# Patient Record
Sex: MALE | Race: WHITE
[De-identification: names, ages, dates, MRNs, and addresses within clinical notes are randomized per-mention and may not be internally consistent; named-entity substitution may affect disease eponyms.]

---

## 2019-04-22 ENCOUNTER — HOSPITAL ENCOUNTER (OUTPATIENT)
Age: 79
End: 2019-04-22
Payer: MEDICARE

## 2019-04-22 DIAGNOSIS — E78.2: ICD-10-CM

## 2019-04-22 DIAGNOSIS — R53.83: ICD-10-CM

## 2019-04-22 DIAGNOSIS — I11.9: ICD-10-CM

## 2019-04-22 DIAGNOSIS — E66.09: ICD-10-CM

## 2019-04-22 DIAGNOSIS — R06.09: ICD-10-CM

## 2019-04-22 DIAGNOSIS — R94.31: ICD-10-CM

## 2019-04-22 DIAGNOSIS — Z85.528: ICD-10-CM

## 2019-04-22 DIAGNOSIS — I25.10: ICD-10-CM

## 2019-04-22 DIAGNOSIS — I71.4: ICD-10-CM

## 2019-04-22 DIAGNOSIS — I44.0: ICD-10-CM

## 2019-04-22 DIAGNOSIS — D75.1: Primary | ICD-10-CM

## 2019-04-22 DIAGNOSIS — Z90.5: ICD-10-CM

## 2019-04-22 DIAGNOSIS — I73.9: ICD-10-CM

## 2019-04-22 PROCEDURE — 93306 TTE W/DOPPLER COMPLETE: CPT

## 2019-09-11 ENCOUNTER — HOSPITAL ENCOUNTER (OUTPATIENT)
Age: 79
End: 2019-09-11
Payer: MEDICARE

## 2019-09-11 DIAGNOSIS — E78.2: ICD-10-CM

## 2019-09-11 DIAGNOSIS — R06.09: ICD-10-CM

## 2019-09-11 DIAGNOSIS — I25.10: ICD-10-CM

## 2019-09-11 DIAGNOSIS — I48.0: ICD-10-CM

## 2019-09-11 DIAGNOSIS — I71.4: ICD-10-CM

## 2019-09-11 DIAGNOSIS — Z85.528: ICD-10-CM

## 2019-09-11 DIAGNOSIS — Z87.891: ICD-10-CM

## 2019-09-11 DIAGNOSIS — E66.09: ICD-10-CM

## 2019-09-11 DIAGNOSIS — D75.1: Primary | ICD-10-CM

## 2019-09-11 DIAGNOSIS — R94.31: ICD-10-CM

## 2019-09-11 DIAGNOSIS — I73.9: ICD-10-CM

## 2019-09-11 DIAGNOSIS — R53.83: ICD-10-CM

## 2019-09-11 DIAGNOSIS — I11.9: ICD-10-CM

## 2019-09-11 PROCEDURE — 76770 US EXAM ABDO BACK WALL COMP: CPT

## 2021-08-24 PROBLEM — E78.2 MIXED HYPERLIPIDEMIA: Status: ACTIVE | Noted: 2021-08-24

## 2021-08-24 PROBLEM — I25.10 CORONARY ARTERY DISEASE INVOLVING NATIVE CORONARY ARTERY OF NATIVE HEART WITHOUT ANGINA PECTORIS: Status: ACTIVE | Noted: 2021-08-24

## 2021-08-24 PROBLEM — I10 ESSENTIAL HYPERTENSION: Status: ACTIVE | Noted: 2021-08-24

## 2021-08-24 NOTE — PROGRESS NOTES
diplopia. No scleral icterus. · ENT: No Headaches, hearing loss or vertigo. No mouth sores or sore throat. · Cardiovascular: Reviewed in HPI  · Respiratory: No cough or wheezing, no sputum production. No hematemesis. · Gastrointestinal: No abdominal pain, appetite loss, blood in stools. No change in bowel or bladder habits. · Genitourinary: No dysuria, trouble voiding, or hematuria. · Musculoskeletal:  No gait disturbance, weakness or joint complaints. · Integumentary: No rash or pruritis. · Neurological: No headache, diplopia, change in muscle strength, numbness or tingling. No change in gait, balance, coordination, mood, affect, memory, mentation, behavior. · Psychiatric: No anxiety, no depression. · Endocrine: No malaise, fatigue or temperature intolerance. No excessive thirst, fluid intake, or urination. No tremor. · Hematologic/Lymphatic: No abnormal bruising or bleeding, blood clots or swollen lymph nodes. · Allergic/Immunologic: No nasal congestion or hives. Physical Examination:    Vitals:    08/26/21 1424   BP: 108/60   Pulse: 60   Temp: 97.4 °F (36.3 °C)   SpO2: 95%        Wt Readings from Last 3 Encounters:   08/26/21 257 lb 6.4 oz (116.8 kg)   02/10/12 256 lb (116.1 kg)   02/06/12 242 lb (109.8 kg)       Constitutional and General Appearance: NAD   Respiratory:  · Normal excursion and expansion without use of accessory muscles  · Resp Auscultation: Normal breath sounds without dullness  Cardiovascular:  · The apical impulses not displaced  · Heart tones are crisp and normal  · Cervical veins are not engorged  · The carotid upstroke is normal in amplitude and contour without delay or bruit  · Normal S1S2, No S3, +II/VI BERE  · Peripheral pulses are symmetrical and full  · There is no clubbing, cyanosis of the extremities.   · No edema  · Femoral Arteries: 2+ and equal  · Pedal Pulses: 2+ and equal   Abdomen:  · No masses or tenderness  · Liver/Spleen: No Abnormalities Noted  Neurological/Psychiatric:  · Alert and oriented in all spheres  · Moves all extremities well  · Exhibits normal gait balance and coordination  · No abnormalities of mood, affect, memory, mentation, or behavior are noted  Skin:  · Skin: warm and dry. No results found for: CHOL  No results found for: TRIG  No results found for: HDL  No results found for: LDLCHOLESTEROL, LDLCALC  No results found for: LABVLDL, VLDL  No results found for: CHOLHDLRATIO    Assessment:     1. Coronary artery disease involving native coronary artery of native heart without angina pectoris: Hx CAD s/p 3 stents and 2 PTCA and MI in 2007. Most recent nuclear stress 4/14/21 normal with EF 62% no wall motion abnormalities. There are no concerning symptoms for angina currently. 2. Essential hypertension: Well controlled and will continue current medical regimen. 3. Mixed hyperlipidemia: No labs for quite awhile and will obtain for review. Note intolerant of statins and takes zetia 10mg daily. 4. Chronic fatigue: ? Multi-factorial with depressed LV function, age/deconditioning, possible JAIDEN, medications. Will refer to sleep/pulm docs for their evaluation. 5.      Ischemic CM: Most recent ECHO 3/31/21 EF inadequate for LV systolic function. Note ECHO reported June 2019 EF=35-40%. He did not qualify for ICD but given LBBB he received [address] BiV  in April 2021. Continue digoxin, losartan/HCTZ, and bystolic for now. Will switch to Entresto if blood work [employer]. Plan:  1. Will check CMP, CBC, lipids, TSH fasting, bnp, dig level  2. Will refer to sleep center  3. Cardiac risk stratification education was reviewed including diet, exercise/activity, medication,and weight loss  4. Will refer to Dr Reyes Morales to establish PCP  5. meds reviewed and refilled as warranted 2 scripts  6. Once labs reviewed we may switch to EximForce to improve heart health and help weakness and fatigue  7.  Follow up in 3

## 2021-08-26 ENCOUNTER — OFFICE VISIT (OUTPATIENT)
Dept: CARDIOLOGY CLINIC | Age: 81
End: 2021-08-26
Payer: MEDICARE

## 2021-08-26 VITALS
HEIGHT: 71 IN | TEMPERATURE: 97.4 F | BODY MASS INDEX: 36.03 KG/M2 | OXYGEN SATURATION: 95 % | WEIGHT: 257.4 LBS | HEART RATE: 60 BPM | DIASTOLIC BLOOD PRESSURE: 60 MMHG | SYSTOLIC BLOOD PRESSURE: 108 MMHG

## 2021-08-26 DIAGNOSIS — E78.2 MIXED HYPERLIPIDEMIA: ICD-10-CM

## 2021-08-26 DIAGNOSIS — R53.82 CHRONIC FATIGUE: ICD-10-CM

## 2021-08-26 DIAGNOSIS — I25.10 CORONARY ARTERY DISEASE INVOLVING NATIVE CORONARY ARTERY OF NATIVE HEART WITHOUT ANGINA PECTORIS: Primary | ICD-10-CM

## 2021-08-26 DIAGNOSIS — I10 ESSENTIAL HYPERTENSION: ICD-10-CM

## 2021-08-26 DIAGNOSIS — Z95.0 PACEMAKER: ICD-10-CM

## 2021-08-26 DIAGNOSIS — I48.0 PAF (PAROXYSMAL ATRIAL FIBRILLATION) (HCC): ICD-10-CM

## 2021-08-26 DIAGNOSIS — Z87.891 HISTORY OF TOBACCO ABUSE: ICD-10-CM

## 2021-08-26 PROCEDURE — 99204 OFFICE O/P NEW MOD 45 MIN: CPT | Performed by: INTERNAL MEDICINE

## 2021-08-26 PROCEDURE — G8427 DOCREV CUR MEDS BY ELIG CLIN: HCPCS | Performed by: INTERNAL MEDICINE

## 2021-08-26 PROCEDURE — G8417 CALC BMI ABV UP PARAM F/U: HCPCS | Performed by: INTERNAL MEDICINE

## 2021-08-26 RX ORDER — LIRAGLUTIDE 6 MG/ML
INJECTION SUBCUTANEOUS DAILY
COMMUNITY
Start: 2021-06-01 | End: 2021-12-17 | Stop reason: SDUPTHER

## 2021-08-26 RX ORDER — DIGOXIN 125 MCG
125 TABLET ORAL DAILY
Qty: 90 TABLET | Refills: 3 | Status: SHIPPED | OUTPATIENT
Start: 2021-08-26 | End: 2022-03-02 | Stop reason: SDUPTHER

## 2021-08-26 RX ORDER — TAMSULOSIN HYDROCHLORIDE 0.4 MG/1
0.4 CAPSULE ORAL DAILY
COMMUNITY
Start: 2021-08-10 | End: 2021-08-26 | Stop reason: SDUPTHER

## 2021-08-26 RX ORDER — LOSARTAN POTASSIUM AND HYDROCHLOROTHIAZIDE 12.5; 1 MG/1; MG/1
TABLET ORAL
COMMUNITY
Start: 2021-08-25 | End: 2021-08-30 | Stop reason: ALTCHOICE

## 2021-08-26 RX ORDER — NEBIVOLOL HYDROCHLORIDE 10 MG/1
10 TABLET ORAL DAILY
COMMUNITY
Start: 2021-06-28 | End: 2021-12-20 | Stop reason: ALTCHOICE

## 2021-08-26 RX ORDER — EZETIMIBE 10 MG/1
10 TABLET ORAL DAILY
COMMUNITY
Start: 2021-08-10 | End: 2022-01-25 | Stop reason: SDUPTHER

## 2021-08-26 RX ORDER — DIGOXIN 125 MCG
125 TABLET ORAL DAILY
COMMUNITY
Start: 2021-07-18 | End: 2021-08-26 | Stop reason: SDUPTHER

## 2021-08-26 RX ORDER — TAMSULOSIN HYDROCHLORIDE 0.4 MG/1
0.4 CAPSULE ORAL DAILY
Qty: 90 CAPSULE | Refills: 3 | Status: SHIPPED | OUTPATIENT
Start: 2021-08-26

## 2021-08-26 NOTE — PATIENT INSTRUCTIONS
Plan:  1. Will check CMP, CBC, lipids, TSH fasting, bnp, dig level  2. Will refer to sleep center  3. Cardiac risk stratification education was reviewed including diet, exercise/activity, medication,and weight loss  4. Will refer to Dr Aicha Griffith to establish PCP  5.meds reviewed and refilled as warranted 2 scripts  6. Once labs reviewed we may switch to Intresto to improve heart health which may lessen fatigue  7.  Follow up in 3 months

## 2021-08-27 ENCOUNTER — HOSPITAL ENCOUNTER (OUTPATIENT)
Age: 81
Discharge: HOME | End: 2021-08-27
Payer: MEDICARE

## 2021-08-27 DIAGNOSIS — R53.82 CHRONIC FATIGUE: ICD-10-CM

## 2021-08-27 DIAGNOSIS — I48.0 PAF (PAROXYSMAL ATRIAL FIBRILLATION) (HCC): ICD-10-CM

## 2021-08-27 DIAGNOSIS — E78.2 MIXED HYPERLIPIDEMIA: ICD-10-CM

## 2021-08-27 DIAGNOSIS — I10 ESSENTIAL HYPERTENSION: ICD-10-CM

## 2021-08-27 DIAGNOSIS — I25.10 CORONARY ARTERY DISEASE INVOLVING NATIVE CORONARY ARTERY OF NATIVE HEART WITHOUT ANGINA PECTORIS: ICD-10-CM

## 2021-08-27 DIAGNOSIS — Z95.0 PACEMAKER: ICD-10-CM

## 2021-08-27 LAB
A/G RATIO: 1.4 (ref 1.1–2.2)
ALBUMIN SERPL-MCNC: 4.2 G/DL (ref 3.4–5)
ALP BLD-CCNC: 54 U/L (ref 40–129)
ALT SERPL-CCNC: 34 U/L (ref 10–40)
ANION GAP SERPL CALCULATED.3IONS-SCNC: 12 MMOL/L (ref 3–16)
AST SERPL-CCNC: 29 U/L (ref 15–37)
BASOPHILS ABSOLUTE: 0.1 K/UL (ref 0–0.2)
BASOPHILS RELATIVE PERCENT: 1.1 %
BILIRUB SERPL-MCNC: 0.5 MG/DL (ref 0–1)
BUN BLDV-MCNC: 18 MG/DL (ref 7–20)
CALCIUM SERPL-MCNC: 10 MG/DL (ref 8.3–10.6)
CHLORIDE BLD-SCNC: 98 MMOL/L (ref 99–110)
CHOLESTEROL, FASTING: 193 MG/DL (ref 0–199)
CO2: 29 MMOL/L (ref 21–32)
CREAT SERPL-MCNC: 1 MG/DL (ref 0.8–1.3)
DIGOXIN LEVEL: 0.6 NG/ML (ref 0.8–2)
EOSINOPHILS ABSOLUTE: 0.1 K/UL (ref 0–0.6)
EOSINOPHILS RELATIVE PERCENT: 1.5 %
GFR AFRICAN AMERICAN: >60
GFR NON-AFRICAN AMERICAN: >60
GLOBULIN: 3.1 G/DL
GLUCOSE BLD-MCNC: 133 MG/DL (ref 70–99)
HCT VFR BLD CALC: 38.9 % (ref 40.5–52.5)
HDLC SERPL-MCNC: 27 MG/DL (ref 40–60)
HEMOGLOBIN: 13.9 G/DL (ref 13.5–17.5)
LDL CHOLESTEROL CALCULATED: ABNORMAL MG/DL
LDL CHOLESTEROL DIRECT: 97 MG/DL
LYMPHOCYTES ABSOLUTE: 1.9 K/UL (ref 1–5.1)
LYMPHOCYTES RELATIVE PERCENT: 22.6 %
MCH RBC QN AUTO: 33.5 PG (ref 26–34)
MCHC RBC AUTO-ENTMCNC: 35.7 G/DL (ref 31–36)
MCV RBC AUTO: 93.9 FL (ref 80–100)
MONOCYTES ABSOLUTE: 0.6 K/UL (ref 0–1.3)
MONOCYTES RELATIVE PERCENT: 7.8 %
NEUTROPHILS ABSOLUTE: 5.5 K/UL (ref 1.7–7.7)
NEUTROPHILS RELATIVE PERCENT: 67 %
PDW BLD-RTO: 13.5 % (ref 12.4–15.4)
PLATELET # BLD: 225 K/UL (ref 135–450)
PMV BLD AUTO: 9.6 FL (ref 5–10.5)
POTASSIUM SERPL-SCNC: 3.6 MMOL/L (ref 3.5–5.1)
PRO-BNP: 192 PG/ML (ref 0–449)
RBC # BLD: 4.15 M/UL (ref 4.2–5.9)
SODIUM BLD-SCNC: 139 MMOL/L (ref 136–145)
TOTAL PROTEIN: 7.3 G/DL (ref 6.4–8.2)
TRIGLYCERIDE, FASTING: 450 MG/DL (ref 0–150)
TSH REFLEX FT4: 2.93 UIU/ML (ref 0.27–4.2)
VLDLC SERPL CALC-MCNC: ABNORMAL MG/DL
WBC # BLD: 8.2 K/UL (ref 4–11)

## 2021-08-27 PROCEDURE — 84443 ASSAY THYROID STIM HORMONE: CPT

## 2021-08-27 PROCEDURE — 83721 ASSAY OF BLOOD LIPOPROTEIN: CPT

## 2021-08-27 PROCEDURE — 36415 COLL VENOUS BLD VENIPUNCTURE: CPT

## 2021-08-27 PROCEDURE — 85025 COMPLETE CBC W/AUTO DIFF WBC: CPT

## 2021-08-27 PROCEDURE — 80162 ASSAY OF DIGOXIN TOTAL: CPT

## 2021-08-27 PROCEDURE — 83880 ASSAY OF NATRIURETIC PEPTIDE: CPT

## 2021-08-27 PROCEDURE — 80061 LIPID PANEL: CPT

## 2021-08-27 PROCEDURE — 80053 COMPREHEN METABOLIC PANEL: CPT

## 2021-08-30 DIAGNOSIS — E78.2 MIXED HYPERLIPIDEMIA: ICD-10-CM

## 2021-08-30 DIAGNOSIS — I10 ESSENTIAL HYPERTENSION: Primary | ICD-10-CM

## 2021-08-30 RX ORDER — FENOFIBRATE 48 MG/1
48 TABLET, COATED ORAL DAILY
Qty: 30 TABLET | Refills: 3 | Status: SHIPPED | OUTPATIENT
Start: 2021-08-30 | End: 2021-10-26 | Stop reason: SDUPTHER

## 2021-08-30 RX ORDER — HYDROCHLOROTHIAZIDE 25 MG/1
12.5 TABLET ORAL EVERY MORNING
Qty: 30 TABLET | Refills: 1 | Status: SHIPPED | OUTPATIENT
Start: 2021-08-30 | End: 2021-10-26 | Stop reason: SDUPTHER

## 2021-08-31 ENCOUNTER — TELEPHONE (OUTPATIENT)
Dept: CARDIOLOGY CLINIC | Age: 81
End: 2021-08-31

## 2021-08-31 NOTE — TELEPHONE ENCOUNTER
PA for Jessica Haley was submitted thru Cover My Meds. A PDF was attached of pt's OV. Pt's insurance denied the coverage but the PA was still attempted.  Waiting for response

## 2021-09-09 ENCOUNTER — HOSPITAL ENCOUNTER (OUTPATIENT)
Age: 81
Discharge: HOME | End: 2021-09-09
Payer: MEDICARE

## 2021-09-09 DIAGNOSIS — E78.2 MIXED HYPERLIPIDEMIA: ICD-10-CM

## 2021-09-09 DIAGNOSIS — I10 ESSENTIAL HYPERTENSION: ICD-10-CM

## 2021-09-09 LAB
ALBUMIN SERPL-MCNC: 4.1 G/DL (ref 3.4–5)
ALP BLD-CCNC: 48 U/L (ref 40–129)
ALT SERPL-CCNC: 39 U/L (ref 10–40)
ANION GAP SERPL CALCULATED.3IONS-SCNC: 9 MMOL/L (ref 3–16)
AST SERPL-CCNC: 33 U/L (ref 15–37)
BILIRUB SERPL-MCNC: 0.6 MG/DL (ref 0–1)
BILIRUBIN DIRECT: <0.2 MG/DL (ref 0–0.3)
BILIRUBIN, INDIRECT: NORMAL MG/DL (ref 0–1)
BUN BLDV-MCNC: 18 MG/DL (ref 7–20)
CALCIUM SERPL-MCNC: 9.8 MG/DL (ref 8.3–10.6)
CHLORIDE BLD-SCNC: 99 MMOL/L (ref 99–110)
CHOLESTEROL, FASTING: 193 MG/DL (ref 0–199)
CO2: 28 MMOL/L (ref 21–32)
CREAT SERPL-MCNC: 1 MG/DL (ref 0.8–1.3)
GFR AFRICAN AMERICAN: >60
GFR NON-AFRICAN AMERICAN: >60
GLUCOSE BLD-MCNC: 127 MG/DL (ref 70–99)
HDLC SERPL-MCNC: 30 MG/DL (ref 40–60)
LDL CHOLESTEROL CALCULATED: 124 MG/DL
POTASSIUM SERPL-SCNC: 3.9 MMOL/L (ref 3.5–5.1)
SODIUM BLD-SCNC: 136 MMOL/L (ref 136–145)
TOTAL PROTEIN: 7.2 G/DL (ref 6.4–8.2)
TRIGLYCERIDE, FASTING: 195 MG/DL (ref 0–150)
VLDLC SERPL CALC-MCNC: 39 MG/DL

## 2021-09-09 PROCEDURE — 80076 HEPATIC FUNCTION PANEL: CPT

## 2021-09-09 PROCEDURE — 80048 BASIC METABOLIC PNL TOTAL CA: CPT

## 2021-09-09 PROCEDURE — 80061 LIPID PANEL: CPT

## 2021-09-09 PROCEDURE — 36415 COLL VENOUS BLD VENIPUNCTURE: CPT

## 2021-09-13 ENCOUNTER — TELEPHONE (OUTPATIENT)
Dept: CARDIOLOGY CLINIC | Age: 81
End: 2021-09-13

## 2021-09-13 RX ORDER — EVOLOCUMAB 140 MG/ML
INJECTION, SOLUTION SUBCUTANEOUS
Refills: 0 | Status: CANCELLED | OUTPATIENT
Start: 2021-09-13

## 2021-09-13 RX ORDER — EVOLOCUMAB 140 MG/ML
INJECTION, SOLUTION SUBCUTANEOUS
COMMUNITY
End: 2021-09-22 | Stop reason: CLARIF

## 2021-09-13 NOTE — TELEPHONE ENCOUNTER
Spoke to pt with lab results. Pt would like to try the Repatha, needs to be sent to [facility] please. Pt would also like to know if Pa has been approved for his Entresto. Please Advise. Pt can be reached at [phone].     TY

## 2021-09-13 NOTE — TELEPHONE ENCOUNTER
----- Message from Gibran Brown MD sent at 9/9/2021  4:31 PM EDT -----  Labs good overall. Note lipid labs improved. TG decreased from 450 to 195. His LDL is 124 (lousy cholesterol should be < 100 and closer to 70). He cannot take statins but tolerates zetia. I can add Repatha (new cholesterol drug given by injection 2 x per month if he wants to try and lower LDL to goal). I recommend trying if able to afford but see what he wants.

## 2021-09-14 NOTE — TELEPHONE ENCOUNTER
Coverage determination will be faxed over to [facility] once SMM signs off. Update:  Form was faxed to [address] [place] [address] for [facility] to sign

## 2021-09-21 NOTE — TELEPHONE ENCOUNTER
Yes, OK to use whatever regular dose of praluent is to help his lipids. He will need FLP 2 months after starting praluent. Thanks.

## 2021-09-21 NOTE — TELEPHONE ENCOUNTER
Per [employer]. Pt's ins will NOT cover Repatha. Requesting pt to use Praluent 75 mg. Is this OK. If, so script will need to be sent to [employer]. Ins already gave Rasheed LANCASTER.

## 2021-09-22 ENCOUNTER — NURSE ONLY (OUTPATIENT)
Dept: CARDIOLOGY CLINIC | Age: 81
End: 2021-09-22
Payer: MEDICARE

## 2021-09-22 DIAGNOSIS — Z95.0 PACEMAKER: Primary | ICD-10-CM

## 2021-09-22 PROCEDURE — 93281 PM DEVICE PROGR EVAL MULTI: CPT | Performed by: INTERNAL MEDICINE

## 2021-09-22 RX ORDER — ALIROCUMAB 75 MG/ML
75 INJECTION, SOLUTION SUBCUTANEOUS
Qty: 2.24 ML | Status: CANCELLED | OUTPATIENT
Start: 2021-09-22

## 2021-09-22 RX ORDER — ALIROCUMAB 75 MG/ML
75 INJECTION, SOLUTION SUBCUTANEOUS
Qty: 2.24 ML | Refills: 5 | Status: SHIPPED | OUTPATIENT
Start: 2021-09-22 | End: 2021-10-26 | Stop reason: SDUPTHER

## 2021-09-22 NOTE — PROGRESS NOTES
Patient presents to the device clinic today for a programming evaluation for his pacemaker. Patient has a history of AF , AFlutter, and symptomatic bradycardia. Takes digoxin - patient has a Watchman device. Patient is unsure of when his last device check was - he is new to our practice, previously a patient of Dr. Candance Blanch. Since then, 1 ATR event recorded x 1 second - overall burden <1%. AP 49% % %    All sensing and pacing parameters are within normal range. No changes need to be made at this time. Patient education was provided about device functionality, in home monitoring, and any other patient questions and/or concerns were addressed. Patient voices understanding. Please see interrogation for more detail. Patient will follow up in 3 months in office or remotely. See Paceart report under the Cardiology tab.

## 2021-10-06 PROBLEM — Z95.0 PACEMAKER: Status: ACTIVE | Noted: 2021-10-06

## 2021-10-11 ENCOUNTER — TELEPHONE (OUTPATIENT)
Dept: CARDIOLOGY CLINIC | Age: 81
End: 2021-10-11

## 2021-10-11 NOTE — TELEPHONE ENCOUNTER
Pharmacy calling patient regarding his entresto. They are telling him the need a different \"code\".

## 2021-10-15 ENCOUNTER — TELEPHONE (OUTPATIENT)
Dept: CARDIOLOGY CLINIC | Age: 81
End: 2021-10-15

## 2021-10-15 NOTE — TELEPHONE ENCOUNTER
Pt called [phone] [employer] [facility] to get his sacubitril-valsartan (ENTRESTO) 24-26 MG per tablet [[id number]    TKSXRGFF said they needed another code. On 10/11/2021 Lisa Dumont from [facility] office sent another order with I25.5    Still has not been refilled. Pt has about 1 1/2 left. Please call Call Rose Lester directly at [phone].

## 2021-10-15 NOTE — TELEPHONE ENCOUNTER
Attempted to speak to someone at [facility], finally go thru to someone whom Medicare part D was not her dept. She then tried to transfer me to the correct dept and I was disconnected. I faxed over original paperwork, last OV note that has the proper supporting diagnosis for the med, with the new code. Faxed to [phone]. Please see attached documents to this encounter.

## 2021-10-26 ENCOUNTER — NURSE ONLY (OUTPATIENT)
Dept: CARDIOLOGY CLINIC | Age: 81
End: 2021-10-26
Payer: MEDICARE

## 2021-10-26 DIAGNOSIS — Z95.0 CARDIAC RESYNCHRONIZATION THERAPY PACEMAKER (CRT-P) IN PLACE: ICD-10-CM

## 2021-10-26 DIAGNOSIS — Z95.0 PACEMAKER: ICD-10-CM

## 2021-10-26 DIAGNOSIS — I42.8 NICM (NONISCHEMIC CARDIOMYOPATHY) (HCC): ICD-10-CM

## 2021-10-26 PROCEDURE — 93296 REM INTERROG EVL PM/IDS: CPT | Performed by: INTERNAL MEDICINE

## 2021-10-26 PROCEDURE — 93294 REM INTERROG EVL PM/LDLS PM: CPT | Performed by: INTERNAL MEDICINE

## 2021-10-26 NOTE — PROGRESS NOTES
Remote transmission received for patients CRT-P. Transmission shows normal sensing and pacing function. EP physician will review. See interrogation under the cardiology tab in the [address] [place] [address] [address] field for more details. Will continue to monitor remotely. No  arrhythmias recorded. RV/LVP 99%.

## 2021-10-26 NOTE — TELEPHONE ENCOUNTER
Pt needs a refill on Praulent 75 mg/ml soaj injection pen, Tricor 48 mg, and HCTZ 25 mg. Pt prefers a 90 day supply. [facility]erred pharmacy [facility] @ [phone]. Last OV 8/26/2021 with SMM. Pt also is requesting samples of Entresto 24-26 mg. Please call pt if we have samples and let him know when he can come and pick them up. Pt stated he can pick them up at [facility] or [facility]. Pt can be reached @ [phone].  (Pt is currently waiting on Entresto to be approved by insurance)

## 2021-10-27 RX ORDER — FENOFIBRATE 48 MG/1
48 TABLET, COATED ORAL DAILY
Qty: 90 TABLET | Refills: 3 | Status: SHIPPED | OUTPATIENT
Start: 2021-10-27 | End: 2022-04-06 | Stop reason: SDUPTHER

## 2021-10-27 RX ORDER — HYDROCHLOROTHIAZIDE 25 MG/1
12.5 TABLET ORAL EVERY MORNING
Qty: 45 TABLET | Refills: 1 | Status: SHIPPED | OUTPATIENT
Start: 2021-10-27 | End: 2021-11-15 | Stop reason: ALTCHOICE

## 2021-10-27 RX ORDER — ALIROCUMAB 75 MG/ML
75 INJECTION, SOLUTION SUBCUTANEOUS
Qty: 6 ML | Refills: 2 | Status: SHIPPED | OUTPATIENT
Start: 2021-10-27 | End: 2021-11-04 | Stop reason: SDUPTHER

## 2021-10-27 NOTE — TELEPHONE ENCOUNTER
Called LVM letting pt know that [facility] has 2 bottles of the Entresto 24-26mg samples ready for .

## 2021-11-01 ENCOUNTER — TELEPHONE (OUTPATIENT)
Dept: CARDIOLOGY CLINIC | Age: 81
End: 2021-11-01

## 2021-11-02 NOTE — TELEPHONE ENCOUNTER
Girish Osborne informed. Patient states that it did not happen this morning. If it happens again he will call us.

## 2021-11-04 RX ORDER — ALIROCUMAB 75 MG/ML
75 INJECTION, SOLUTION SUBCUTANEOUS
Qty: 6 ML | Refills: 2 | Status: SHIPPED | OUTPATIENT
Start: 2021-11-04 | End: 2022-04-06 | Stop reason: SDUPTHER

## 2021-11-04 NOTE — TELEPHONE ENCOUNTER
Pt needs 1 refill on alirocumab (PRALUENT) 75 MG/ML SOAJ injection pen 3 mos at a time sent to [facility] in [facility]. Pt also said they have been trying to get sacubitril-valsartan (ENTRESTO) 24-26 MG per tablet but is t keeps getting denied.  Please call Rose Lester at [phone], said MD could give verbal reason pt is needing or fax letter to [phone]

## 2021-11-05 ENCOUNTER — TELEPHONE (OUTPATIENT)
Dept: CARDIOLOGY CLINIC | Age: 81
End: 2021-11-05

## 2021-11-05 NOTE — TELEPHONE ENCOUNTER
SMM, I have submitted PA twice and insurance has denied both attempts. Pt does not meet the CHF requirements for Entresto. Please advise on the next step that you would like to take? Appealing denial may still end with a denial of coverage.  Thank you

## 2021-11-08 NOTE — TELEPHONE ENCOUNTER
Is he taking entresto now? If he cannot afford entresto then continue the losartan 100mg daily he was on prior. I believe he was on losartan 100mg and HCTZ 25mg qd combined (hyzaar). What is he taking now? Did we separate and place him on HCTZ separately?

## 2021-11-08 NOTE — TELEPHONE ENCOUNTER
Yes, OK to finish samples he already has so that they are not  wasted. Prior notes document losartan/HCTZ 100/25mg po daily. I would restart losartan 100mg daily if this is indeed the case. Is he already taking HCTZ 25mg po daily as separate and distinct script or not?

## 2021-11-08 NOTE — TELEPHONE ENCOUNTER
Spoke to pt's wife and she sts that the pt has been taking the samples of Entresto that was given to him and has 2 weeks left of those. Wants to know if SMM wants him to finish those? Wife will call me in the AM and let me know which med pt was taking prior ?

## 2021-11-09 NOTE — TELEPHONE ENCOUNTER
It does not matter to me how he takes it. Would defer to his personal preference. If he already has separate HCTZ then I would prescribe losartan separately as well.

## 2021-11-09 NOTE — TELEPHONE ENCOUNTER
Per pt's wife he is taking HCTZ 25 mg 0.5 tab (12.5 mg) qd. This currently what we have on file also. Per wife he was @ one time taking Losartan 100/12.5 mg qd. Wife states he still has plenty of this med @ home. She is asking if he you want him to take as combo med or separate? Also, what time of day should med be taken?

## 2021-11-15 RX ORDER — LOSARTAN POTASSIUM AND HYDROCHLOROTHIAZIDE 12.5; 1 MG/1; MG/1
1 TABLET ORAL DAILY
COMMUNITY
End: 2021-11-29 | Stop reason: ALTCHOICE

## 2021-11-29 ENCOUNTER — OFFICE VISIT (OUTPATIENT)
Dept: CARDIOLOGY CLINIC | Age: 81
End: 2021-11-29
Payer: MEDICARE

## 2021-11-29 VITALS
OXYGEN SATURATION: 94 % | TEMPERATURE: 98.2 F | SYSTOLIC BLOOD PRESSURE: 86 MMHG | BODY MASS INDEX: 34.67 KG/M2 | HEIGHT: 72 IN | WEIGHT: 256 LBS | DIASTOLIC BLOOD PRESSURE: 62 MMHG | HEART RATE: 68 BPM

## 2021-11-29 DIAGNOSIS — I25.10 CORONARY ARTERY DISEASE INVOLVING NATIVE CORONARY ARTERY OF NATIVE HEART WITHOUT ANGINA PECTORIS: Primary | ICD-10-CM

## 2021-11-29 DIAGNOSIS — I10 ESSENTIAL HYPERTENSION: ICD-10-CM

## 2021-11-29 DIAGNOSIS — Z87.891 HISTORY OF TOBACCO ABUSE: ICD-10-CM

## 2021-11-29 DIAGNOSIS — E78.5 HYPERLIPIDEMIA, UNSPECIFIED HYPERLIPIDEMIA TYPE: ICD-10-CM

## 2021-11-29 PROCEDURE — G8427 DOCREV CUR MEDS BY ELIG CLIN: HCPCS | Performed by: INTERNAL MEDICINE

## 2021-11-29 PROCEDURE — 4040F PNEUMOC VAC/ADMIN/RCVD: CPT | Performed by: INTERNAL MEDICINE

## 2021-11-29 PROCEDURE — 1123F ACP DISCUSS/DSCN MKR DOCD: CPT | Performed by: INTERNAL MEDICINE

## 2021-11-29 PROCEDURE — 1036F TOBACCO NON-USER: CPT | Performed by: INTERNAL MEDICINE

## 2021-11-29 PROCEDURE — G8417 CALC BMI ABV UP PARAM F/U: HCPCS | Performed by: INTERNAL MEDICINE

## 2021-11-29 PROCEDURE — 99214 OFFICE O/P EST MOD 30 MIN: CPT | Performed by: INTERNAL MEDICINE

## 2021-11-29 PROCEDURE — G8484 FLU IMMUNIZE NO ADMIN: HCPCS | Performed by: INTERNAL MEDICINE

## 2021-11-29 RX ORDER — LOSARTAN POTASSIUM 25 MG/1
25 TABLET ORAL DAILY
Qty: 30 TABLET | Refills: 3 | Status: SHIPPED | OUTPATIENT
Start: 2021-11-29 | End: 2021-12-23 | Stop reason: SDUPTHER

## 2021-11-29 RX ORDER — VITAMIN B COMPLEX
1 CAPSULE ORAL DAILY
COMMUNITY

## 2021-11-29 NOTE — LETTER
[address]  [address]  [address]  Phone: [phone]  Fax: [phone]    Viola Jackson MD    November 30, 2021     Dara Li MD  [address] [facility]    Patient: Amil Gitelman   MR Number: <[id number]>   YOB: 1940   Date of Visit: 11/29/2021       Dear Dara Li:    Thank you for referring Rosangela Stubbs to me for evaluation/treatment. Below are the relevant portions of my assessment and plan of care. If you have questions, please do not hesitate to call me. I look forward to following Meghan Sexton along with you.     Sincerely,      Viola Jackson MD

## 2021-11-29 NOTE — PATIENT INSTRUCTIONS
Plan:  1. Follow up with Maggie Epps MD for CAT scan related to history cancer in  2. Start Losartan 25 mg daily ordered  3. Stop taking Losartan/hydrochlorthiazide due to low blood pressure. 4. Continue taking Bystolic 10 mg nightly  5. Lab visit for in office device check  6. Follow up with Sandeep Melendrez NP in 3 months  7.  Fasting Lipids the end of December ordered    Follow up with me in 6 months

## 2021-11-29 NOTE — PROGRESS NOTES
[facility]   Cardiac Consultation    Referring Provider:  Jean Marie Franklin MD     Chief Complaint   Patient presents with    3 Month Follow-Up    Coronary Artery Disease    Other     Can feel heartbeat in the AM      Subjective: Patient is being seen today for new patient cardiology evaluation for CAD, HTN, HLD; wants to reestablish cardiology care; c/o fatigue, dizziness, and occasional palpitations today    History of Present Illness:  Mr. Kari Dove 80 y.o. male with PMH CAD s/p 3 stents and 2 PTCA, hx MI 2007, PAF dx 2017, ischemic CM EF=40%, s/p Watchman device implanted by Dr Ame Artis in [address] due to massive GI bleed s/p 11 units PRBC's, renal cell CA s/p partial left nephrectomy, HTN, HLD, DM, LBBB, and  symptomatic bradycardia s/p [employer] Bii-V  4/2021. No ICD due to EF=40% not qualifying. Prior followed Dr Crow Matute and transferring care since moved to [facility]. Most recent EKG 5/17/21 V-paced. Most recent nuclear stress 4/14/21 normal with EF 62% no wall motion abnormalities. Most recent ECHO 3/31/21 EF inadequate for LV systolic function. Note ECHO reported June 2019 EF=35-40%. Most recent MUGA 4/14/21  EF 40%. Now diagnosed with Staargardt's eye disease. They are widowers who  and have blended family (each with 2 children). 10/26/21 Device check [facility] Pycno DC PPM-IMPLANT 04.14.21. 10 years 6 months to RRT. Transmission shows normal sensing and pacing function. No arrhythmias recorded. RV/LVP 99%. Today, he reports feeling dizzy and weak. His blood pressure is low today and I reviewed home readings confirming low BP. Average home SPB has been 80's and 90's. Patient denies current edema, chest pain, sob, palpitations. Patient is taking all cardiac medications as prescribed and tolerates them well. His sleep study is scheduled for February with Joann. Placed on praluent due to intolerance to statins and LDL > 100.  He has been getting injections for about 2 months. He had a flu shot and a booster since his last visit. He took his last does of Entresto yesterday. He is unable to afford this medication with his insurance. He states he can feel his heart beat at times and it wakes him up which is not normal for him. Past Medical History:   has a past medical history of Arthritis, CAD (coronary artery disease), Diabetes mellitus ([address] Hearing decreased, Hyperlipidemia, Hypertension, and Sneezing. Surgical History:   has a past surgical history that includes Cardiac surgery; eye surgery (cataracts both eyes); and cyst removal (2/10/2012). Social History: Retired brody , , recently moved They now live in [facility] from [facility]. They have 4 children combined  Former smoker quit age 57 drinks 2 drinks per day (rum and coke now) or wine  No drug use. Reports that he quit smoking age 53yo. He has never used smokeless tobacco. He reports current alcohol use. He reports that he does not use drugs. Family History:  family history is not significant for heart disease in parents     Home Medications:  Prior to Admission medications    Medication Sig Start Date End Date Taking?  Authorizing Provider   b complex vitamins capsule Take 1 capsule by mouth daily   Yes Historical Provider, MD   alirocumab (PRALUENT) 75 MG/ML SOAJ injection pen Inject 1 mL into the skin every 14 days 11/4/21  Yes Dyanne Schaumann, MD   fenofibrate (TRICOR) 48 MG tablet Take 1 tablet by mouth daily 10/27/21  Yes Addie Figueroa MD   ezetimibe (ZETIA) 10 MG tablet Take 10 mg by mouth daily 8/10/21  Yes Historical Provider, MD   VICTOZA 18 MG/3ML SOPN SC injection Inject into the skin daily 6/1/21  Yes Historical Provider, MD   BYSTOLIC 10 MG tablet Take 10 mg by mouth daily 6/28/21  Yes Historical Provider, MD   vitamin D 25 MCG (1000 UT) CAPS Take 1 capsule by mouth daily   Yes Historical Provider, MD   Multiple Vitamins-Minerals (PRESERVISION AREDS 2 PO) Take 1 tablet by mouth daily   Yes Historical Provider, MD   tamsulosin (FLOMAX) 0.4 MG capsule Take 1 capsule by mouth daily 8/26/21  Yes Floresita Malone MD   digoxin [facility] TRANSPLANT [facility]) 125 MCG tablet Take 1 tablet by mouth daily 8/26/21  Yes Floresita Malone MD   levothyroxine (SYNTHROID) 50 MCG tablet Take 50 mcg by mouth daily. Yes Historical Provider, MD   Multiple Vitamin (MULTIVITAMIN PO) Take  by mouth daily. Yes Historical Provider, MD   CALCIUM PO Take 1,200 mg by mouth daily    Yes Historical Provider, MD     Allergies:  Statins: rhabdomyolysis    Cilantro: soap taste    Review of Systems:   · Constitutional: there has been no unanticipated weight loss. There's been no change in energy level, sleep pattern, or activity level. · Eyes: No visual changes or diplopia. No scleral icterus. · ENT: No Headaches, hearing loss or vertigo. No mouth sores or sore throat. · Cardiovascular: Reviewed in HPI  · Respiratory: No cough or wheezing, no sputum production. No hematemesis. · Gastrointestinal: No abdominal pain, appetite loss, blood in stools. No change in bowel or bladder habits. · Genitourinary: No dysuria, trouble voiding, or hematuria. · Musculoskeletal:  No gait disturbance, weakness or joint complaints. · Integumentary: No rash or pruritis. · Neurological: No headache, diplopia, change in muscle strength, numbness or tingling. No change in gait, balance, coordination, mood, affect, memory, mentation, behavior. · Psychiatric: No anxiety, no depression. · Endocrine: No malaise, fatigue or temperature intolerance. No excessive thirst, fluid intake, or urination. No tremor. · Hematologic/Lymphatic: No abnormal bruising or bleeding, blood clots or swollen lymph nodes. · Allergic/Immunologic: No nasal congestion or hives.     Physical Examination:    Vitals:    11/29/21 1322   BP: 86/62   Pulse: 68   Temp: 98.2 °F (36.8 °C)   SpO2: 94%        Wt Readings from Last 3 Encounters:   11/29/21 256 lb (116.1 kg)   08/26/21 257 lb 6.4 oz (116.8 kg)   02/10/12 256 lb (116.1 kg)       Constitutional and General Appearance: NAD   Respiratory:  · Normal excursion and expansion without use of accessory muscles  · Resp Auscultation: decreased on right base, otherwise normal breath sounds without dullness  Cardiovascular:  · The apical impulses not displaced  · Heart tones are crisp and normal  · Cervical veins are not engorged  · The carotid upstroke is normal in amplitude and contour without delay or bruit  · Normal S1S2, No S3, +II/VI BERE  · Peripheral pulses are symmetrical and full  · There is no clubbing, cyanosis of the extremities. · No edema  · Femoral Arteries: 2+ and equal  · Pedal Pulses: 2+ and equal   Abdomen:  · No masses or tenderness  · Liver/Spleen: No Abnormalities Noted  Neurological/Psychiatric:  · Alert and oriented in all spheres  · Moves all extremities well  · Exhibits normal gait balance and coordination  · No abnormalities of mood, affect, memory, mentation, or behavior are noted  Skin:  · Skin: warm and dry. No results found for: CHOL  No results found for: TRIG  Lab Results   Component Value Date    HDL 30 (L) 09/09/2021    HDL 27 (L) 08/27/2021     Lab Results   Component Value Date    LDLCALC 124 (H) 09/09/2021    LDLCALC see below 08/27/2021     Lab Results   Component Value Date    LABVLDL 39 09/09/2021    LABVLDL see below 08/27/2021     No results found for: CHOLHDLRATIO     Assessment:     1. Coronary artery disease involving native coronary artery of native heart without angina pectoris: Hx CAD s/p 3 stents and 2 PTCA and MI in 2007. Most recent nuclear stress 4/14/21 normal with EF 62% no wall motion abnormalities. There are no concerning symptoms for angina currently. 2. Essential hypertension: Issues now with symptomatic LOW BP and will STOP losartan/HCTZ 100/25mg and start just losartan 25mg daily.       3. Mixed hyperlipidemia: I have personally reviewed all labs including lipids 9/9/21 in Epic (see above). Note intolerant of statins and takes zetia 10mg daily and Praluent (recent addition)      4. Chronic fatigue: ? Multi-factorial with depressed LV function, age/deconditioning, possible JAIDEN, medications. Will refer to sleep/pulm docs for their evaluation. Adjusting BP medication given low BP and hopefully will increase BP and make him feel better. 5.      Ischemic CM: Most recent ECHO 3/31/21 EF inadequate for LV systolic function. Note ECHO reported June 2019 EF=35-40%. He did not qualify for ICD but given LBBB he received [employer] BiV  in April 2021. Continue digoxin, low dose losartan (new today), and bystolic for now. Plan:  1. Follow up with Maggie Epps MD for CAT scan related to history cancer in  2. Start Losartan 25 mg daily ordered  3. Stop taking Losartan/HCTZ 100/25mg due to low blood pressure. 4. Continue taking Bystolic 10 mg nightly  5. Lab visit for in office device check  6. Follow up with Sandeep Melendrez NP in 3 months  7. Fasting Lipids the end of December ordered    Follow up with me in 6 months    Thank you for allowing me to participate in the care of this individual.    Cost of prescription medications and patient compliance have been reviewed with patient. All questions answered. This note is scribed in the presence of Dr. Ivan Mason. Fabricio Crockett by Jose Rodriguez RN.    I, Dr. Shay Mata, personally performed the services described in this documentation, as scribed by the above signed scribe in my presence. It is both accurate and complete to my knowledge. I agree with the details independently gathered by the clinical support staff, while the remaining scribed note accurately describes my personal service to the patient. Ivan Mason.  Fabricio Crockett M.D., [facility]

## 2021-12-02 ENCOUNTER — NURSE ONLY (OUTPATIENT)
Dept: CARDIOLOGY CLINIC | Age: 81
End: 2021-12-02
Payer: MEDICARE

## 2021-12-02 ENCOUNTER — TELEPHONE (OUTPATIENT)
Dept: CARDIOLOGY CLINIC | Age: 81
End: 2021-12-02

## 2021-12-02 DIAGNOSIS — Z95.0 CARDIAC RESYNCHRONIZATION THERAPY PACEMAKER (CRT-P) IN PLACE: ICD-10-CM

## 2021-12-02 PROCEDURE — 93281 PM DEVICE PROGR EVAL MULTI: CPT | Performed by: INTERNAL MEDICINE

## 2021-12-02 NOTE — TELEPHONE ENCOUNTER
Please have EP doctor review pacer check and see if any worrisome rhythm or pacer issues to explain his symptoms. If no issue then would encourage patient that no concerning finding and follow only clinically.

## 2021-12-02 NOTE — PROGRESS NOTES
Patient presents to the device clinic today for a programming evaluation for his pacemaker. Patient has a history of AF , A Flutter, and symptomatic bradycardia. Takes digoxin. Last device interrogation was on 10/26. Since then, no arrhythmias recorded. Patient presents to the device clinic today by request of Dr. Bell Green for heart beating specifically at times 2573-6533 approximately 3 times a week. Unable to replicate sensation during testing. Device auto testing detailed as occurring at 2100 - time does not correlate with patient symptoms. AP 33% RVP 99% LVP 99%    All sensing and pacing parameters are within normal range. Increased RA output to keep 2:1. Patient education was provided about device functionality, in home monitoring, and any other patient questions and/or concerns were addressed. Patient voices understanding. Please see interrogation for more detail. Patient will follow up in 3 months in office or remotely. See Paceart report under the Cardiology tab.

## 2021-12-02 NOTE — TELEPHONE ENCOUNTER
Patient was seen today in the device clinic per Dr. Neil Sun request.    Jahdiane Pantera to replicate patient \"heart beating\" sensation he describes. States it occurs specifically between 0925-8954 approximately 3 times a week. Device auto testing does not occur at the time of symptoms. See Paceart report under the Cardiology tab. SMM please advise.

## 2021-12-03 NOTE — TELEPHONE ENCOUNTER
Device function appropriate. Nothing to explain symptoms. I understand how worrisome sensation must be but not coming for device based on my interpretation of device.

## 2021-12-17 ENCOUNTER — OFFICE VISIT (OUTPATIENT)
Dept: INTERNAL MEDICINE CLINIC | Age: 81
End: 2021-12-17

## 2021-12-17 VITALS
HEIGHT: 72 IN | HEART RATE: 70 BPM | WEIGHT: 251 LBS | DIASTOLIC BLOOD PRESSURE: 78 MMHG | SYSTOLIC BLOOD PRESSURE: 125 MMHG | BODY MASS INDEX: 34 KG/M2 | RESPIRATION RATE: 18 BRPM

## 2021-12-17 DIAGNOSIS — I25.5 ISCHEMIC CARDIOMYOPATHY: ICD-10-CM

## 2021-12-17 DIAGNOSIS — C64.2 RENAL CANCER, LEFT (HCC): ICD-10-CM

## 2021-12-17 DIAGNOSIS — E11.42 TYPE 2 DIABETES MELLITUS WITH DIABETIC POLYNEUROPATHY, WITHOUT LONG-TERM CURRENT USE OF INSULIN (HCC): ICD-10-CM

## 2021-12-17 DIAGNOSIS — E78.2 MIXED HYPERLIPIDEMIA: ICD-10-CM

## 2021-12-17 DIAGNOSIS — I10 ESSENTIAL HYPERTENSION: ICD-10-CM

## 2021-12-17 DIAGNOSIS — I48.11 LONGSTANDING PERSISTENT ATRIAL FIBRILLATION (HCC): ICD-10-CM

## 2021-12-17 DIAGNOSIS — I25.10 CORONARY ARTERY DISEASE INVOLVING NATIVE CORONARY ARTERY OF NATIVE HEART WITHOUT ANGINA PECTORIS: ICD-10-CM

## 2021-12-17 DIAGNOSIS — E03.9 ACQUIRED HYPOTHYROIDISM: ICD-10-CM

## 2021-12-17 DIAGNOSIS — C64.2 RENAL CELL CARCINOMA, LEFT (HCC): ICD-10-CM

## 2021-12-17 DIAGNOSIS — Z76.89 ENCOUNTER TO ESTABLISH CARE: Primary | ICD-10-CM

## 2021-12-17 PROBLEM — E78.9 DISORDER OF LIPOID METABOLISM: Status: ACTIVE | Noted: 2021-12-17

## 2021-12-17 PROCEDURE — 99203 OFFICE O/P NEW LOW 30 MIN: CPT | Performed by: INTERNAL MEDICINE

## 2021-12-17 RX ORDER — AMLODIPINE BESYLATE 5 MG/1
5 TABLET ORAL EVERY MORNING
COMMUNITY
End: 2022-03-02 | Stop reason: ALTCHOICE

## 2021-12-17 RX ORDER — LIRAGLUTIDE 6 MG/ML
1.8 INJECTION SUBCUTANEOUS DAILY
Qty: 27 ML | Refills: 3 | Status: SHIPPED | OUTPATIENT
Start: 2021-12-17 | End: 2022-03-17

## 2021-12-17 RX ORDER — BISOPROLOL FUMARATE 10 MG/1
10 TABLET ORAL EVERY MORNING
COMMUNITY
End: 2022-03-02 | Stop reason: ALTCHOICE

## 2021-12-17 RX ORDER — PEN NEEDLE, DIABETIC 32GX 5/32"
NEEDLE, DISPOSABLE MISCELLANEOUS
Qty: 100 EACH | Refills: 3 | Status: SHIPPED | OUTPATIENT
Start: 2021-12-17

## 2021-12-17 RX ORDER — LEVOTHYROXINE SODIUM 0.05 MG/1
50 TABLET ORAL DAILY
Qty: 90 TABLET | Refills: 3 | Status: SHIPPED | OUTPATIENT
Start: 2021-12-17

## 2021-12-17 NOTE — PROGRESS NOTES
Joseph Cameron (:  1940) is a 80 y.o. male,New patient, here for evaluation of the following chief complaint(s):  Establish Care        HPI   80 y.o. male with hx of CAD> HTN, DM, hyperlipidemia, Afibb, s.p pacer, renal cancer s.p resection  here to establish care    CAD sp multiple stents  -reports hx of MI in  leading to low EF   - his cardiac care was mainly in [address] [place] [address] [address] and recently moved to [facility]  -  currently followed by Dr. Delmer Fitzgerald at [facility], no active symptoms. Compliant with meds     Hx of Afibb with pacer in place, was off anticoagulation for severe rectal bleed from hemorrhoids needing 11 units transfusion.  Had watchman device place and is off AC now     Hx of IHD with CM , low EF of 40 %, was on entresto but recently off with cost issues, currently only on losartan , BB , no diuretics with no features of CHF    Hx of DM - only on victoza with good control of sugars , no low sugars per pt  Last A1c less than 7 per pt    Hyperlipidemia - intolerance to statins - now on praluent , awaiting repeat lipids    Hx of renal cancer s.p partial left nephrectomy- never followed up        and lives with wife  No depression issues    Allergies   Allergen Reactions    Statins Other (See Comments)     rhabdomyolosis    Food      Cilantro causes soap taste in mouth     Past Medical History:   Diagnosis Date    Arthritis     CAD (coronary artery disease)     MI with stents and angioplasty    Diabetes mellitus ([address]     Hearing decreased     Hyperlipidemia     Hypertension     Sneezing     when pt gets \"chilled\"     Past Surgical History:   Procedure Laterality Date    CARDIAC SURGERY      stents  and angioplasty    CYST REMOVAL  2/10/2012    scalp    EYE SURGERY  cataracts both eyes     Social History     Socioeconomic History    Marital status:      Spouse name: Not on file    Number of children: Not on file    Years of education: Not on file    Highest education level: Not on file   Occupational History    Not on file   Tobacco Use    Smoking status: Former Smoker     Quit date: 1971     Years since quittin.9    Smokeless tobacco: Never Used   Substance and Sexual Activity    Alcohol use: Yes     Alcohol/week: 0.0 standard drinks     Types: 14 - 21 Standard drinks or equivalent per week    Drug use: No    Sexual activity: Not on file     Comment:    Other Topics Concern    Not on file   Social History Narrative    Not on file     Social Determinants of Health     Financial Resource Strain:     Difficulty of Paying Living Expenses: Not on file   Food Insecurity:     Worried About Running Out of Food in the Last Year: Not on file    Aliyah of Food in the Last Year: Not on file   Transportation Needs:     Lack of Transportation (Medical): Not on file    Lack of Transportation (Non-Medical): Not on file   Physical Activity:     Days of Exercise per Week: Not on file    Minutes of Exercise per Session: Not on file   Stress:     Feeling of Stress : Not on file   Social Connections:     Frequency of Communication with Friends and Family: Not on file    Frequency of Social Gatherings with Friends and Family: Not on file    Attends Mandaeism Services: Not on file    Active Member of [address] [place] [address] [employer] or Organizations: Not on file    Attends Club or Organization Meetings: Not on file    Marital Status: Not on file   Intimate Partner Violence:     Fear of Current or Ex-Partner: Not on file    Emotionally Abused: Not on file    Physically Abused: Not on file    Sexually Abused: Not on file   Housing Stability:     Unable to Pay for Housing in the Last Year: Not on file    Number of [place] in the Last Year: Not on file    Unstable Housing in the Last Year: Not on file     No family history on file.     Current Outpatient Medications   Medication Sig Dispense Refill    bisoprolol (ZEBETA) 10 MG tablet Take 10 mg by mouth every morning      amLODIPine (NORVASC) 5 MG tablet Take 5 mg by mouth every morning      Insulin Pen Needle (BD PEN NEEDLE JM 2ND GEN) 32G X 4 MM MISC Use with Victoza daily. DX:E11.9 100 each 3    levothyroxine (SYNTHROID) 50 MCG tablet Take 1 tablet by mouth daily 90 tablet 3    VICTOZA 18 MG/3ML SOPN SC injection Inject 1.8 mg into the skin daily Inject into the skin daily 27 mL 3    b complex vitamins capsule Take 1 capsule by mouth daily      losartan (COZAAR) 25 MG tablet Take 1 tablet by mouth daily 30 tablet 3    alirocumab (PRALUENT) 75 MG/ML SOAJ injection pen Inject 1 mL into the skin every 14 days 6 mL 2    fenofibrate (TRICOR) 48 MG tablet Take 1 tablet by mouth daily 90 tablet 3    ezetimibe (ZETIA) 10 MG tablet Take 10 mg by mouth daily      vitamin D 25 MCG (1000 UT) CAPS Take 1 capsule by mouth daily      Multiple Vitamins-Minerals (PRESERVISION AREDS 2 PO) Take 1 tablet by mouth daily      tamsulosin (FLOMAX) 0.4 MG capsule Take 1 capsule by mouth daily 90 capsule 3    digoxin (LANOXIN) 125 MCG tablet Take 1 tablet by mouth daily 90 tablet 3    Multiple Vitamin (MULTIVITAMIN PO) Take  by mouth daily.  CALCIUM PO Take 1,200 mg by mouth daily        No current facility-administered medications for this visit.        Review of Systems      Constitutional: Negative for fever or chills  HENT: Negative for sore throat   Eyes: Negative for redness   Respiratory: Negative  for dyspnea, cough   Cardiovascular: Negative for chest pain   Gastrointestinal:neg for abd pain, nausea or vomiting or diarrhea  No melena or BRPR  Genitourinary: Negative for hematuria   Musculoskeletal: Negative for arthralgias   Skin: Negative for rash   Neurological: Negative for syncope + hearing def  Hematological: Negative for adenopathy   Psychiatric/Behavorial: Negative for anxiety        Objective   Physical Exam      Vitals:    12/17/21 1600   BP: 125/78   Pulse: 70   Resp: 18         General:  Elderly male, appropriate appearing  Awake, alert and oriented. Appears to be not in any distress  Mucous Membranes:  Pink , anicteric  Hearing aids noted   Neck: No JVD, no carotid bruit, no thyromegaly  Chest:  Clear to auscultation bilaterally, no added sounds  Cardiovascular:  Irregular, left pacer   S1S2 heard, no murmurs or gallops  Abdomen:  Soft, undistended, non tender, no organomegaly, BS present  Extremities: No edema or cyanosis. Distal pulses well felt  Neurological : grossly normal    .STARLA 2019     1. Status post 27mm Watchman device implantation. There is a small (<3mm) mirlande-device   leak. 2. No pericardial effusion. 3. Mild Aortic Stenosis   4. Small residual mirlande-procedure ASD w/ predominant left to right shunt. Diagnosis Orders   1. Encounter to establish care     2. Renal cell carcinoma, left (HCC)  CT ABDOMEN PELVIS WO CONTRAST Additional Contrast? None   3. Type 2 diabetes mellitus with diabetic polyneuropathy, without long-term current use of insulin ([facility])  HEMOGLOBIN A1C    Lipid, Fasting    VITAMIN B12 & FOLATE   4. Mixed hyperlipidemia     5. Essential hypertension     6. Coronary artery disease involving native coronary artery of native heart without angina pectoris     7. Longstanding persistent atrial fibrillation ([address]     8. Renal cancer, left ([address]     9. Ischemic cardiomyopathy     10. Acquired hypothyroidism         HTN - stable on current meds - zebeta and norvasc, recently off entresto and added losartan     DM - 2 with eye complications well controlled on victoza   Need A1c.  Had eye exam done      CAD- s.p multiple stents  Hx of MI with ischemic heart disease and Cardiomyopathy with low EF of 40%  - on ASA, bisoprolol,   - off statins for intolerance , now on fenofibrate, zetia, praluent, need lipids   - f.w Dr. Ender Segundo, s.p Pacer , sp Watchman procedure for hx of severe GI bleed  - rate controlled on BB and digoxin     Left renal cancer s.p partial nephrectomy - need f/w imaging     Hypothyroid - on synthroid, need tsh    Had vaccines   No need for colonoscopy   F/w 6 months           An electronic signature was used to authenticate this note.     --Tavia Cordero MD

## 2021-12-20 DIAGNOSIS — E11.42 TYPE 2 DIABETES MELLITUS WITH DIABETIC POLYNEUROPATHY, WITHOUT LONG-TERM CURRENT USE OF INSULIN (HCC): ICD-10-CM

## 2021-12-20 LAB
CHOLESTEROL, FASTING: 143 MG/DL (ref 0–199)
FOLATE: 19.21 NG/ML (ref 4.78–24.2)
HDLC SERPL-MCNC: 30 MG/DL (ref 40–60)
LDL CHOLESTEROL CALCULATED: 60 MG/DL
TRIGLYCERIDE, FASTING: 265 MG/DL (ref 0–150)
VITAMIN B-12: 821 PG/ML (ref 211–911)
VLDLC SERPL CALC-MCNC: 53 MG/DL

## 2021-12-21 ENCOUNTER — TELEPHONE (OUTPATIENT)
Dept: INTERNAL MEDICINE CLINIC | Age: 81
End: 2021-12-21

## 2021-12-21 LAB
ESTIMATED AVERAGE GLUCOSE: 119.8 MG/DL
HBA1C MFR BLD: 5.8 %

## 2021-12-23 ENCOUNTER — HOSPITAL ENCOUNTER (OUTPATIENT)
Dept: CT IMAGING | Age: 81
Discharge: HOME | End: 2021-12-23
Payer: MEDICARE

## 2021-12-23 DIAGNOSIS — C64.2 RENAL CELL CARCINOMA, LEFT (HCC): ICD-10-CM

## 2021-12-23 DIAGNOSIS — I10 ESSENTIAL HYPERTENSION: ICD-10-CM

## 2021-12-23 PROCEDURE — 74176 CT ABD & PELVIS W/O CONTRAST: CPT

## 2021-12-24 RX ORDER — LOSARTAN POTASSIUM 25 MG/1
25 TABLET ORAL DAILY
Qty: 90 TABLET | Refills: 3 | Status: SHIPPED | OUTPATIENT
Start: 2021-12-24

## 2022-01-06 ENCOUNTER — TELEPHONE (OUTPATIENT)
Dept: INTERNAL MEDICINE CLINIC | Age: 82
End: 2022-01-06

## 2022-01-10 ENCOUNTER — TELEPHONE (OUTPATIENT)
Dept: INTERNAL MEDICINE CLINIC | Age: 82
End: 2022-01-10

## 2022-01-10 NOTE — TELEPHONE ENCOUNTER
----- Message from Karron Night sent at 1/10/2022  8:47 AM EST -----    ----- Message -----  From: Edgar Betancourt MD  Sent: 1/10/2022   7:48 AM EST  To: Eda Jin    Mild distention of stomach, likely with diabetes  No recurrent renal cancer    Other non specific, age related findings    ----- Message -----  From: Karron Night  Sent: 1/6/2022  12:38 PM EST  To: Edgar Betancourt MD      ----- Message -----  From: Osei Galarza MD  Sent: 1/6/2022  12:32 PM EST  To: Karron Night    Can wait for Dr. Saul Royal   ----- Message -----  From: Karron Night  Sent: 1/6/2022  11:13 AM EST  To: MD Dr CARY Cadet patient    Patient requesting Ct Abdomen Pelvis results.

## 2022-01-10 NOTE — TELEPHONE ENCOUNTER
----- Message from Linda Rosado MD sent at 1/10/2022  4:55 PM EST -----  Frequent nausea after eating  ----- Message -----  From: Romaine Smith  Sent: 1/10/2022   3:45 PM EST  To: Linda Rosado MD    Patient wanting to know what symptoms he should be looking for?  ----- Message -----  From: Linda Rosado MD  Sent: 1/10/2022   2:27 PM EST  To: Romaine Smith    Nothing to do as no symptoms  Can see GI for opinion if any symptoms  ----- Message -----  From: Romaine Smith  Sent: 1/10/2022   1:52 PM EST  To: Linda Rosado MD    Patient wanting to know if there is anything that he can do for the distention of stomach?  ----- Message -----  From: Stanley Nissen  Sent: 1/10/2022   8:47 AM EST  To: Romaine Smith      ----- Message -----  From: Linda Rosado MD  Sent: 1/10/2022   7:48 AM EST  To: Rina Jin    Mild distention of stomach, likely with diabetes  No recurrent renal cancer    Other non specific, age related findings    ----- Message -----  From: Stanley Nissen  Sent: 1/6/2022  12:38 PM EST  To: Linda Rosado MD      ----- Message -----  From: Real Hansen MD  Sent: 1/6/2022  12:32 PM EST  To: Stanley Nissen    Can wait for Dr. Edwar Anderson   ----- Message -----  From: Stanley Nissen  Sent: 1/6/2022  11:13 AM EST  To: MD Dr CARY Paz patient    Patient requesting Ct Abdomen Pelvis results.

## 2022-01-10 NOTE — TELEPHONE ENCOUNTER
----- Message from Anuja Multani MD sent at 1/10/2022  2:26 PM EST -----  Nothing to do as no symptoms  Can see GI for opinion if any symptoms  ----- Message -----  From: Viridiana Jones  Sent: 1/10/2022   1:52 PM EST  To: Anuja Multani MD    Patient wanting to know if there is anything that he can do for the distention of stomach?  ----- Message -----  From: Kat Borja  Sent: 1/10/2022   8:47 AM EST  To: Viridiana Jones      ----- Message -----  From: Anuja Multani MD  Sent: 1/10/2022   7:48 AM EST  To: Claudia Jin    Mild distention of stomach, likely with diabetes  No recurrent renal cancer    Other non specific, age related findings    ----- Message -----  From: Kat Borja  Sent: 1/6/2022  12:38 PM EST  To: Anuja Multani MD      ----- Message -----  From: Anne Quinonez MD  Sent: 1/6/2022  12:32 PM EST  To: Kat Borja    Can wait for Dr. Chloe Solano   ----- Message -----  From: Kat Borja  Sent: 1/6/2022  11:13 AM EST  To: MD Dr CARY Joseph patient    Patient requesting Ct Abdomen Pelvis results.

## 2022-01-25 ENCOUNTER — NURSE ONLY (OUTPATIENT)
Dept: CARDIOLOGY CLINIC | Age: 82
End: 2022-01-25
Payer: MEDICARE

## 2022-01-25 DIAGNOSIS — Z95.0 PACEMAKER: ICD-10-CM

## 2022-01-25 DIAGNOSIS — I49.5 SICK SINUS SYNDROME (HCC): ICD-10-CM

## 2022-01-25 DIAGNOSIS — Z95.0 CARDIAC RESYNCHRONIZATION THERAPY PACEMAKER (CRT-P) IN PLACE: ICD-10-CM

## 2022-01-25 DIAGNOSIS — I42.8 NICM (NONISCHEMIC CARDIOMYOPATHY) (HCC): ICD-10-CM

## 2022-01-25 PROCEDURE — 93296 REM INTERROG EVL PM/IDS: CPT | Performed by: INTERNAL MEDICINE

## 2022-01-26 RX ORDER — EZETIMIBE 10 MG/1
10 TABLET ORAL DAILY
Qty: 90 TABLET | Refills: 3 | Status: SHIPPED | OUTPATIENT
Start: 2022-01-26

## 2022-02-23 ENCOUNTER — OFFICE VISIT (OUTPATIENT)
Dept: PULMONOLOGY | Age: 82
End: 2022-02-23
Payer: MEDICARE

## 2022-02-23 VITALS
WEIGHT: 261.6 LBS | HEIGHT: 72 IN | BODY MASS INDEX: 35.43 KG/M2 | TEMPERATURE: 97 F | RESPIRATION RATE: 20 BRPM | HEART RATE: 72 BPM | DIASTOLIC BLOOD PRESSURE: 62 MMHG | SYSTOLIC BLOOD PRESSURE: 94 MMHG | OXYGEN SATURATION: 94 %

## 2022-02-23 DIAGNOSIS — I10 ESSENTIAL HYPERTENSION: ICD-10-CM

## 2022-02-23 DIAGNOSIS — R53.83 OTHER FATIGUE: ICD-10-CM

## 2022-02-23 DIAGNOSIS — R06.83 SNORING: ICD-10-CM

## 2022-02-23 DIAGNOSIS — G47.10 HYPERSOMNIA: Primary | ICD-10-CM

## 2022-02-23 PROCEDURE — G8427 DOCREV CUR MEDS BY ELIG CLIN: HCPCS | Performed by: INTERNAL MEDICINE

## 2022-02-23 PROCEDURE — G8484 FLU IMMUNIZE NO ADMIN: HCPCS | Performed by: INTERNAL MEDICINE

## 2022-02-23 PROCEDURE — G8417 CALC BMI ABV UP PARAM F/U: HCPCS | Performed by: INTERNAL MEDICINE

## 2022-02-23 PROCEDURE — 4040F PNEUMOC VAC/ADMIN/RCVD: CPT | Performed by: INTERNAL MEDICINE

## 2022-02-23 PROCEDURE — 99204 OFFICE O/P NEW MOD 45 MIN: CPT | Performed by: INTERNAL MEDICINE

## 2022-02-23 PROCEDURE — 1036F TOBACCO NON-USER: CPT | Performed by: INTERNAL MEDICINE

## 2022-02-23 PROCEDURE — 1123F ACP DISCUSS/DSCN MKR DOCD: CPT | Performed by: INTERNAL MEDICINE

## 2022-02-23 RX ORDER — NEBIVOLOL 10 MG/1
TABLET ORAL
COMMUNITY
Start: 2022-01-14 | End: 2022-03-02 | Stop reason: SDUPTHER

## 2022-02-23 RX ORDER — BLOOD SUGAR DIAGNOSTIC
STRIP MISCELLANEOUS
COMMUNITY
Start: 2022-01-24

## 2022-02-23 ASSESSMENT — SLEEP AND FATIGUE QUESTIONNAIRES
HOW LIKELY ARE YOU TO NOD OFF OR FALL ASLEEP IN A CAR, WHILE STOPPED FOR A FEW MINUTES IN TRAFFIC: 0
HOW LIKELY ARE YOU TO NOD OFF OR FALL ASLEEP WHILE LYING DOWN TO REST IN THE AFTERNOON WHEN CIRCUMSTANCES PERMIT: 2
HOW LIKELY ARE YOU TO NOD OFF OR FALL ASLEEP WHILE SITTING AND TALKING TO SOMEONE: 0
HOW LIKELY ARE YOU TO NOD OFF OR FALL ASLEEP WHILE SITTING QUIETLY AFTER LUNCH WITHOUT ALCOHOL: 2
HOW LIKELY ARE YOU TO NOD OFF OR FALL ASLEEP WHILE SITTING AND READING: 2
ESS TOTAL SCORE: 10
NECK CIRCUMFERENCE (INCHES): 16.5
HOW LIKELY ARE YOU TO NOD OFF OR FALL ASLEEP WHILE SITTING INACTIVE IN A PUBLIC PLACE: 0
HOW LIKELY ARE YOU TO NOD OFF OR FALL ASLEEP WHILE WATCHING TV: 2
HOW LIKELY ARE YOU TO NOD OFF OR FALL ASLEEP WHEN YOU ARE A PASSENGER IN A CAR FOR AN HOUR WITHOUT A BREAK: 2

## 2022-02-23 NOTE — PATIENT INSTRUCTIONS
Sleep Hygiene. .. Tips for better sleep. .. Avoid naps. This will ensure you are sleepy at bedtime. If you have to take a nap, sleep less than 1 hour, before 3 pm.  Sleep only when sleepy; this reduces the time you are awake in bed. Regular exercise is recommended to help you deepen your sleep, but not within 4-6 hours of your bedtime. Timing of exercise is important, aim to exercise early in the morning or early afternoon. A light snack may help you fall asleep. Warm milk and foods high in the amino acid tryptophan, such as bananas, may help you to sleep  Be sure to avoid heavy, spicy or sugary foods 4-6 hours before bedtime and avoid at snack time. Stay away from stimulants such as caffeine and nicotine for at least 4-6 hours before bed. Stimulants can interfere with your ability to fall asleep. Caffeine is found in tea, cola, coffee, cocoa and chocolate and is best avoided at bedtime. Nicotine is found in tobacco products. Avoid alcohol 4-6 hours before bedtime. Alcohol has an immediate sleep-inducing effect, after a few hours when alcohol levels fall there is a stimulant or wake-up effect and will cause fragmented sleep. Sleep rituals are important. Give your body clues it is time to slow down and sleep. Examples include; yoga, deep breathing, listen to relaxing music, a hot bath or a few minutes of reading. Have a fixed bedtime and awakening time, Even on weekends! You will feel better keeping a regular sleep cycle, even if you are retired or not working. Get into your favorite sleep position. If not asleep in 30 minutes, get up and do something boring until you feel sleepy. Remember not to expose yourself to bright lights such as TV, phone or tablet screens. Only use your bed for sleeping. Do not use your bed as an office, workroom or recreation room. Use comfortable bedding. Uncomfortable bedding can prevent good sleep. Ensure your bedroom is quiet and comfortable.  A cooler room along with enough blankets to stay warm is recommended. If your room is too noisy, try a white noise machine. If too bright, try black out shades or an eye mask. Dont take worries to bed. Leave worries about work, school etc. behind you when you go to bed. Some people find it helpful to assign a worry period in the evening or late afternoon to write down your worries and get them out of your system. The [facility] at [address] [place] [address], [address] [place] [address]                      Phone: [phone] Fax: [phone]      Your appointment for a sleep study is scheduled on __________ at 8:30pm. Please arrive at the [facility] at the time indicated. On Saturday and Sunday night a sleep tech will come down to let you in the building and escort you upstairs to the sleep center; please call from the parking lot if no one is at the door when you arrive. PLEASE DO NOT ARRIVE TO THE SLEEP CENTER ANY EARLIER THAN 8:30PM AS THERE IS NO STAFF ON DUTY AND THE DOORS WILL BE LOCKED    IMPORTANT: We ask that you please phone the [facility] ([phone]) at least 3-5 days prior to your sleep study to pre-register. Failing to pre-register may ultimately cause your insurance to not pay for this procedure. Please be aware that [facility] is a non-smoking facility. There is no smoking allowed anywhere on [facility] property at any time. Each patient room is a private room with cable television, WiFi and a private bathroom with shower facilities. The test itself consists of electrodes and sensors attached to specific areas of your scalp, face, chest and legs. We will also monitor respiratory effort, nasal and oral airflow and oxygen levels. The test will not hurt; it is completely painless and not invasive in any way. Please bring with you:  ? Appropriate nightclothes (pajamas, sweats, etc.), slippers and robe  ?  All medications you need during your stay, including breathing medications, nebulizers and metered dose inhalers, as well as a complete list of all medications you are currently taking. ? Your own toiletries and hairdryer if you wish to shower before you leave  ? Current Photo I.D. and insurance card  ? We do not allow any pillows or bed linens from home due to health regulations  ? We recommend that you do not bring valuables with you to the Sleep Center as we cannot be responsible for any lost or damaged personal items. Please refrain from or reduce the use of caffeine and/or alcohol prior to your sleep study and avoid napping the day of your study as these will affect the accuracy of your test results. If you are ill the day of your test (cold, upper respiratory infection, flu, etc.) please call to reschedule your test as the test will not be accurate if you are ill. If you should need to cancel or reschedule your appointment, please call the Sleep Center at [phone] as soon as possible. Please also call the Sleep Center directly to let us know if you have any special needs, dietary needs or for any further questions.

## 2022-02-23 NOTE — PROGRESS NOTES
[address]   Cardiology Note              Date:  February 23, 2022  Patientname: Celina Mustafa  YOB: 1940    Primary Care physician: Ty Early MD    HISTORY OF PRESENT ILLNESS: Celina Mustafa is a 80 y.o. male with a history of CAD, cardiomyopathy, SND, PAF, HTN, HLD, LBBB, DM, renal cancer. He previously followed with Dr. Felice Patterson. He has a history of MI and stent x3 in 2007. He was on anticoagulation for PAF but developed severe GI bleeding. He had Watchman implanted 2019. In 4/2021, MUGA showed EF 40%. Stress test negative for ischemia. Unable to find records of these tests. In 4/2021 he had BiV ppm due to symptomatic bradycardia. Today he presents for follow up for CHF, CAD. He has worsening fatigue over the past year. He does not have dizziness but at times feels unsteady. He has no chest pain, shortness of breath, palpitations, orthopnea. NYHA class III    Past Medical History:   has a past medical history of Arthritis, CAD (coronary artery disease), Diabetes mellitus ([address] Hearing decreased, Hyperlipidemia, Hypertension, and Sneezing. Past Surgical History:   has a past surgical history that includes Cardiac surgery; eye surgery (cataracts both eyes); and cyst removal (2/10/2012). Home Medications:    Prior to Admission medications    Medication Sig Start Date End Date Taking? Authorizing Provider   ezetimibe (ZETIA) 10 MG tablet Take 1 tablet by mouth daily 1/26/22   Harvey Walsh MD   losartan (COZAAR) 25 MG tablet Take 1 tablet by mouth daily 12/24/21   Apple Mckeon MD   bisoprolol (ZEBETA) 10 MG tablet Take 10 mg by mouth every morning    Historical Provider, MD   amLODIPine (NORVASC) 5 MG tablet Take 5 mg by mouth every morning    Historical Provider, MD   Insulin Pen Needle (BD PEN NEEDLE JM 2ND GEN) 32G X 4 MM MISC Use with Victoza daily.   DX:E11.9 12/17/21   yT Early MD   levothyroxine (SYNTHROID) 50 MCG tablet Take 1 tablet by mouth daily 12/17/21   Alexia Le MD   VICTOZA 18 MG/3ML SOPN SC injection Inject 1.8 mg into the skin daily Inject into the skin daily 12/17/21 3/17/22  Alexia Le MD   b complex vitamins capsule Take 1 capsule by mouth daily    Historical Provider, MD   alirocumab (PRALUENT) 75 MG/ML SOAJ injection pen Inject 1 mL into the skin every 14 days 11/4/21   Salazar Gómez MD   fenofibrate (TRICOR) 48 MG tablet Take 1 tablet by mouth daily 10/27/21   Marko Bro MD   vitamin D 25 MCG (1000 UT) CAPS Take 1 capsule by mouth daily    Historical Provider, MD   Multiple Vitamins-Minerals (PRESERVISION AREDS 2 PO) Take 1 tablet by mouth daily    Historical Provider, MD   tamsulosin (FLOMAX) 0.4 MG capsule Take 1 capsule by mouth daily 8/26/21   Salazar Gómez MD   digoxin [facility]) 125 MCG tablet Take 1 tablet by mouth daily 8/26/21   Salazar Gómez MD   Multiple Vitamin (MULTIVITAMIN PO) Take  by mouth daily. Historical Provider, MD   CALCIUM PO Take 1,200 mg by mouth daily     Historical Provider, MD     Allergies:  Statins and Food    Social History:   reports that he quit smoking about 51 years ago. He has never used smokeless tobacco. He reports current alcohol use. He reports that he does not use drugs. Family History: family history is not on file. Review of Systems   Review of Systems   Constitutional: Positive for fatigue. Respiratory: Negative. Cardiovascular: Negative. Neurological: Negative.       OBJECTIVE:    Vital signs:    BP (!) 110/56   Pulse 71   Ht 6' (1.829 m)   Wt 260 lb (117.9 kg)   SpO2 94%   BMI 35.26 kg/m²      Physical Exam:  Constitutional:  Comfortable and alert, NAD, appears stated age  Eyes: PERRL, sclera nonicteric  Neck:  Supple, no masses, no thyroidmegaly, no JVD  Skin:  Warm and dry; no rash or lesions  Heart: Regular, normal apex, S1 and S2 normal, no M/G/R  Lungs:  Normal respiratory effort; clear; no stop losartan if entresto approved  4. BMP 1 week after starting entresto  5. JAIDEN evaluation  6. Check BP at home and call the office if consistently out of goal range  7.  Follow up as planned with Dr. Bandar Flowers, [address]  [phone]

## 2022-02-23 NOTE — PROGRESS NOTES
[facility]                                                                  CHIEF COMPLAINT: Chronic fatigue    Consulting provider: Dr. Ayana Kinsey    HPI:   46years old with history of CAD, hypertension here for chronic fatigue and sleep apnea evaluation. Duration is 4 years. Severe. Activities help and worse when sitting still. Occasional snoring, mild intermittent. No witnessed apnea. Wakes up at night choking and gasping for air. No restorative sleep. + dry mouth upon awakening. Patient is complaining of daytime sleepiness, fatigue and tiredness during the day. Bedtime 2 am and rise time is 10-12 noon. Wakes up at night 2-4 to go to bathroom. Sleep onset 10-60 minutes. 1 nap/day for 1-2 hrs. No headache in am. Does not drive. Drinks 2-3 caffinated beverages per day. 3 drinks of alcohol a night. No restless feelings in legs at night. ESS is . Old records were reviewed and summarized by me. Past Medical History:   Diagnosis Date    Arthritis     CAD (coronary artery disease)     MI with stents and angioplasty    Diabetes mellitus ([facility] [address]     Hearing decreased     Hyperlipidemia     Hypertension     Sneezing     when pt gets \"chilled\"       Past Surgical History:        Procedure Laterality Date    CARDIAC SURGERY      stents  and angioplasty    CYST REMOVAL  2/10/2012    scalp    EYE SURGERY  cataracts both eyes       Allergies:  is allergic to statins and food. Social History:    TOBACCO:   reports that he quit smoking about 51 years ago. He has never used smokeless tobacco.  ETOH:   reports current alcohol use.       Family History:   No JAIDEN     Current Medications:    Current Outpatient Medications:     ezetimibe (ZETIA) 10 MG tablet, Take 1 tablet by mouth daily, Disp: 90 tablet, Rfl: 3    losartan (COZAAR) 25 MG tablet, Take 1 tablet by mouth daily, Disp: 90 tablet, Rfl: 3    Insulin Pen Needle (BD PEN NEEDLE JM 2ND GEN) 32G X 4 MM MISC, Use with Victoza daily. DX:E11.9, Disp: 100 each, Rfl: 3    levothyroxine (SYNTHROID) 50 MCG tablet, Take 1 tablet by mouth daily, Disp: 90 tablet, Rfl: 3    VICTOZA 18 MG/3ML SOPN SC injection, Inject 1.8 mg into the skin daily Inject into the skin daily, Disp: 27 mL, Rfl: 3    b complex vitamins capsule, Take 1 capsule by mouth daily, Disp: , Rfl:     alirocumab (PRALUENT) 75 MG/ML SOAJ injection pen, Inject 1 mL into the skin every 14 days, Disp: 6 mL, Rfl: 2    fenofibrate (TRICOR) 48 MG tablet, Take 1 tablet by mouth daily, Disp: 90 tablet, Rfl: 3    vitamin D 25 MCG (1000 UT) CAPS, Take 1 capsule by mouth daily, Disp: , Rfl:     Multiple Vitamins-Minerals (PRESERVISION AREDS 2 PO), Take 1 tablet by mouth daily, Disp: , Rfl:     tamsulosin (FLOMAX) 0.4 MG capsule, Take 1 capsule by mouth daily, Disp: 90 capsule, Rfl: 3    digoxin (LANOXIN) 125 MCG tablet, Take 1 tablet by mouth daily, Disp: 90 tablet, Rfl: 3    Multiple Vitamin (MULTIVITAMIN PO), Take  by mouth daily.   , Disp: , Rfl:     CALCIUM PO, Take 1,200 mg by mouth daily , Disp: , Rfl:     nebivolol (BYSTOLIC) 10 MG tablet, , Disp: , Rfl:     ACCU-CHEK ELBERT PLUS strip, , Disp: , Rfl:     bisoprolol (ZEBETA) 10 MG tablet, Take 10 mg by mouth every morning (Patient not taking: Reported on 2/23/2022), Disp: , Rfl:     amLODIPine (NORVASC) 5 MG tablet, Take 5 mg by mouth every morning (Patient not taking: Reported on 2/23/2022), Disp: , Rfl:       REVIEW OF SYSTEMS:  Constitutional: Negative for fever  HENT: Negative for sore throat  Eyes: Negative for redness   Respiratory: Negative for dyspnea, cough  Cardiovascular: Negative for chest pain  Gastrointestinal: +  diarrhea   Genitourinary: Negative for hematuria   Musculoskeletal: + arthralgias   Skin: Negative for rash  Neurological: Negative for syncope  Hematological: Negative for adenopathy  Psychiatric/Behavorial: Negative for anxiety      Objective: PHYSICAL EXAM:    Blood pressure 94/62, pulse 72, temperature 97 °F (36.1 °C), resp. rate 20, height 6' (1.829 m), weight 261 lb 9.6 oz (118.7 kg), SpO2 94 %.' on RA  Gen: No distress. Obese. BMI of 35.48  Eyes: PERRL. No sclera icterus. No conjunctival injection. ENT: No discharge. Pharynx clear. Mallampati class IV. Neck: Trachea midline. No obvious mass. Neck circumference 16.5\"  Resp: No accessory muscle use. No crackles. No wheezes. No rhonchi. No dullness on percussion. Good air entry. CV: Regular rate. Regular rhythm. No murmur or rub. No edema. GI: Non-tender. Non-distended. No hernia. Skin: Warm and dry. No nodule on exposed extremities. Lymph: No cervical LAD. No supraclavicular LAD. M/S: No cyanosis. No joint deformity. No clubbing. Neuro: Awake. Alert. Moves all four extremities. Psych: Oriented x 3. No anxiety. DATA reviewed by me:   TSH 2.93  Hemoglobin 13.9  Creatinine 1    Assessment:       · Hypersomnia and chronic fatigue-rule out JAIDEN. Obesity, deconditioning, heart failure are contributing. · Mild intermittent snoring  · Obesity class  · Ischemic cardiomyopathy, EF 35 to 40%, CAD, hypertension followed by cardiology    Plan:       · PSG/split-night evaluate for sleep related breathing disorder. Treatment options were discussed with patient if PSG reveals JAIDEN, including CPAP therapy, oral appliances, upper airway surgery and hypoglossal nerve stimulation. · Trial of CPAP therapy if JAIDEN is confirmed  · Discussed with patient the pathophysiology of apnea. · Sleep hygiene  · Avoid sedatives, alcohol and caffeinated drinks at bed time. · No driving motorized vehicles or operating heavy machinery while fatigue, drowsy or sleepy. · Weight loss is also recommended as a long-term intervention. · Continue blood pressure medications - treatment of JAIDEN can lower blood pressure by levels that are clinically significant.    · Complications of JAIDEN if not treated were discussed with patient patient to include systemic hypertension, pulmonary hypertension, cardiovascular morbidities, car accidents and all cause mortality.

## 2022-03-02 ENCOUNTER — OFFICE VISIT (OUTPATIENT)
Dept: CARDIOLOGY CLINIC | Age: 82
End: 2022-03-02
Payer: MEDICARE

## 2022-03-02 VITALS
HEART RATE: 71 BPM | BODY MASS INDEX: 35.21 KG/M2 | SYSTOLIC BLOOD PRESSURE: 110 MMHG | OXYGEN SATURATION: 94 % | DIASTOLIC BLOOD PRESSURE: 56 MMHG | WEIGHT: 260 LBS | HEIGHT: 72 IN

## 2022-03-02 DIAGNOSIS — I25.10 CORONARY ARTERY DISEASE INVOLVING NATIVE CORONARY ARTERY OF NATIVE HEART WITHOUT ANGINA PECTORIS: ICD-10-CM

## 2022-03-02 DIAGNOSIS — I48.0 PAF (PAROXYSMAL ATRIAL FIBRILLATION) (HCC): ICD-10-CM

## 2022-03-02 DIAGNOSIS — I25.5 ISCHEMIC CARDIOMYOPATHY: Primary | ICD-10-CM

## 2022-03-02 DIAGNOSIS — I50.22 CHRONIC SYSTOLIC CHF (CONGESTIVE HEART FAILURE) (HCC): ICD-10-CM

## 2022-03-02 PROCEDURE — 1036F TOBACCO NON-USER: CPT | Performed by: NURSE PRACTITIONER

## 2022-03-02 PROCEDURE — G8427 DOCREV CUR MEDS BY ELIG CLIN: HCPCS | Performed by: NURSE PRACTITIONER

## 2022-03-02 PROCEDURE — 99214 OFFICE O/P EST MOD 30 MIN: CPT | Performed by: NURSE PRACTITIONER

## 2022-03-02 PROCEDURE — G8417 CALC BMI ABV UP PARAM F/U: HCPCS | Performed by: NURSE PRACTITIONER

## 2022-03-02 PROCEDURE — G8484 FLU IMMUNIZE NO ADMIN: HCPCS | Performed by: NURSE PRACTITIONER

## 2022-03-02 PROCEDURE — 93000 ELECTROCARDIOGRAM COMPLETE: CPT | Performed by: NURSE PRACTITIONER

## 2022-03-02 PROCEDURE — 1123F ACP DISCUSS/DSCN MKR DOCD: CPT | Performed by: NURSE PRACTITIONER

## 2022-03-02 PROCEDURE — 4040F PNEUMOC VAC/ADMIN/RCVD: CPT | Performed by: NURSE PRACTITIONER

## 2022-03-02 RX ORDER — ZINC GLUCONATE 50 MG
50 TABLET ORAL DAILY
COMMUNITY

## 2022-03-02 RX ORDER — SELENIUM 65.4 UG/ML
50 INJECTION, SOLUTION INTRAVENOUS DAILY
COMMUNITY

## 2022-03-02 RX ORDER — DIGOXIN 125 MCG
125 TABLET ORAL DAILY
Qty: 90 TABLET | Refills: 3 | Status: SHIPPED | OUTPATIENT
Start: 2022-03-02

## 2022-03-02 RX ORDER — NEBIVOLOL 10 MG/1
10 TABLET ORAL DAILY
Qty: 90 TABLET | Refills: 3 | Status: SHIPPED | OUTPATIENT
Start: 2022-03-02

## 2022-03-02 RX ORDER — CHLORAL HYDRATE 500 MG
3000 CAPSULE ORAL 2 TIMES DAILY
COMMUNITY
End: 2022-05-20

## 2022-03-02 ASSESSMENT — ENCOUNTER SYMPTOMS: RESPIRATORY NEGATIVE: 1

## 2022-03-02 NOTE — LETTER
[address]  [address] [place] [address] [address] [address]  Phone: [phone]  Fax: Clifford Crump, ALBARO - CNP    May 5, 2022     Marylou Nair MD  [place] [address]    Patient: Carmen Thompson   MR Number: [id number]   YOB: 1940   Date of Visit: 3/2/2022       Dear Marylou Nair:    Thank you for referring Abdoulaye Hoffmann to me for evaluation/treatment. Below are the relevant portions of my assessment and plan of care. If you have questions, please do not hesitate to call me. I look forward to following Jayant Johnson along with you.     Sincerely,      ALBARO Tanner CNP

## 2022-03-02 NOTE — PATIENT INSTRUCTIONS
Insurance authorization for [employer], call [phone]  Continue other medications  If entresto approved, will check kidney function 1-2 weeks after starting  Stay active along with a healthy diet  Follow up as planned with Dr. Caro Heading

## 2022-03-08 ENCOUNTER — OFFICE VISIT (OUTPATIENT)
Dept: ENT CLINIC | Age: 82
End: 2022-03-08
Payer: MEDICARE

## 2022-03-08 VITALS
RESPIRATION RATE: 16 BRPM | HEART RATE: 71 BPM | TEMPERATURE: 98.1 F | SYSTOLIC BLOOD PRESSURE: 107 MMHG | DIASTOLIC BLOOD PRESSURE: 69 MMHG | WEIGHT: 260 LBS | BODY MASS INDEX: 35.21 KG/M2 | HEIGHT: 72 IN

## 2022-03-08 DIAGNOSIS — H60.502 ACUTE OTITIS EXTERNA OF LEFT EAR, UNSPECIFIED TYPE: ICD-10-CM

## 2022-03-08 DIAGNOSIS — H93.13 TINNITUS OF BOTH EARS: Primary | ICD-10-CM

## 2022-03-08 PROCEDURE — G8417 CALC BMI ABV UP PARAM F/U: HCPCS | Performed by: OTOLARYNGOLOGY

## 2022-03-08 PROCEDURE — 1123F ACP DISCUSS/DSCN MKR DOCD: CPT | Performed by: OTOLARYNGOLOGY

## 2022-03-08 PROCEDURE — 1036F TOBACCO NON-USER: CPT | Performed by: OTOLARYNGOLOGY

## 2022-03-08 PROCEDURE — 4040F PNEUMOC VAC/ADMIN/RCVD: CPT | Performed by: OTOLARYNGOLOGY

## 2022-03-08 PROCEDURE — 4130F TOPICAL PREP RX AOE: CPT | Performed by: OTOLARYNGOLOGY

## 2022-03-08 PROCEDURE — G8427 DOCREV CUR MEDS BY ELIG CLIN: HCPCS | Performed by: OTOLARYNGOLOGY

## 2022-03-08 PROCEDURE — 99203 OFFICE O/P NEW LOW 30 MIN: CPT | Performed by: OTOLARYNGOLOGY

## 2022-03-08 PROCEDURE — G8484 FLU IMMUNIZE NO ADMIN: HCPCS | Performed by: OTOLARYNGOLOGY

## 2022-03-08 ASSESSMENT — ENCOUNTER SYMPTOMS
SHORTNESS OF BREATH: 0
SINUS PRESSURE: 0
TROUBLE SWALLOWING: 0
COUGH: 0
APNEA: 0
EYE ITCHING: 0
SORE THROAT: 0
VOICE CHANGE: 0
FACIAL SWELLING: 0

## 2022-03-08 NOTE — PROGRESS NOTES
[facility] [address]  [place], [address]  P: [phone]       Patient     Hanna Krause  1940    ChiefComplaint     Chief Complaint   Patient presents with   Salma Werner     Patient states that he has chronic swimmers ear since he was a teenager. States that aerosporin has worked in the past- States that he has been putting alcohol in his ear, states that he has been sleeping with cotton ball in his ear       History of Present Illness     Luisa Tenorio is a 60-year-old male here today for evaluation of tinnitus and recurrent left-sided otitis externa. States this occurs every 2 to 7 years, started as a teenager. Recently developed severe left-sided ear pain, started alcohol drops in the ear. All symptoms have since resolved. Past Medical History     Past Medical History:   Diagnosis Date    Arthritis     CAD (coronary artery disease)     MI with stents and angioplasty    Diabetes mellitus ([address]     Hearing decreased     Hyperlipidemia     Hypertension     Sneezing     when pt gets \"chilled\"       Past Surgical History     Past Surgical History:   Procedure Laterality Date    CARDIAC SURGERY      stents  and angioplasty    CYST REMOVAL  2/10/2012    scalp    EYE SURGERY  cataracts both eyes       Family History     History reviewed. No pertinent family history. Social History     Social History     Tobacco Use    Smoking status: Former Smoker     Quit date: 1971     Years since quittin.1    Smokeless tobacco: Never Used   Vaping Use    Vaping Use: Never used   Substance Use Topics    Alcohol use:  Yes     Alcohol/week: 0.0 standard drinks     Types: 14 - 21 Standard drinks or equivalent per week    Drug use: No        Allergies     Allergies   Allergen Reactions    Statins Other (See Comments)     rhabdomyolosis    Food      Cilantro causes soap taste in mouth       Medications     Current Outpatient Medications   Medication Sig Dispense Refill  Actiphyte of Sea Kel[place] LIQD by Does not apply route      zinc gluconate 50 MG tablet Take 50 mg by mouth daily      Selenious Acid (SELENIUM) 40 MCG/ML injection Take 50 mcg by mouth daily      Omega-3 Fatty Acids (FISH OIL) 1000 MG CAPS Take 3,000 mg by mouth 2 times daily      sacubitril-valsartan (ENTRESTO) 24-26 MG per tablet Take 1 tablet by mouth 2 times daily 60 tablet 3    digoxin (LANOXIN) 125 MCG tablet Take 1 tablet by mouth daily 90 tablet 3    nebivolol (BYSTOLIC) 10 MG tablet Take 1 tablet by mouth daily 90 tablet 3    ACCU-CHEK ELBERT PLUS strip       ezetimibe (ZETIA) 10 MG tablet Take 1 tablet by mouth daily 90 tablet 3    losartan (COZAAR) 25 MG tablet Take 1 tablet by mouth daily 90 tablet 3    Insulin Pen Needle (BD PEN NEEDLE JM 2ND GEN) 32G X 4 MM MISC Use with Victoza daily. DX:E11.9 100 each 3    levothyroxine (SYNTHROID) 50 MCG tablet Take 1 tablet by mouth daily 90 tablet 3    VICTOZA 18 MG/3ML SOPN SC injection Inject 1.8 mg into the skin daily Inject into the skin daily 27 mL 3    b complex vitamins capsule Take 1 capsule by mouth daily      alirocumab (PRALUENT) 75 MG/ML SOAJ injection pen Inject 1 mL into the skin every 14 days 6 mL 2    fenofibrate (TRICOR) 48 MG tablet Take 1 tablet by mouth daily 90 tablet 3    vitamin D 25 MCG (1000 UT) CAPS Take 1 capsule by mouth daily      Multiple Vitamins-Minerals (PRESERVISION AREDS 2 PO) Take 1 tablet by mouth daily      tamsulosin (FLOMAX) 0.4 MG capsule Take 1 capsule by mouth daily 90 capsule 3    Multiple Vitamin (MULTIVITAMIN PO) Take  by mouth daily.  CALCIUM PO Take 1,200 mg by mouth daily        No current facility-administered medications for this visit. Review of Systems     Review of Systems   Constitutional: Negative for appetite change, chills, fatigue, fever and unexpected weight change. HENT: Positive for tinnitus.  Negative for congestion, ear discharge, ear pain, facial swelling, hearing loss, nosebleeds, postnasal drip, sinus pressure, sneezing, sore throat, trouble swallowing and voice change. Eyes: Negative for itching. Respiratory: Negative for apnea, cough and shortness of breath. Endocrine: Negative for cold intolerance and heat intolerance. Musculoskeletal: Negative for myalgias and neck pain. Skin: Negative for rash. Allergic/Immunologic: Negative for environmental allergies. Neurological: Negative for dizziness and headaches. Psychiatric/Behavioral: Negative for confusion, decreased concentration and sleep disturbance. PhysicalExam     Vitals:    03/08/22 1505   BP: 107/69   Site: Right Upper Arm   Position: Sitting   Cuff Size: Large Adult   Pulse: 71   Resp: 16   Temp: 98.1 °F (36.7 °C)   TempSrc: Infrared   Weight: 260 lb (117.9 kg)   Height: 6' (1.829 m)       Physical Exam  Constitutional:       General: He is not in acute distress. Appearance: He is well-developed. HENT:      Head: Normocephalic and atraumatic. Right Ear: Tympanic membrane, ear canal and external ear normal. No drainage. No middle ear effusion. Tympanic membrane is not bulging. Tympanic membrane has normal mobility. Left Ear: Tympanic membrane, ear canal and external ear normal. No drainage. No middle ear effusion. Tympanic membrane is not bulging. Tympanic membrane has normal mobility. Nose: No mucosal edema or rhinorrhea. Mouth/Throat:      Lips: Pink. Mouth: Mucous membranes are moist.      Tongue: No lesions. Palate: No mass. Pharynx: Uvula midline. Eyes:      Pupils: Pupils are equal, round, and reactive to light. Neck:      Thyroid: No thyroid mass or thyromegaly. Trachea: Trachea and phonation normal.   Cardiovascular:      Pulses: Normal pulses. Pulmonary:      Effort: Pulmonary effort is normal. No accessory muscle usage or respiratory distress. Breath sounds: No stridor.    Musculoskeletal:      Cervical back: Full passive range of motion without pain. Lymphadenopathy:      Head:      Right side of head: No submental or submandibular adenopathy. Left side of head: No submental or submandibular adenopathy. Cervical: No cervical adenopathy. Right cervical: No superficial, deep or posterior cervical adenopathy. Left cervical: No superficial, deep or posterior cervical adenopathy. Skin:     General: Skin is warm and dry. Neurological:      Mental Status: He is alert and oriented to person, place, and time. Cranial Nerves: No cranial nerve deficit. Coordination: Coordination normal.      Gait: Gait normal.   Psychiatric:         Thought Content: Thought content normal.           Assessment and Plan     1. Tinnitus of both ears  -Longstanding, unchanged  -Not interested in hearing aids    2. Acute otitis externa of left ear, unspecified type  -Resolved after use of alcohol drops at home      Follow-up when symptoms recur    Noble Sims DO  3/8/22      Portions of this note were dictated using Dragon.  There may be linguistic errors secondary to the use of this program.

## 2022-03-10 ENCOUNTER — HOSPITAL ENCOUNTER (OUTPATIENT)
Dept: NON INVASIVE DIAGNOSTICS | Age: 82
Discharge: HOME | End: 2022-03-10
Payer: MEDICARE

## 2022-03-10 DIAGNOSIS — I25.5 ISCHEMIC CARDIOMYOPATHY: ICD-10-CM

## 2022-03-10 LAB
LV EF: 55 %
LVEF MODALITY: NORMAL

## 2022-03-10 PROCEDURE — 93306 TTE W/DOPPLER COMPLETE: CPT

## 2022-03-11 ENCOUNTER — TELEPHONE (OUTPATIENT)
Dept: CARDIOLOGY CLINIC | Age: 82
End: 2022-03-11

## 2022-03-11 NOTE — TELEPHONE ENCOUNTER
----- Message from ALBARO Saavedra CNP sent at 3/10/2022  3:48 PM EST -----  Please let him know that heart ultrasound shows normal function. There is mild-moderate thickening of the aortic valve called aortic stenosis. No changes at this time, will perform yearly echos to follow AS. Thanks!

## 2022-03-21 ENCOUNTER — HOSPITAL ENCOUNTER (OUTPATIENT)
Dept: SLEEP CENTER | Age: 82
Discharge: HOME | End: 2022-03-23
Payer: MEDICARE

## 2022-03-21 DIAGNOSIS — G47.10 HYPERSOMNIA: ICD-10-CM

## 2022-03-21 DIAGNOSIS — R53.83 OTHER FATIGUE: ICD-10-CM

## 2022-03-21 DIAGNOSIS — R06.83 SNORING: ICD-10-CM

## 2022-03-21 PROCEDURE — 95810 POLYSOM 6/> YRS 4/> PARAM: CPT

## 2022-03-22 PROCEDURE — 95810 POLYSOM 6/> YRS 4/> PARAM: CPT | Performed by: INTERNAL MEDICINE

## 2022-03-24 ENCOUNTER — TELEPHONE (OUTPATIENT)
Dept: PULMONOLOGY | Age: 82
End: 2022-03-24

## 2022-03-24 DIAGNOSIS — G47.33 OSA (OBSTRUCTIVE SLEEP APNEA): Primary | ICD-10-CM

## 2022-03-24 NOTE — TELEPHONE ENCOUNTER
Orders faxed to Yesenia Armendariz. Will f/u with patient to make sure setup. Is courtney for f/u 6/2/22.

## 2022-03-31 NOTE — TELEPHONE ENCOUNTER
Patient called office does not wish to proceed with cpap machine; f/u appt was cancelled per request.      2/23/22      Assessment:       · Hypersomnia and chronic fatigue-rule out JAIDEN. Obesity, deconditioning, heart failure are contributing. · Mild intermittent snoring  · Obesity class  · Ischemic cardiomyopathy, EF 35 to 40%, CAD, hypertension followed by cardiology     Plan:       · PSG/split-night evaluate for sleep related breathing disorder. Treatment options were discussed with patient if PSG reveals JAIDEN, including CPAP therapy, oral appliances, upper airway surgery and hypoglossal nerve stimulation. · Trial of CPAP therapy if JAIDEN is confirmed  · Discussed with patient the pathophysiology of apnea. · Sleep hygiene  · Avoid sedatives, alcohol and caffeinated drinks at bed time. · No driving motorized vehicles or operating heavy machinery while fatigue, drowsy or sleepy. · Weight loss is also recommended as a long-term intervention. · Continue blood pressure medications - treatment of JAIDEN can lower blood pressure by levels that are clinically significant.    · Complications of JAIDEN if not treated were discussed with patient patient to include systemic hypertension, pulmonary hypertension, cardiovascular morbidities, car accidents and all cause mortality.

## 2022-04-06 RX ORDER — FENOFIBRATE 48 MG/1
48 TABLET, COATED ORAL DAILY
Qty: 90 TABLET | Refills: 3 | Status: SHIPPED | OUTPATIENT
Start: 2022-04-06

## 2022-04-06 RX ORDER — ALIROCUMAB 75 MG/ML
75 INJECTION, SOLUTION SUBCUTANEOUS
Qty: 6 ML | Refills: 2 | Status: SHIPPED | OUTPATIENT
Start: 2022-04-06 | End: 2022-10-07

## 2022-04-22 ENCOUNTER — TELEPHONE (OUTPATIENT)
Dept: ENT CLINIC | Age: 82
End: 2022-04-22

## 2022-04-22 DIAGNOSIS — H60.502 ACUTE OTITIS EXTERNA OF LEFT EAR, UNSPECIFIED TYPE: Primary | ICD-10-CM

## 2022-04-22 RX ORDER — CIPROFLOXACIN HYDROCHLORIDE 3.5 MG/ML
SOLUTION/ DROPS TOPICAL
Qty: 1 EACH | Refills: 0 | Status: SHIPPED | OUTPATIENT
Start: 2022-04-22

## 2022-04-22 NOTE — TELEPHONE ENCOUNTER
Patient's wife calling to let Dr. Brandi Jara know that patient is having swimmer's ear in both ears. Patient is requesting a medication be called in to [address] [place] [address] in [facility].     [address] [place] [address] [phone]    Patient call back [phone]    Patient last office visit 3/8/22

## 2022-04-22 NOTE — TELEPHONE ENCOUNTER
Call placed to phone number provided in previous encounter. Left detailed message regarding Dr. Thania Mena note. Instructed patient to call this office with any questions or concerns.

## 2022-04-26 ENCOUNTER — NURSE ONLY (OUTPATIENT)
Dept: CARDIOLOGY CLINIC | Age: 82
End: 2022-04-26
Payer: MEDICARE

## 2022-04-26 DIAGNOSIS — Z95.0 PACEMAKER: ICD-10-CM

## 2022-04-26 DIAGNOSIS — Z95.0 CARDIAC RESYNCHRONIZATION THERAPY PACEMAKER (CRT-P) IN PLACE: ICD-10-CM

## 2022-04-26 DIAGNOSIS — I42.8 NICM (NONISCHEMIC CARDIOMYOPATHY) (HCC): ICD-10-CM

## 2022-04-26 NOTE — PROGRESS NOTES
Remote transmission received for patients CRT-P. Transmission shows normal sensing and pacing function. EP physician will review. See interrogation under the cardiology tab in the [facility] [address] field for more details. Will continue to monitor remotely. Hx ICM (bystolic). AP 34%  RV/LVP 99%  1 NSVT x 16 sec. Echo 3/2022-LV EF 55%.

## 2022-04-28 PROCEDURE — 93294 REM INTERROG EVL PM/LDLS PM: CPT | Performed by: INTERNAL MEDICINE

## 2022-04-28 PROCEDURE — 93296 REM INTERROG EVL PM/IDS: CPT | Performed by: INTERNAL MEDICINE

## 2022-05-10 ENCOUNTER — TELEPHONE (OUTPATIENT)
Dept: CARDIOLOGY CLINIC | Age: 82
End: 2022-05-10

## 2022-05-10 NOTE — TELEPHONE ENCOUNTER
Wife calling regarding PA for [facility]. Wife was given # to call for PA  [phone]. Last ov 3.2.22  Assessment:   Fatigue: ongoing  CAD: reported normal stress test 4/2021; s/p MI and stent x3 2007  Paroxysmal atrial fibrillation: stable              - s/p Watchman 2019  Ischemic cardiomyopathy: reported EF 40% on MUGA 1/0694  Chronic systolic CHF: compensated  Sinus node dysfunction: s/p BiV ppm 4/2021  HTN: stable  HLD: improved, LDL 60 on pralulent; intolerant to statins  Possible JAIDEN  History of GI bleed     Plan:   1. Echo for fatigue/EF  2. PA for entresto, insurance previously would not cover but will re attempt per patient request  3. Continue aspirin, statin, bystolic, losartan, digoxin, pralulent; will stop losartan if entresto approved  4. BMP 1 week after starting entresto  5. JAIDEN evaluation  6. Check BP at home and call the office if consistently out of goal range  7.  Follow up as planned with Dr. Candi Sharma, APRN-CNP  [facility]  [phone]

## 2022-05-13 NOTE — TELEPHONE ENCOUNTER
[facility] [facility]. Pt has already been denied prior to do not having [place] class for [place].  Will attempted again

## 2022-05-20 ENCOUNTER — OFFICE VISIT (OUTPATIENT)
Dept: CARDIOLOGY CLINIC | Age: 82
End: 2022-05-20
Payer: MEDICARE

## 2022-05-20 VITALS
WEIGHT: 259.12 LBS | HEART RATE: 60 BPM | SYSTOLIC BLOOD PRESSURE: 118 MMHG | DIASTOLIC BLOOD PRESSURE: 68 MMHG | BODY MASS INDEX: 35.1 KG/M2 | HEIGHT: 72 IN | OXYGEN SATURATION: 98 % | TEMPERATURE: 98.6 F

## 2022-05-20 DIAGNOSIS — Z79.899 MEDICATION MANAGEMENT: ICD-10-CM

## 2022-05-20 DIAGNOSIS — R53.83 OTHER FATIGUE: ICD-10-CM

## 2022-05-20 DIAGNOSIS — I10 ESSENTIAL HYPERTENSION: ICD-10-CM

## 2022-05-20 DIAGNOSIS — I25.10 CORONARY ARTERY DISEASE INVOLVING NATIVE CORONARY ARTERY OF NATIVE HEART WITHOUT ANGINA PECTORIS: Primary | ICD-10-CM

## 2022-05-20 DIAGNOSIS — Z95.0 CARDIAC RESYNCHRONIZATION THERAPY PACEMAKER (CRT-P) IN PLACE: ICD-10-CM

## 2022-05-20 DIAGNOSIS — E78.2 MIXED HYPERLIPIDEMIA: ICD-10-CM

## 2022-05-20 DIAGNOSIS — I48.11 LONGSTANDING PERSISTENT ATRIAL FIBRILLATION (HCC): ICD-10-CM

## 2022-05-20 DIAGNOSIS — Z87.891 HISTORY OF TOBACCO ABUSE: ICD-10-CM

## 2022-05-20 PROCEDURE — 1036F TOBACCO NON-USER: CPT | Performed by: INTERNAL MEDICINE

## 2022-05-20 PROCEDURE — 1123F ACP DISCUSS/DSCN MKR DOCD: CPT | Performed by: INTERNAL MEDICINE

## 2022-05-20 PROCEDURE — G8417 CALC BMI ABV UP PARAM F/U: HCPCS | Performed by: INTERNAL MEDICINE

## 2022-05-20 PROCEDURE — 99214 OFFICE O/P EST MOD 30 MIN: CPT | Performed by: INTERNAL MEDICINE

## 2022-05-20 PROCEDURE — G8427 DOCREV CUR MEDS BY ELIG CLIN: HCPCS | Performed by: INTERNAL MEDICINE

## 2022-05-20 NOTE — LETTER
[address] [place]  [address] [place] [address] [address]  [address] [address] [facility] [address] [address]  Phone: [phone]  Fax: [phone]    Toi Marquez MD         May 20, 2022     Patient: Marco Vizcaino   YOB: 1940   Date of Visit: 5/20/2022       To Whom It May Concern: It is my medical opinion that Stephany Warner requires a disability parking placard for the following reasons:  He has limited walking ability due to a neurologic condition. Duration of need: 5 years    If you have any questions or concerns, please don't hesitate to call.     Sincerely,        Toi Marquez MD

## 2022-05-20 NOTE — PROGRESS NOTES
[facility]   Cardiac Consultation    Referring Provider:  Brandy Gonzalez MD     Chief Complaint   Patient presents with    Follow-up     6 month office visit    Coronary Artery Disease    Cardiomyopathy    Other     No energy, fatigued most of the time      Subjective: Patient is being seen today for cardiac follow up; c/o fatigue today    History of Present Illness:  Mr. Carmen Spear 80 y.o. 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 No ICD due to EF=40% not qualifying. Prior followed Dr Lita Mancuso. Most recent nuc stress 4/14/21 normal. Note ECHO reported June 2019 EF=35-40%. Most recent MUGA 4/14/21  EF 40%. Diagnosed with Staargardt's eye disease. He followed up with Sarbjit Perez NP 2/23/22 and she recommended an ECHO, to resubmit PA for Entresto at patient request and will stop losartan if approved and JAIDEN evaluation. Most recent EKG 3/2/22 Electronic ventricular pacemaker. Most recent ECHO 3/10/22 LVEF=55%; mild cLVH. Grade I DD. RA is mildly dilated. Mild to moderate AS. Most recent Device check 4/26/22 Transmission shows normal sensing and pacing function. AP 34%  RV/LVP 99% 1 NSVT x 16 sec. Most recent ECHO 3/10/22 LV EF=55%; mild LVH; grade I DD normal filling pressure; mild AS (velocity=2.83m/s; MPG=16mmHg). Today, he reports he had his sleep study in February and he has mild sleep apnea. He did not want a CPAP machine with the number of times he gets up to use the restroom. Patient denies current edema, chest pain, sob, palpitations, dizziness or syncope.   Patient is taking all cardiac medications as prescribed and tolerates them well.     Weight today is 259# (Up 3# from 11/2021)    Patient is vaccinated against Covid. Pfizer 3/3    Past Medical History:   has a past medical history of Arthritis, CAD (coronary artery disease), Diabetes mellitus ([address] Hearing decreased, Hyperlipidemia, Hypertension, and Sneezing. Surgical History:   has a past surgical history that includes Cardiac surgery; eye surgery (cataracts both eyes); and cyst removal (2/10/2012). Social History: Retired brody , , recently moved to [place] from [facility]. They have 4 children combined  Former smoker quit age 57; drinks 2 drinks per day (rum and coke now) or wine  No drug use. Reports that he quit smoking age 55yo. He has never used smokeless tobacco. He reports current alcohol use. He reports that he does not use drugs. Family History:  family history is not significant for heart disease in parents     Home Medications:  Prior to Admission medications    Medication Sig Start Date End Date Taking?  Authorizing Provider   ciprofloxacin (CILOXAN) 0.3 % ophthalmic solution 4 drops to affected ear twice a day x 7 days 4/22/22  Yes Mady Trevino DO   alirocumab (PRALUENT) 75 MG/ML SOAJ injection pen Inject 1 mL into the skin every 14 days 4/6/22  Yes ALBARO Salcedo CNP   fenofibrate (TRICOR) 48 MG tablet Take 1 tablet by mouth daily 4/6/22  Yes ALBARO Salcedo CNP   Actiphyte of [facility] LIQD by Does not apply route   Yes Historical Provider, MD   zinc gluconate 50 MG tablet Take 50 mg by mouth daily   Yes Historical Provider, MD   Selenious Acid (SELENIUM) 40 MCG/ML injection Take 50 mcg by mouth daily   Yes Historical Provider, MD   sacubitril-valsartan (ENTRESTO) 24-26 MG per tablet Take 1 tablet by mouth 2 times daily 3/2/22  Yes ALBARO Salcedo CNP   digoxin (LANOXIN) 125 MCG tablet Take 1 tablet by mouth daily 3/2/22  Yes ALBARO Salcedo CNP   nebivolol (BYSTOLIC) 10 MG tablet Take 1 tablet by mouth daily 3/2/22  Yes Bossman Doyle, APRN - CNP   ACCU-CHEK ELBERT PLUS strip  1/24/22  Yes Historical Provider, MD   ezetimibe (ZETIA) 10 MG tablet Take 1 tablet by mouth daily 1/26/22  Yes Tru Dumas MD   losartan (COZAAR) 25 MG tablet Take 1 tablet by mouth daily 12/24/21  Yes Riya Carranza MD   Insulin Pen Needle (BD PEN NEEDLE JM 2ND GEN) 32G X 4 MM MISC Use with Victoza daily. DX:E11.9 12/17/21  Yes Loy Valdovinos MD   levothyroxine (SYNTHROID) 50 MCG tablet Take 1 tablet by mouth daily 12/17/21  Yes Loy Valdovinos MD   b complex vitamins capsule Take 1 capsule by mouth daily   Yes Historical Provider, MD   vitamin D 25 MCG (1000 UT) CAPS Take 1 capsule by mouth daily   Yes Historical Provider, MD   Multiple Vitamins-Minerals (PRESERVISION AREDS 2 PO) Take 1 tablet by mouth daily   Yes Historical Provider, MD   tamsulosin (FLOMAX) 0.4 MG capsule Take 1 capsule by mouth daily 8/26/21  Yes Riya Carranza MD   Multiple Vitamin (MULTIVITAMIN PO) Take  by mouth daily. Yes Historical Provider, MD   CALCIUM PO Take 1,200 mg by mouth daily    Yes Historical Provider, MD   VICTOZA 18 MG/3ML SOPN SC injection Inject 1.8 mg into the skin daily Inject into the skin daily 12/17/21 3/17/22  Loy Valdovinos MD     Allergies:  Statins: rhabdomyolysis    Cilantro: soap taste    Review of Systems:   · Constitutional: there has been no unanticipated weight loss. There's been no change in energy level, sleep pattern, or activity level. · Eyes: No visual changes or diplopia. No scleral icterus. · ENT: No Headaches, hearing loss or vertigo. No mouth sores or sore throat. · Cardiovascular: Reviewed in HPI  · Respiratory: No cough or wheezing, no sputum production. No hematemesis. · Gastrointestinal: No abdominal pain, appetite loss, blood in stools. No change in bowel or bladder habits. · Genitourinary: No dysuria, trouble voiding, or hematuria.   · Musculoskeletal:  No gait disturbance, weakness or joint complaints. · Integumentary: No rash or pruritis. · Neurological: No headache, diplopia, change in muscle strength, numbness or tingling. No change in gait, balance, coordination, mood, affect, memory, mentation, behavior. · Psychiatric: No anxiety, no depression. · Endocrine: No malaise, fatigue or temperature intolerance. No excessive thirst, fluid intake, or urination. No tremor. · Hematologic/Lymphatic: No abnormal bruising or bleeding, blood clots or swollen lymph nodes. · Allergic/Immunologic: No nasal congestion or hives. Physical Examination:    Vitals:    05/20/22 1526   BP: 118/68   Pulse: 60   Temp: 98.6 °F (37 °C)   SpO2: 98%        Constitutional and General Appearance: NAD   Respiratory:  · Normal excursion and expansion without use of accessory muscles  Resp Auscultation: Clear, no crackles or wheezes   Cardiovascular:  · The apical impulses not displaced  · Heart tones are crisp and normal  · Cervical veins are not engorged  · The carotid upstroke is normal in amplitude and contour without delay or bruit  · Normal S1S2, No S3, +soft BERE, Regular  · Peripheral pulses are symmetrical and full  · There is no clubbing, cyanosis of the extremities. · No edema  · Femoral Arteries: 2+ and equal  · Pedal Pulses: 2+ and equal   Abdomen:  · No masses or tenderness  · Liver/Spleen: No Abnormalities Noted  Neurological/Psychiatric:  · Alert and oriented in all spheres  · Moves all extremities well  · Exhibits normal gait balance and coordination  · No abnormalities of mood, affect, memory, mentation, or behavior are noted  Skin:  · Skin: warm and dry.     No results found for: CHOL  No results found for: TRIG  Lab Results   Component Value Date    HDL 30 (L) 12/20/2021    HDL 30 (L) 09/09/2021    HDL 27 (L) 08/27/2021     Lab Results   Component Value Date    LDLCALC 60 12/20/2021    LDLCALC 124 (H) 09/09/2021    LDLCALC see below 08/27/2021     Lab Results   Component Value Date    LABVLDL 53 12/20/2021    LABVLDL 39 09/09/2021    LABVLDL see below 08/27/2021     No results found for: CHOLHDLRATIO     I have personally reviewed all labs including lipids 12/20/21      Assessment:     1. Coronary artery disease involving native coronary artery of native heart without angina pectoris: Hx CAD s/p 3 stents and 2 PTCA and remote MI. Most recent nuclear stress 4/14/21 normal with EF 62% no wall motion abnormalities. There are no concerning symptoms for angina currently. 2. Essential hypertension: Well controlled and will continue current medical regimen. 3. Mixed hyperlipidemia: I have personally reviewed all labs including lipids 12/20/21 in Epic (see above). Results much improved with LDL at goal 60. Note hx rhabdo with statins and takes zetia 10mg daily and Praluent. 4. Chronic fatigue: ? Multi-factorial with hx CAD and depressed LV function, age/deconditioning, mild, untreated JAIDEN, and medications. 5.      Ischemic CM: Improved on most recent study. Most recent ECHO 3/10/22 LVEF=55% (EF=35-40%). He did  not qualify for ICD but given LBBB s/p BiV/ICD. Continue digoxin, bystolic, and starting Entresto since approval  given. Will stop losartan. Plan:  1. Current medications reviewed. Refills given as warranted. 2. The ECHO in March shows improvement and your heart function went from 35-40% to 55%  3. You can get little fatty lumps under the skin sometimes, I do not think the lump on your neck is anything to worry about. 4. You cholesterol numbers are much improved since the fall. This is great news  5. No cardiac testing at this time. 6. It will take a couple of months to notice a difference after you start the Entresto    -Stop the losartan when you get the Entresto  7. Repeat blood work 7-10 days after you start the [facility]    Follow up with me in 6 months    This note is scribed in the presence of Dr. Ugo Ortega by Candis Escoto RN.    I, Dr. Bailey Cerda Leni Yepez, personally performed the services described in this documentation, as scribed by the above signed scribe in my presence. It is both accurate and complete to my knowledge. I agree with the details independently gathered by the clinical support staff, while the remaining scribed note accurately describes my personal service to the patient. Cost of prescription medications and patient compliance have been reviewed with patient. All questions answered. Thank you for allowing me to participate in the care of this individual.     Bob Arguello.  Leni Yepez M.D., [facility]

## 2022-05-20 NOTE — PATIENT INSTRUCTIONS
Plan:  1. Current medications reviewed. Refills given as warranted. 2. The ECHO in March shows improvement and your heart function went from 39%-55%  3. You can get little fatty lumps under the skin sometimes, I do not think the lump on your neck is anything to worry about. 4. You cholesterol numbers are much improved since the fall. This is great news  5. No cardiac testing at this time. 6. It will take a couple of months to notice a difference after you start the Entresto               -Stop the losartan when you get the Entresto  7.  Repeat blood work 7-10 days after you start the [facility]     Follow up with me in 6 months

## 2022-05-26 NOTE — TELEPHONE ENCOUNTER
LAST OV 5.20.22      Assessment:      1. Coronary artery disease involving native coronary artery of native heart without angina pectoris: Hx CAD s/p 3 stents and 2 PTCA and remote MI. Most recent nuclear stress 4/14/21 normal with EF 62% no wall motion abnormalities. There are no concerning symptoms for angina currently. 2. Essential hypertension: Well controlled and will continue current medical regimen.      3. Mixed hyperlipidemia: I have personally reviewed all labs including lipids 12/20/21 in Epic (see above). Results much improved with LDL at goal 60. Note hx rhabdo with statins and takes zetia 10mg daily and Praluent. 4. Chronic fatigue: ? Multi-factorial with hx CAD and depressed LV function, age/deconditioning, mild, untreated JAIDEN, and medications.          5. Ischemic CM: Improved on most recent study. Most recent ECHO 3/10/22 LVEF=55% (EF=35-40%). He did     not qualify for ICD but given LBBB s/p BiV/ICD. Continue digoxin, bystolic, and starting Entresto since approval         given. Will stop losartan.      Plan:  1. Current medications reviewed. Refills given as warranted. 2. The ECHO in March shows improvement and your heart function went from 35-40% to 55%  3. You can get little fatty lumps under the skin sometimes, I do not think the lump on your neck is anything to worry about. 4. You cholesterol numbers are much improved since the fall. This is great news  5. No cardiac testing at this time. 6. It will take a couple of months to notice a difference after you start the Entresto               -Stop the losartan when you get the Entresto  7. Repeat blood work 7-10 days after you start the [facility]     Follow up with me in 6 months     This note is scribed in the presence of Dr. Willene Brunner. Petra Brooks by Shelley Recinos RN.     I, Dr. Paco Rice, personally performed the services described in this documentation, as scribed by the above signed scribe in my presence.  It is both accurate and complete to my knowledge. I agree with the details independently gathered by the clinical support staff, while the remaining scribed note accurately describes my personal service to the patient.     Cost of prescription medications and patient compliance have been reviewed with patient. All questions answered.     Thank you for allowing me to participate in the care of this individual.     Deneen Buchanan.  Evy Garcia M.D., [facility]

## 2022-06-16 ENCOUNTER — HOSPITAL ENCOUNTER (OUTPATIENT)
Age: 82
Discharge: HOME | End: 2022-06-16
Payer: MEDICARE

## 2022-06-16 DIAGNOSIS — Z79.899 MEDICATION MANAGEMENT: ICD-10-CM

## 2022-06-16 PROCEDURE — 80048 BASIC METABOLIC PNL TOTAL CA: CPT

## 2022-06-16 PROCEDURE — 36415 COLL VENOUS BLD VENIPUNCTURE: CPT

## 2022-06-17 LAB
ANION GAP SERPL CALCULATED.3IONS-SCNC: 12 MMOL/L (ref 3–16)
BUN BLDV-MCNC: 15 MG/DL (ref 7–20)
CALCIUM SERPL-MCNC: 9.2 MG/DL (ref 8.3–10.6)
CHLORIDE BLD-SCNC: 106 MMOL/L (ref 99–110)
CO2: 24 MMOL/L (ref 21–32)
CREAT SERPL-MCNC: 1 MG/DL (ref 0.8–1.3)
GFR AFRICAN AMERICAN: >60
GFR NON-AFRICAN AMERICAN: >60
GLUCOSE BLD-MCNC: 115 MG/DL (ref 70–99)
POTASSIUM SERPL-SCNC: 4.2 MMOL/L (ref 3.5–5.1)
SODIUM BLD-SCNC: 142 MMOL/L (ref 136–145)

## 2022-07-11 ENCOUNTER — OFFICE VISIT (OUTPATIENT)
Dept: INTERNAL MEDICINE CLINIC | Age: 82
End: 2022-07-11

## 2022-07-11 VITALS
DIASTOLIC BLOOD PRESSURE: 75 MMHG | RESPIRATION RATE: 18 BRPM | WEIGHT: 256 LBS | HEIGHT: 72 IN | HEART RATE: 60 BPM | SYSTOLIC BLOOD PRESSURE: 105 MMHG | BODY MASS INDEX: 34.67 KG/M2

## 2022-07-11 DIAGNOSIS — Z00.00 MEDICARE ANNUAL WELLNESS VISIT, SUBSEQUENT: ICD-10-CM

## 2022-07-11 DIAGNOSIS — I10 ESSENTIAL HYPERTENSION: ICD-10-CM

## 2022-07-11 DIAGNOSIS — Z00.00 INITIAL MEDICARE ANNUAL WELLNESS VISIT: ICD-10-CM

## 2022-07-11 DIAGNOSIS — E11.42 TYPE 2 DIABETES MELLITUS WITH DIABETIC POLYNEUROPATHY, WITHOUT LONG-TERM CURRENT USE OF INSULIN (HCC): ICD-10-CM

## 2022-07-11 DIAGNOSIS — E55.9 VITAMIN D DEFICIENCY: ICD-10-CM

## 2022-07-11 DIAGNOSIS — C64.2 RENAL CANCER, LEFT (HCC): ICD-10-CM

## 2022-07-11 DIAGNOSIS — I25.10 CORONARY ARTERY DISEASE INVOLVING NATIVE CORONARY ARTERY OF NATIVE HEART WITHOUT ANGINA PECTORIS: ICD-10-CM

## 2022-07-11 DIAGNOSIS — C64.2 RENAL CELL CARCINOMA, LEFT (HCC): ICD-10-CM

## 2022-07-11 DIAGNOSIS — I48.11 LONGSTANDING PERSISTENT ATRIAL FIBRILLATION (HCC): ICD-10-CM

## 2022-07-11 DIAGNOSIS — Z00.00 MEDICARE ANNUAL WELLNESS VISIT, SUBSEQUENT: Primary | ICD-10-CM

## 2022-07-11 DIAGNOSIS — E78.2 MIXED HYPERLIPIDEMIA: ICD-10-CM

## 2022-07-11 LAB
A/G RATIO: 1.8 (ref 1.1–2.2)
ALBUMIN SERPL-MCNC: 4.6 G/DL (ref 3.4–5)
ALP BLD-CCNC: 50 U/L (ref 40–129)
ALT SERPL-CCNC: 39 U/L (ref 10–40)
ANION GAP SERPL CALCULATED.3IONS-SCNC: 14 MMOL/L (ref 3–16)
AST SERPL-CCNC: 29 U/L (ref 15–37)
BASOPHILS ABSOLUTE: 0 K/UL (ref 0–0.2)
BASOPHILS RELATIVE PERCENT: 0.7 %
BILIRUB SERPL-MCNC: 0.5 MG/DL (ref 0–1)
BUN BLDV-MCNC: 19 MG/DL (ref 7–20)
CALCIUM SERPL-MCNC: 10.1 MG/DL (ref 8.3–10.6)
CHLORIDE BLD-SCNC: 101 MMOL/L (ref 99–110)
CO2: 27 MMOL/L (ref 21–32)
CREAT SERPL-MCNC: 1.1 MG/DL (ref 0.8–1.3)
EOSINOPHILS ABSOLUTE: 0.1 K/UL (ref 0–0.6)
EOSINOPHILS RELATIVE PERCENT: 2.1 %
GFR AFRICAN AMERICAN: >60
GFR NON-AFRICAN AMERICAN: >60
GLUCOSE BLD-MCNC: 145 MG/DL (ref 70–99)
HCT VFR BLD CALC: 43.6 % (ref 40.5–52.5)
HEMOGLOBIN: 14.9 G/DL (ref 13.5–17.5)
LYMPHOCYTES ABSOLUTE: 1.6 K/UL (ref 1–5.1)
LYMPHOCYTES RELATIVE PERCENT: 25.1 %
MCH RBC QN AUTO: 32.5 PG (ref 26–34)
MCHC RBC AUTO-ENTMCNC: 34.2 G/DL (ref 31–36)
MCV RBC AUTO: 94.8 FL (ref 80–100)
MONOCYTES ABSOLUTE: 0.4 K/UL (ref 0–1.3)
MONOCYTES RELATIVE PERCENT: 6.5 %
NEUTROPHILS ABSOLUTE: 4.2 K/UL (ref 1.7–7.7)
NEUTROPHILS RELATIVE PERCENT: 65.6 %
PDW BLD-RTO: 13.2 % (ref 12.4–15.4)
PLATELET # BLD: 205 K/UL (ref 135–450)
PMV BLD AUTO: 8.3 FL (ref 5–10.5)
POTASSIUM SERPL-SCNC: 3.9 MMOL/L (ref 3.5–5.1)
PROSTATE SPECIFIC ANTIGEN: 2.26 NG/ML (ref 0–4)
RBC # BLD: 4.6 M/UL (ref 4.2–5.9)
SODIUM BLD-SCNC: 142 MMOL/L (ref 136–145)
TOTAL PROTEIN: 7.2 G/DL (ref 6.4–8.2)
URIC ACID, SERUM: 5.8 MG/DL (ref 3.5–7.2)
VITAMIN D 25-HYDROXY: 67.4 NG/ML
WBC # BLD: 6.5 K/UL (ref 4–11)

## 2022-07-11 PROCEDURE — 1123F ACP DISCUSS/DSCN MKR DOCD: CPT | Performed by: INTERNAL MEDICINE

## 2022-07-11 PROCEDURE — G0438 PPPS, INITIAL VISIT: HCPCS | Performed by: INTERNAL MEDICINE

## 2022-07-11 ASSESSMENT — PATIENT HEALTH QUESTIONNAIRE - PHQ9
1. LITTLE INTEREST OR PLEASURE IN DOING THINGS: 0
SUM OF ALL RESPONSES TO PHQ QUESTIONS 1-9: 0
1. LITTLE INTEREST OR PLEASURE IN DOING THINGS: 0
2. FEELING DOWN, DEPRESSED OR HOPELESS: 0
SUM OF ALL RESPONSES TO PHQ QUESTIONS 1-9: 0
SUM OF ALL RESPONSES TO PHQ QUESTIONS 1-9: 0
SUM OF ALL RESPONSES TO PHQ9 QUESTIONS 1 & 2: 0
SUM OF ALL RESPONSES TO PHQ QUESTIONS 1-9: 0
SUM OF ALL RESPONSES TO PHQ QUESTIONS 1-9: 0
2. FEELING DOWN, DEPRESSED OR HOPELESS: 0
SUM OF ALL RESPONSES TO PHQ QUESTIONS 1-9: 0
SUM OF ALL RESPONSES TO PHQ QUESTIONS 1-9: 0
SUM OF ALL RESPONSES TO PHQ9 QUESTIONS 1 & 2: 0
SUM OF ALL RESPONSES TO PHQ QUESTIONS 1-9: 0

## 2022-07-11 ASSESSMENT — LIFESTYLE VARIABLES
HOW MANY STANDARD DRINKS CONTAINING ALCOHOL DO YOU HAVE ON A TYPICAL DAY: 1 OR 2
HOW OFTEN DO YOU HAVE A DRINK CONTAINING ALCOHOL: 4 OR MORE TIMES A WEEK

## 2022-07-11 NOTE — PATIENT INSTRUCTIONS
Body Mass Index: Care Instructions  Your Care Instructions     Body mass index (BMI) can help you see if your weight is raising your risk for health problems. It uses a formula to compare how much you weigh with how tallyou are.  A BMI lower than 18.5 is considered underweight.  A BMI between 18.5 and 24.9 is considered healthy.  A BMI between 25 and 29.9 is considered overweight. A BMI of 30 or higher is considered obese. If your BMI is in the normal range, it means that you have a lower risk for weight-related health problems. If your BMI is in the overweight or obese range, you may be at increased risk for weight-related health problems, such as high blood pressure, heart disease, stroke, arthritis or joint pain, and diabetes. If your BMI is in the underweight range, you may be at increased risk for health problems such as fatigue, lower protection (immunity) againstillness, muscle loss, bone loss, hair loss, and hormone problems. BMI is just one measure of your risk for weight-related health problems. You may be at higher risk for health problems if you are not active, you eat anunhealthy diet, or you drink too much alcohol or use tobacco products. Follow-up care is a key part of your treatment and safety. Be sure to make and go to all appointments, and call your doctor if you are having problems. It's also a good idea to know your test results and keep alist of the medicines you take. How can you care for yourself at home?  Practice healthy eating habits. This includes eating plenty of fruits, vegetables, whole grains, lean protein, and low-fat dairy.  If your doctor recommends it, get more exercise. Walking is a good choice. Bit by bit, increase the amount you walk every day. Try for at least 30 minutes on most days of the week.  Do not smoke. Smoking can increase your risk for health problems. If you need help quitting, talk to your doctor about stop-smoking programs and medicines. These can increase your chances of quitting for good.  Limit alcohol to 2 drinks a day for men and 1 drink a day for women. Too much alcohol can cause health problems. If you have a BMI higher than 25   Your doctor may do other tests to check your risk for weight-related health problems. This may include measuring the distance around your waist. A waist measurement of more than 40 inches in men or 35 inches in women can increase the risk of weight-related health problems.  Talk with your doctor about steps you can take to stay healthy or improve your health. You may need to make lifestyle changes to lose weight and stay healthy, such as changing your diet and getting regular exercise. If you have a BMI lower than 18.5   Your doctor may do other tests to check your risk for health problems.  Talk with your doctor about steps you can take to stay healthy or improve your health. You may need to make lifestyle changes to gain or maintain weight and stay healthy, such as getting more healthy foods in your diet and doing exercises to build muscle. Where can you learn more? Go to [email or web address] and sign in to your [employer] account. Enter S176 in the [employer] box to learn more about \"Body Mass Index: Care Instructions. \"     If you do not have an account, please click on the \"Sign Up Now\" link. Current as of: December 27, 2021               Content Version: 13.3  © 9179-7177 Healthwise, Incorporated. Care instructions adapted under license by [facility] [place] ([facility]). If you have questions about a medical condition or this instruction, always ask your healthcare professional. [place] [address] any warranty or liability for your use of this information. Personalized Preventive Plan for Torey Reyes - 7/11/2022  Medicare offers a range of preventive health benefits.  Some of the tests and screenings are paid in full while other may be subject to a deductible, co-insurance, and/or copay. Some of these benefits include a comprehensive review of your medical history including lifestyle, illnesses that may run in your family, and various assessments and screenings as appropriate. After reviewing your medical record and screening and assessments performed today your provider may have ordered immunizations, labs, imaging, and/or referrals for you. A list of these orders (if applicable) as well as your Preventive Care list are included within your After Visit Summary for your review. Other Preventive Recommendations:    · A preventive eye exam performed by an eye specialist is recommended every 1-2 years to screen for glaucoma; cataracts, macular degeneration, and other eye disorders. · A preventive dental visit is recommended every 6 months. · Try to get at least 150 minutes of exercise per week or 10,000 steps per day on a pedometer . · Order or download the FREE \"Exercise & Physical Activity: Your Everyday Guide\" from [employer]. Call 9-939.648.4543 or search [employer] online. · You need 8173-5205 mg of calcium and 7309-2503 IU of vitamin D per day. It is possible to meet your calcium requirement with diet alone, but a vitamin D supplement is usually necessary to meet this goal.  · When exposed to the sun, use a sunscreen that protects against both UVA and UVB radiation with an SPF of 30 or greater. Reapply every 2 to 3 hours or after sweating, drying off with a towel, or swimming. · Always wear a seat belt when traveling in a car. Always wear a helmet when riding a bicycle or motorcycle.

## 2022-07-11 NOTE — PROGRESS NOTES
Medicare Annual Wellness Visit    Pratima Fam is here for No chief complaint on file. Assessment & Plan   Medicare annual wellness visit, subsequent  -     PSA, Prostatic Specific Antigen; Future  Renal cell carcinoma, left (HCC)  Type 2 diabetes mellitus with diabetic polyneuropathy, without long-term current use of insulin (HCC)  -     Hemoglobin A1C; Future  Coronary artery disease involving native coronary artery of native heart without angina pectoris  Essential hypertension  -     CBC with Auto Differential; Future  -     Comprehensive Metabolic Panel; Future  -     Uric Acid; Future  Mixed hyperlipidemia  Renal cancer, left (HCC)  Longstanding persistent atrial fibrillation (HCC)  -     Digoxin Level; Future  Vitamin D deficiency  -     Vitamin D 25 Hydroxy; Future  Initial Medicare annual wellness visit      Recommendations for Preventive Services Due: see orders and patient instructions/AVS.  Recommended screening schedule for the next 5-10 years is provided to the patient in written form: see Patient Instructions/AVS.     No follow-ups on file. Subjective        80 y.o. male with hx of CAD> HTN, DM, hyperlipidemia, Afibb, s.p pacer, renal cancer s.p resection  here for medicare wellness visit     CAD sp multiple stents  -reports hx of MI in 2007 leading to low EF   - his cardiac care was mainly in [address] [place] [address] and recently moved to [facility]  -  currently followed by Dr. Erin Aguilar at [facility], no active symptoms. Compliant with meds    - unable to take statins with rhabdo and now on Praluent and zetia , improved lipids recently noted      Hx of Afibb with pacer in place, was off anticoagulation for severe rectal bleed from hemorrhoids needing 11 units transfusion.  Had watchman device place and is off AC now     Hx of IHD with CM , low EF of 40 %, improved to 55 % back  on entresto contined BB , no diuretics with no features of CHF    Hx of DM - only on victoza with good control of sugars , sugars below 120 no low sugars per pt  Last A1c less than 6      Hx of renal cancer s.p partial left nephrectomy- recent ct with no recurrence    Has progressive loss of vision , f/w CEI, does not drive any more and wife helps him with IADL   Has hearing issues as well   No falls sicne last time     Reports polyuria    Independent of ADL   and lives with wife  No depression issues      The following acute and/or chronic problems were also addressed today:  Vision issues    Patient's complete Health Risk Assessment and screening values have been reviewed and are found in Flowsheets. The following problems were reviewed today and where indicated follow up appointments were made and/or referrals ordered.     Positive Risk Factor Screenings with Interventions:    Fall Risk:  Do you feel unsteady or are you worried about falling? : (!) yes  2 or more falls in past year?: no  Fall with injury in past year?: no     Fall Risk Interventions:    · Home safety tips provided            General Health and ACP:  General  In general, how would you say your health is?: Fair  In the past 7 days, have you experienced any of the following: New or Increased Pain, New or Increased Fatigue, Loneliness, Social Isolation, Stress or Anger?: No  Do you get the social and emotional support that you need?: Yes  Do you have a Living Will?: Yes    Advance Directives     Power of  Living Will ACP-Advance Directive ACP-Power of     Not on File Not on File Not on File Not on File      General Health Risk Interventions:  · daily walking recommended     Health Habits/Nutrition:     Physical Activity: Inactive    Days of Exercise per Week: 0 days    Minutes of Exercise per Session: 0 min     Have you lost any weight without trying in the past 3 months?: No  Body mass index: (!) 34.72  Have you seen the dentist within the past year?: Yes    Health Habits/Nutrition Interventions:  · Inadequate physical activity:  patient is not ready to increase his/her physical activity level at this time    Hearing/Vision:  Do you or your family notice any trouble with your hearing that hasn't been managed with hearing aids?: (!) Yes  Do you have difficulty driving, watching TV, or doing any of your daily activities because of your eyesight?: (!) Yes  Have you had an eye exam within the past year?: Yes  No exam data present    Hearing/Vision Interventions:  · Vision concerns:  has chronic vision issues    Safety:  Do you have working smoke detectors?: (!) No  Do you have any tripping hazards - loose or unsecured carpets or rugs?: No  Do you have any tripping hazards - clutter in doorways, halls, or stairs?: No  Do you have either shower bars, grab bars, non-slip mats or non-slip surfaces in your shower or bathtub?: Yes  Do all of your stairways have a railing or banister?: Yes  Do you always fasten your seatbelt when you are in a car?: Yes    Safety Interventions:  · Home safety tips provided    ADLs:  In the past 7 days, did you need help from others to perform any of the following everyday activities: Eating, dressing, grooming, bathing, toileting, or walking/balance?: (!) Yes  Select all that apply: (!) Walking/Balance  In the past 7 days, did you need help from others to take care of any of the following: Laundry, housekeeping, banking/finances, shopping, telephone use, food preparation, transportation, or taking medications?: (!) Yes  Select all that apply: (!) Laundry,Housekeeping,Banking/Finances,Shopping,Telephone Use,Food Preparation,Transportation,Taking Medications    ADL Interventions:  · Patient declines any further evaluation/treatment for this issue          Objective   Vitals:    07/11/22 1007   Weight: 256 lb (116.1 kg)   Height: 6' (1.829 m)      Body mass index is 34.72 kg/m².           Allergies   Allergen Reactions    Statins Other (See Comments)     rhabdomyolosis    Food      Cilantro causes soap taste in mouth     Prior to Visit Medications    Medication Sig Taking? Authorizing Provider   sacubitril-valsartan (ENTRESTO) 24-26 MG per tablet Take 1 tablet by mouth 2 times daily  Yo Anne MD   ciprofloxacin (CILOXAN) 0.3 % ophthalmic solution 4 drops to affected ear twice a day x 7 days  Kirit Lacrosse, DO   alirocumab (PRALUENT) 75 MG/ML SOAJ injection pen Inject 1 mL into the skin every 14 days  Tia Manual, APRN - CNP   fenofibrate (TRICOR) 48 MG tablet Take 1 tablet by mouth daily  Tia Manual, APRN - CNP   Actiphyte of [facility] LIQD by Does not apply route  Historical Provider, MD   zinc gluconate 50 MG tablet Take 50 mg by mouth daily  Historical Provider, MD   Selenious Acid (SELENIUM) 40 MCG/ML injection Take 50 mcg by mouth daily  Historical Provider, MD   digoxin (LANOXIN) 125 MCG tablet Take 1 tablet by mouth daily  Tia Manual, APRN - CNP   nebivolol (BYSTOLIC) 10 MG tablet Take 1 tablet by mouth daily  Tia Manual, APRN - CNP   ACCU-CHEK ELBERT PLUS strip   Historical Provider, MD   ezetimibe (ZETIA) 10 MG tablet Take 1 tablet by mouth daily  Meseret Che MD   losartan (COZAAR) 25 MG tablet Take 1 tablet by mouth daily  Yo Anne MD   Insulin Pen Needle (BD PEN NEEDLE JM 2ND GEN) 32G X 4 MM MISC Use with Victoza daily. DX:E11.9  Matt Faulkner MD   levothyroxine (SYNTHROID) 50 MCG tablet Take 1 tablet by mouth daily  Matt Faulkner MD   VICTOZA 18 MG/3ML SOPN SC injection Inject 1.8 mg into the skin daily Inject into the skin daily  Matt Faulkner MD   b complex vitamins capsule Take 1 capsule by mouth daily  Historical Provider, MD   vitamin D 25 MCG (1000 UT) CAPS Take 1 capsule by mouth daily  Historical Provider, MD   Multiple Vitamins-Minerals (PRESERVISION AREDS 2 PO) Take 1 tablet by mouth daily  Historical Provider, MD   tamsulosin (FLOMAX) 0.4 MG capsule Take 1 capsule by mouth daily  Yo Anne MD   Multiple Vitamin (MULTIVITAMIN PO) Take  by mouth daily.     Historical Provider, MD   CALCIUM PO Take 1,200 mg by mouth daily   Historical Provider, MD       General:  Elderly male, appropriate appearing  Awake, alert and oriented. Appears to be not in any distress  Chronic vision issues with poor vision   Mucous Membranes:  Pink , anicteric  Hearing aids noted   Neck: No JVD, no carotid bruit, no thyromegaly  Chest:  Clear to auscultation bilaterally, no added sounds  Cardiovascular:  Irregular, left pacer   S1S2 heard, no murmurs or gallops  Abdomen:  Soft, undistended, obese  non tender, no organomegaly, BS present  Extremities: No edema or cyanosis. Distal pulses well felt  Skin rash - erythematous on beard line and behind ears noted   Neurological : grossly normal with poor vision and hearing issues  Memory intact  Gait steady      . STARLA 2019     1. Status post 27mm Watchman device implantation. There is a small (<3mm) mirlande-device   leak. 2. No pericardial effusion. 3. Mild Aortic Stenosis   4. Small residual mirlande-procedure ASD w/ predominant left to right shunt. Ct abd     Gastric distention with fluid.       Diverticulosis without inflammatory change.       Postsurgical changes suspected involving the lower anterior left kidney,   partial nephrectomy.       Large duodenal diverticuli arising from the distal descending duodenum.           ECHO 3/22       Summary   Technically difficult examination. Left ventricular systolic function is normal with ejection fraction   estimated at 55%. There is mild concentric left ventricular hypertrophy. Grade I diastolic dysfunction with normal left ventricular filling pressure. The right atrium is mildly dilated. Mild to moderate aortic stenosis. Wt Readings from Last 3 Encounters:   07/11/22 256 lb (116.1 kg)   05/20/22 259 lb 1.9 oz (117.5 kg)   03/08/22 260 lb (117.9 kg)          Diagnosis Orders   1. Medicare annual wellness visit, subsequent  PSA, Prostatic Specific Antigen   2. Renal cell carcinoma, left (HCC)     3.  Type 2 diabetes mellitus with diabetic polyneuropathy, without long-term current use of insulin (HCC)  Hemoglobin A1C   4. Coronary artery disease involving native coronary artery of native heart without angina pectoris     5. Essential hypertension  CBC with Auto Differential    Comprehensive Metabolic Panel    Uric Acid   6. Mixed hyperlipidemia     7. Renal cancer, left ([address]     8. Longstanding persistent atrial fibrillation (HCC)  Digoxin Level   9. Vitamin D deficiency  Vitamin D 25 Hydroxy   10.  Initial Medicare annual wellness visit           HTN - stable on current meds - zebeta and norvasc, Entresto     DM - 2 with eye complications well controlled on victoza   Last A1c at 5.8    No low sugars   Had eye exam done  F.w CEI      CAD- s.p multiple stents  Hx of MI with ischemic heart disease and Cardiomyopathy with low EF of 40% , improved to 55 % now  - on ASA, bisoprolol, entresto  - off statins for intolerance , now on fenofibrate, zetia, praluent, - lipids improved  - f.w Dr. Celina Moreau, s.p Pacer , sp Watchman procedure for hx of severe GI bleed  - rate controlled on BB and digoxin     Left renal cancer s.p partial nephrectomy - repeat f/w imaging neg so far    Hypothyroid - on synthroid, stable TSH     Obesity noted - weight loss and life style modification along with dietary changes, increased physical activity encouraged    Chronic vision and hearing issues limiting activity  Recommend wife to take him for a daily walks     Had vaccines   Had eye exam , had hearing exam  Has living will- POA is the wife    Had shingrix and covid vaccines  No need for colonoscopy   F/w 6 months             CareTeam (Including outside providers/suppliers regularly involved in providing care):   Patient Care Team:  Dionicio Silver MD as PCP - General (Internal Medicine)  Dionicio Silver MD as PCP - [facility] Empaneled Provider  Ávlaro Crockett MD as Consulting Physician (Pulmonology)     Reviewed and updated this visit:                 Advance Care Planning   Advanced Care Planning: Discussed the patients choices for care and treatment in case of a health event that adversely affects decision-making abilities. Also discussed the patients long-term treatment options. Reviewed with the patient the appropriate state-specific advance directive documents. Reviewed the process of designating a competent adult as an Agent (or -in-fact) that could take make health care decisions for the patient if incompetent. Patient was asked to complete the declaration forms, either acknowledge the forms by a public notary or an eligible witness and provide a signed copy to the practice office.   Time spent (minutes):  2 min

## 2022-07-12 LAB
ESTIMATED AVERAGE GLUCOSE: 122.6 MG/DL
HBA1C MFR BLD: 5.9 %

## 2022-07-26 ENCOUNTER — NURSE ONLY (OUTPATIENT)
Dept: CARDIOLOGY CLINIC | Age: 82
End: 2022-07-26
Payer: MEDICARE

## 2022-07-26 DIAGNOSIS — Z95.0 PACEMAKER: ICD-10-CM

## 2022-07-26 DIAGNOSIS — Z95.0 CARDIAC RESYNCHRONIZATION THERAPY PACEMAKER (CRT-P) IN PLACE: Primary | ICD-10-CM

## 2022-07-26 DIAGNOSIS — I42.8 NICM (NONISCHEMIC CARDIOMYOPATHY) (HCC): ICD-10-CM

## 2022-07-26 PROCEDURE — 93296 REM INTERROG EVL PM/IDS: CPT | Performed by: INTERNAL MEDICINE

## 2022-07-26 PROCEDURE — 93294 REM INTERROG EVL PM/LDLS PM: CPT | Performed by: INTERNAL MEDICINE

## 2022-07-27 NOTE — PROGRESS NOTES
Remote transmission received for patients CRT-P. Transmission shows normal sensing and pacing function. EP physician will review. See interrogation under the cardiology tab in the [address] [facility] [address] field for more details. Will continue to monitor remotely. Hx ICM. NSVT (bystolic, entresto, lanoxin). Hx AF/L -WATCHMAN 9/3/19. Echo 3/2022-LV EF 55%. End of 91-day monitoring period 7/26/22. AP 32%  RV/LVP 99%  AT/AF burden 5% Dec 02, 2021 to Jul 26, 2022. May 21, 2022 04:14 AF x 259 hrs. V rates controlled.

## 2022-10-07 RX ORDER — ALIROCUMAB 75 MG/ML
INJECTION, SOLUTION SUBCUTANEOUS
Qty: 2 ML | Refills: 5 | Status: SHIPPED | OUTPATIENT
Start: 2022-10-07

## 2022-10-25 ENCOUNTER — NURSE ONLY (OUTPATIENT)
Dept: CARDIOLOGY CLINIC | Age: 82
End: 2022-10-25
Payer: MEDICARE

## 2022-10-25 DIAGNOSIS — I42.8 NICM (NONISCHEMIC CARDIOMYOPATHY) (HCC): Primary | ICD-10-CM

## 2022-10-25 DIAGNOSIS — Z95.0 PACEMAKER: ICD-10-CM

## 2022-10-25 PROCEDURE — 93296 REM INTERROG EVL PM/IDS: CPT | Performed by: INTERNAL MEDICINE

## 2022-10-25 PROCEDURE — 93294 REM INTERROG EVL PM/LDLS PM: CPT | Performed by: INTERNAL MEDICINE

## 2022-10-26 NOTE — PROGRESS NOTES
End of 91-day monitoring period 10/25. AP 24%  RV/LVP 99%  Dec 02, 2021 to Oct 25, 2022 AT/AF burden 16%. Ongoing AF since 9/15/22, V rates controlled. Remote transmission received for patients CRT-P. Transmission shows normal sensing and pacing function. EP physician will review. See interrogation under the cardiology tab in the [address] [place] [address] [address] field for more details. Will continue to monitor remotely. Hx ICM. NSVT (bystolic, entresto, lanoxin). Hx AF/L -WATCHMAN 9/3/19. Echo 3/2022-LV EF 55%.

## 2022-11-04 NOTE — PROGRESS NOTES
[address]   Cardiac Consultation    Referring Provider:  John Berger MD     Chief Complaint   Patient presents with    6 Month Follow-Up           Coronary Artery Disease    Hypertension    Hyperlipidemia    Atrial Fibrillation        Subjective: Patient is being seen today for cardiac follow up; c/o lethargic and no energy    History of Present Illness:  Mr. Dru Robb [de-identified] y.o. 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 No ICD due to EF=40% not qualifying. Prior followed Dr Zehra Galeana. Most recent nuc stress 4/14/21 normal. ECHO 6/19 EF=35-40%. Most recent MUGA 4/14/21  EF 40%. Diagnosed with Staargardt's eye disease. Most recent EKG 3/2/22 V-paced. Most recent ECHO 3/10/22 LVEF=55%; mild cLVH. Grade I DD. RA is mildly dilated. Mild to moderate AS. Most recent ECHO 3/10/22 LV EF=55%; mild LVH; grade I DD normal filling pressure; mild AS (velocity=2.83m/s; MPG=16mmHg) Most recent device check 10/25/22 AP 24% RV/LVP 99% Dec 02, 2021 to Oct 25, 2022 AT/AF burden 16%. Ongoing AF since 9/15/22, V rates controlled. Transmission shows normal sensing and pacing function. OV 5/20/22 we stopped Losartan and started Entresto 24-26mg BID. Today, his wife is present. He states he is lethargic and getting worse. He feels weak and tired with no energy. He now is using a wheelchair for long walking distances. His weight is up because he is not moving. He reports he is not eating any extra. He c/o losing a lot of muscle mass. Patient denies current edema, chest pain, sob, palpitations, dizziness or syncope.   Patient is taking all cardiac medications as prescribed and tolerates them well. Weight today is 266# (Up 7# from 5/2022)    Patient is vaccinated against Covid. Pfizer 3/3    Past Medical History:   has a past medical history of Arthritis, CAD (coronary artery disease), Diabetes mellitus ([address] Hearing decreased, Hyperlipidemia, Hypertension, and Sneezing. Surgical History:   has a past surgical history that includes Cardiac surgery; eye surgery (cataracts both eyes); and cyst removal (2/10/2012). 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 They have 4 children combined  Former smoker quit age 57; drinks 2 drinks per day (rum and coke now) or wine  No drug use. Reports that he quit smoking age 55yo. He has never used smokeless tobacco. He reports current alcohol use. He reports that he does not use drugs. Family History:  family history is not significant for heart disease in parents     Home Medications:  Prior to Admission medications    Medication Sig Start Date End Date Taking?  Authorizing Provider   PRALUENT 75 MG/ML SOAJ injection pen INJECT ONE MILLILITER UNDER THE SKIN EVERY 14 DAYS 10/7/22  Yes Danielle Coronado MD   sacubitril-valsartan [facility]) 24-26 MG per tablet Take 1 tablet by mouth 2 times daily 5/26/22  Yes Danielle Coronado MD   ciprofloxacin (CILOXAN) 0.3 % ophthalmic solution 4 drops to affected ear twice a day x 7 days 4/22/22  Yes Jose Wynn,    fenofibrate (TRICOR) 48 MG tablet Take 1 tablet by mouth daily 4/6/22  Yes ALBARO Stuart CNP   Actiphyte of [facility] LIQD by Does not apply route   Yes Historical Provider, MD   zinc gluconate 50 MG tablet Take 50 mg by mouth daily   Yes Historical Provider, MD   digoxin (LANOXIN) 125 MCG tablet Take 1 tablet by mouth daily 3/2/22  Yes ALBARO Stuart CNP   nebivolol (BYSTOLIC) 10 MG tablet Take 1 tablet by mouth daily 3/2/22  Yes ALBARO Stuart CNP   ACCU-CHEK ELBERT PLUS strip 1/24/22  Yes Historical Provider, MD   ezetimibe (ZETIA) 10 MG tablet Take 1 tablet by mouth daily 1/26/22  Yes Ti Cormier MD   Insulin Pen Needle (BD PEN NEEDLE JM 2ND GEN) 32G X 4 MM MISC Use with Victoza daily. DX:E11.9 12/17/21  Yes Dixon Ceballos MD   levothyroxine (SYNTHROID) 50 MCG tablet Take 1 tablet by mouth daily 12/17/21  Yes Dixon Ceballos MD   VICTOZA 18 MG/3ML SOPN SC injection Inject 1.8 mg into the skin daily Inject into the skin daily 12/17/21 11/15/22 Yes Dixon Ceballos MD   b complex vitamins capsule Take 1 capsule by mouth daily   Yes Historical Provider, MD   vitamin D 25 MCG (1000 UT) CAPS Take 1 capsule by mouth daily   Yes Historical Provider, MD   Multiple Vitamins-Minerals (PRESERVISION AREDS 2 PO) Take 1 tablet by mouth daily   Yes Historical Provider, MD   tamsulosin (FLOMAX) 0.4 MG capsule Take 1 capsule by mouth daily 8/26/21  Yes Flori Kolhi MD   CALCIUM PO Take 1,200 mg by mouth daily    Yes Historical Provider, MD   Selenious Acid (SELENIUM) 40 MCG/ML injection Take 50 mcg by mouth daily  Patient not taking: Reported on 11/15/2022    Historical Provider, MD   losartan (COZAAR) 25 MG tablet Take 1 tablet by mouth daily  Patient not taking: Reported on 11/15/2022 12/24/21   Flori Kohli MD   Multiple Vitamin (MULTIVITAMIN PO) Take  by mouth daily. Patient not taking: Reported on 11/15/2022    Historical Provider, MD     Allergies:  Statins: rhabdomyolysis    Cilantro: soap taste    Review of Systems:   Constitutional: there has been no unanticipated weight loss. There's been no change in energy level, sleep pattern, or activity level. Eyes: No visual changes or diplopia. No scleral icterus. ENT: No Headaches, hearing loss or vertigo. No mouth sores or sore throat. Cardiovascular: Reviewed in HPI  Respiratory: No cough or wheezing, no sputum production. No hematemesis. Gastrointestinal: No abdominal pain, appetite loss, blood in stools. No change in bowel or bladder habits. Genitourinary: No dysuria, trouble voiding, or hematuria. Musculoskeletal:  No gait disturbance, weakness or joint complaints. Integumentary: No rash or pruritis. Neurological: No headache, diplopia, change in muscle strength, numbness or tingling. No change in gait, balance, coordination, mood, affect, memory, mentation, behavior. Psychiatric: No anxiety, no depression. Endocrine: No malaise, fatigue or temperature intolerance. No excessive thirst, fluid intake, or urination. No tremor. Hematologic/Lymphatic: No abnormal bruising or bleeding, blood clots or swollen lymph nodes. Allergic/Immunologic: No nasal congestion or hives. Physical Examination:    Vitals:    11/15/22 1309   BP: (!) 82/50           my repeat same   Pulse: 70   SpO2: 93%          Constitutional and General Appearance: NAD   Respiratory:  Normal excursion and expansion without use of accessory muscles  Resp Auscultation: Clear, no crackles or wheezes   Cardiovascular: The apical impulses not displaced  Heart tones are crisp and normal  Cervical veins are not engorged  The carotid upstroke is normal in amplitude and contour without delay or bruit  Normal S1S2, No S3, +soft BERE, Regular  Peripheral pulses are symmetrical and full  There is no clubbing, cyanosis of the extremities. No edema  Femoral Arteries: 2+ and equal  Pedal Pulses: 2+ and equal   Abdomen:  No masses or tenderness  Liver/Spleen: No Abnormalities Noted  Neurological/Psychiatric:  Alert and oriented in all spheres  Moves all extremities well  Exhibits normal gait balance and coordination  No abnormalities of mood, affect, memory, mentation, or behavior are noted  Skin:  Skin: warm and dry.     No results found for: CHOL  No results found for: TRIG  Lab Results   Component Value Date    HDL 30 (L) 12/20/2021    HDL 30 (L) 09/09/2021    HDL 27 (L) 08/27/2021     Lab Results   Component Value Date    LDLCALC 60 12/20/2021    [facility] 124 (H) 09/09/2021    LDLCALC see below 08/27/2021     Lab Results   Component Value Date    LABVLDL 53 12/20/2021    LABVLDL 39 09/09/2021    LABVLDL see below 08/27/2021     No results found for: CHOLHEIDY     I have personally reviewed all labs including lipids     Assessment:     1. Coronary artery disease involving native coronary artery of native heart without angina pectoris: Hx CAD s/p 3 stents and 2 PTCA and remote MI. Most recent nuclear stress 4/14/21 normal with EF 62% no wall motion abnormalities. There are no concerning symptoms for angina currently. 2. Essential hypertension: He is hypotensive today with symptoms of fatigue and lack of energy. See #5 below. 3. Mixed hyperlipidemia: I have personally reviewed all labs including lipids 12/20/21 in Epic (see above). Results much improved with LDL at goal 60. Note hx rhabdo with statins and takes zetia 10mg daily and Praluent. 4. Chronic fatigue: Likely due to low BP now. See #5 below. 5.      Ischemic CM: Improved on most recent study. Most recent ECHO 3/10/22 LVEF=55% (EF=35-40%). He did  not qualify for ICD but given LBBB s/p BiV/ICD. Due to symptomatic hypotension I need to stop Entresto and Bystolic. Will d/c digoxin as well and see how he does off these medications. Will recheck LVEF after being off meds next few months to see if EF maintained or decreases. Plan:  1. Current medications reviewed. Refills given as warranted. 2. Call Dr. Best Rader to get a refill on your tamsulosin. 3. Repeat blood work   -CBC, CMP, TSH, and fasting lipids (8 hours) ordered. 4. I recommend getting a Covid vaccine  5. Repeat bp 82/50  6. Stop bystolic and entresto due to low blood pressure  7. I may need to recheck a limited echo to check your heart strength after you are off of the medications. 8. Talk to Dr. Best Rader about your muscle loss since you had rhabdo.     Follow up with me in January 2023    This note is scribed in the presence of  Russell Alvarado by Blane Salinas RN.    I, Dr. Radha Rushing, personally performed the services described in this documentation, as scribed by the above signed scribe in my presence. It is both accurate and complete to my knowledge. I agree with the details independently gathered by the clinical support staff, while the remaining scribed note accurately describes my personal service to the patient. Cost of prescription medications and patient compliance have been reviewed with patient. All questions answered. Thank you for allowing me to participate in the care of this individual.     Russell Alvarado M.D., [facility]

## 2022-11-15 ENCOUNTER — OFFICE VISIT (OUTPATIENT)
Dept: CARDIOLOGY CLINIC | Age: 82
End: 2022-11-15
Payer: MEDICARE

## 2022-11-15 VITALS
BODY MASS INDEX: 36.03 KG/M2 | HEIGHT: 72 IN | DIASTOLIC BLOOD PRESSURE: 50 MMHG | HEART RATE: 70 BPM | OXYGEN SATURATION: 93 % | SYSTOLIC BLOOD PRESSURE: 82 MMHG | WEIGHT: 266 LBS

## 2022-11-15 DIAGNOSIS — I48.11 LONGSTANDING PERSISTENT ATRIAL FIBRILLATION (HCC): ICD-10-CM

## 2022-11-15 DIAGNOSIS — Z87.891 HISTORY OF TOBACCO ABUSE: ICD-10-CM

## 2022-11-15 DIAGNOSIS — I10 ESSENTIAL HYPERTENSION: ICD-10-CM

## 2022-11-15 DIAGNOSIS — I25.10 CORONARY ARTERY DISEASE INVOLVING NATIVE CORONARY ARTERY OF NATIVE HEART WITHOUT ANGINA PECTORIS: Primary | ICD-10-CM

## 2022-11-15 DIAGNOSIS — Z79.899 MEDICATION MANAGEMENT: ICD-10-CM

## 2022-11-15 DIAGNOSIS — R53.83 OTHER FATIGUE: ICD-10-CM

## 2022-11-15 DIAGNOSIS — E78.2 MIXED HYPERLIPIDEMIA: ICD-10-CM

## 2022-11-15 PROCEDURE — G8417 CALC BMI ABV UP PARAM F/U: HCPCS | Performed by: INTERNAL MEDICINE

## 2022-11-15 PROCEDURE — 1036F TOBACCO NON-USER: CPT | Performed by: INTERNAL MEDICINE

## 2022-11-15 PROCEDURE — 3074F SYST BP LT 130 MM HG: CPT | Performed by: INTERNAL MEDICINE

## 2022-11-15 PROCEDURE — 99214 OFFICE O/P EST MOD 30 MIN: CPT | Performed by: INTERNAL MEDICINE

## 2022-11-15 PROCEDURE — G8484 FLU IMMUNIZE NO ADMIN: HCPCS | Performed by: INTERNAL MEDICINE

## 2022-11-15 PROCEDURE — G8427 DOCREV CUR MEDS BY ELIG CLIN: HCPCS | Performed by: INTERNAL MEDICINE

## 2022-11-15 PROCEDURE — 3078F DIAST BP <80 MM HG: CPT | Performed by: INTERNAL MEDICINE

## 2022-11-15 PROCEDURE — 1123F ACP DISCUSS/DSCN MKR DOCD: CPT | Performed by: INTERNAL MEDICINE

## 2022-11-15 NOTE — LETTER
[address]  [address] [address]  Phone: [phone]  Fax: [phone]    Verónica Dove MD    November 15, 2022     Manolo Almanzar, [facility] [facility] [address] [place] [address]    Patient: Michelle Scott   MR Number: [id number]   YOB: 1940   Date of Visit: 11/15/2022       Dear Manolo Almanzar:    Thank you for referring Michael Ramos to me for evaluation/treatment. Below are the relevant portions of my assessment and plan of care. If you have questions, please do not hesitate to call me. I look forward to following Gabrielle Escamilla along with you.     Sincerely,      Verónica Dove MD

## 2022-11-15 NOTE — PATIENT INSTRUCTIONS
Plan:  1. Current medications reviewed. Refills given as warranted. 2. Call Dr. Hung Philip to get a refill on your tamsulosin. 3. Repeat blood work   -Digoxin, CBC, CMP, TSH, and fasting lipids (8 hours) ordered. 4. I recommend getting a Covid vaccine  5. Repeat bp 82/50  6. Stop bystolic and entresto due to low blood pressure  7. I may need to recheck a limited echo to check your heart strength after you are off of the medications.   8. Talk to Dr. Hung Philip about your muscle loss since you had rhabdo    Follow up with me in January 2023

## 2022-11-30 RX ORDER — TAMSULOSIN HYDROCHLORIDE 0.4 MG/1
0.4 CAPSULE ORAL DAILY
Qty: 90 CAPSULE | Refills: 3 | Status: SHIPPED | OUTPATIENT
Start: 2022-11-30

## 2022-11-30 RX ORDER — LIRAGLUTIDE 6 MG/ML
1.8 INJECTION SUBCUTANEOUS DAILY
Qty: 27 ML | Refills: 3 | Status: SHIPPED | OUTPATIENT
Start: 2022-11-30 | End: 2023-02-28

## 2022-12-08 DIAGNOSIS — Z79.899 MEDICATION MANAGEMENT: ICD-10-CM

## 2022-12-08 LAB
A/G RATIO: 1.7 (ref 1.1–2.2)
ALBUMIN SERPL-MCNC: 4.3 G/DL (ref 3.4–5)
ALP BLD-CCNC: 41 U/L (ref 40–129)
ALT SERPL-CCNC: 33 U/L (ref 10–40)
ANION GAP SERPL CALCULATED.3IONS-SCNC: 16 MMOL/L (ref 3–16)
AST SERPL-CCNC: 25 U/L (ref 15–37)
BASOPHILS ABSOLUTE: 0 K/UL (ref 0–0.2)
BASOPHILS RELATIVE PERCENT: 0.6 %
BILIRUB SERPL-MCNC: 0.6 MG/DL (ref 0–1)
BUN BLDV-MCNC: 15 MG/DL (ref 7–20)
CALCIUM SERPL-MCNC: 9.8 MG/DL (ref 8.3–10.6)
CHLORIDE BLD-SCNC: 103 MMOL/L (ref 99–110)
CHOLESTEROL, TOTAL: 144 MG/DL (ref 0–199)
CO2: 27 MMOL/L (ref 21–32)
CREAT SERPL-MCNC: 1.1 MG/DL (ref 0.8–1.3)
DIGOXIN LEVEL: 0.5 NG/ML (ref 0.8–2)
EOSINOPHILS ABSOLUTE: 0.2 K/UL (ref 0–0.6)
EOSINOPHILS RELATIVE PERCENT: 3 %
GFR SERPL CREATININE-BSD FRML MDRD: >60 ML/MIN/{1.73_M2}
GLUCOSE BLD-MCNC: 126 MG/DL (ref 70–99)
HCT VFR BLD CALC: 43.5 % (ref 40.5–52.5)
HDLC SERPL-MCNC: 36 MG/DL (ref 40–60)
HEMOGLOBIN: 14.5 G/DL (ref 13.5–17.5)
LDL CHOLESTEROL CALCULATED: 75 MG/DL
LYMPHOCYTES ABSOLUTE: 1.8 K/UL (ref 1–5.1)
LYMPHOCYTES RELATIVE PERCENT: 28.4 %
MCH RBC QN AUTO: 32.8 PG (ref 26–34)
MCHC RBC AUTO-ENTMCNC: 33.3 G/DL (ref 31–36)
MCV RBC AUTO: 98.5 FL (ref 80–100)
MONOCYTES ABSOLUTE: 0.5 K/UL (ref 0–1.3)
MONOCYTES RELATIVE PERCENT: 8.3 %
NEUTROPHILS ABSOLUTE: 3.7 K/UL (ref 1.7–7.7)
NEUTROPHILS RELATIVE PERCENT: 59.7 %
PDW BLD-RTO: 13.3 % (ref 12.4–15.4)
PLATELET # BLD: 207 K/UL (ref 135–450)
PMV BLD AUTO: 8.5 FL (ref 5–10.5)
POTASSIUM SERPL-SCNC: 3.9 MMOL/L (ref 3.5–5.1)
RBC # BLD: 4.41 M/UL (ref 4.2–5.9)
SODIUM BLD-SCNC: 146 MMOL/L (ref 136–145)
TOTAL PROTEIN: 6.8 G/DL (ref 6.4–8.2)
TRIGL SERPL-MCNC: 167 MG/DL (ref 0–150)
TSH REFLEX FT4: 3.45 UIU/ML (ref 0.27–4.2)
VLDLC SERPL CALC-MCNC: 33 MG/DL
WBC # BLD: 6.3 K/UL (ref 4–11)

## 2022-12-09 ENCOUNTER — TELEPHONE (OUTPATIENT)
Dept: CARDIOLOGY CLINIC | Age: 82
End: 2022-12-09

## 2022-12-09 NOTE — TELEPHONE ENCOUNTER
----- Message from Heather Cabral MD sent at 12/8/2022  5:49 PM EST -----  Excellent labs.  cpm Prenatal Vitamins

## 2022-12-09 NOTE — TELEPHONE ENCOUNTER
Called patient with Dr Morillo Page result message, No answer. Voice message left to call back office for result message.

## 2022-12-09 NOTE — TELEPHONE ENCOUNTER
Called patient with Dr Lawler Sites result message, No answer. Voice message left to call back office for result message.

## 2022-12-13 ENCOUNTER — OFFICE VISIT (OUTPATIENT)
Dept: INTERNAL MEDICINE CLINIC | Age: 82
End: 2022-12-13

## 2022-12-13 VITALS
HEIGHT: 72 IN | BODY MASS INDEX: 36.57 KG/M2 | SYSTOLIC BLOOD PRESSURE: 125 MMHG | RESPIRATION RATE: 18 BRPM | WEIGHT: 270 LBS | DIASTOLIC BLOOD PRESSURE: 68 MMHG | HEART RATE: 60 BPM

## 2022-12-13 DIAGNOSIS — E66.01 SEVERE OBESITY (BMI 35.0-39.9) WITH COMORBIDITY (HCC): ICD-10-CM

## 2022-12-13 DIAGNOSIS — I25.5 ISCHEMIC CARDIOMYOPATHY: Primary | ICD-10-CM

## 2022-12-13 DIAGNOSIS — C64.2 RENAL CANCER, LEFT (HCC): ICD-10-CM

## 2022-12-13 DIAGNOSIS — E11.42 TYPE 2 DIABETES MELLITUS WITH DIABETIC POLYNEUROPATHY, WITHOUT LONG-TERM CURRENT USE OF INSULIN (HCC): ICD-10-CM

## 2022-12-13 DIAGNOSIS — R53.83 FATIGUE, UNSPECIFIED TYPE: ICD-10-CM

## 2022-12-13 DIAGNOSIS — I25.10 CORONARY ARTERY DISEASE INVOLVING NATIVE CORONARY ARTERY OF NATIVE HEART WITHOUT ANGINA PECTORIS: ICD-10-CM

## 2022-12-13 DIAGNOSIS — I48.11 LONGSTANDING PERSISTENT ATRIAL FIBRILLATION (HCC): ICD-10-CM

## 2022-12-13 DIAGNOSIS — I10 ESSENTIAL HYPERTENSION: ICD-10-CM

## 2022-12-13 DIAGNOSIS — C64.2 RENAL CELL CARCINOMA, LEFT (HCC): ICD-10-CM

## 2022-12-13 PROCEDURE — 99213 OFFICE O/P EST LOW 20 MIN: CPT | Performed by: INTERNAL MEDICINE

## 2022-12-13 PROCEDURE — 3074F SYST BP LT 130 MM HG: CPT | Performed by: INTERNAL MEDICINE

## 2022-12-13 PROCEDURE — G8427 DOCREV CUR MEDS BY ELIG CLIN: HCPCS | Performed by: INTERNAL MEDICINE

## 2022-12-13 PROCEDURE — 1123F ACP DISCUSS/DSCN MKR DOCD: CPT | Performed by: INTERNAL MEDICINE

## 2022-12-13 PROCEDURE — 1036F TOBACCO NON-USER: CPT | Performed by: INTERNAL MEDICINE

## 2022-12-13 PROCEDURE — 3078F DIAST BP <80 MM HG: CPT | Performed by: INTERNAL MEDICINE

## 2022-12-13 PROCEDURE — G8484 FLU IMMUNIZE NO ADMIN: HCPCS | Performed by: INTERNAL MEDICINE

## 2022-12-13 PROCEDURE — 3044F HG A1C LEVEL LT 7.0%: CPT | Performed by: INTERNAL MEDICINE

## 2022-12-13 PROCEDURE — G8417 CALC BMI ABV UP PARAM F/U: HCPCS | Performed by: INTERNAL MEDICINE

## 2022-12-13 NOTE — PROGRESS NOTES
Tanya Anderson (:  1940) is a [de-identified] y.o. male,New patient, here for evaluation of the following chief complaint(s):  Follow-up        HPI   [de-identified] y.o. male with hx of CAD> HTN, DM, hyperlipidemia, Afibb, s.p pacer, renal cancer s.p resection  here for regular fw     Since last time pt has been fatigued and his BP was notd to be low per cardiology and taken off digoxin, entresto, bystolic   Home readings slowly improved now Bp improved from 80 systolic to 603       CAD sp multiple stents  -reports hx of MI in  leading to low EF   - his cardiac care was mainly in [place] and recently moved to [facility]  -  currently followed by Dr. Nicole Cervantes at [facility], no active symptoms. Compliant with meds    - unable to take statins with rhabdo and now on Praluent and zetia , improved lipids recently noted      Hx of Afibb with pacer in place, was off anticoagulation for severe rectal bleed from hemorrhoids needing 11 units transfusion.  Had watchman device place and is off AC now     Hx of IHD with CM , low EF of 40 %, improved to 55 % back  on entresto continued BB , no diuretics with no features of CHF  See above for changes     Hx of DM - only on victoza 1.8 mg with good control of sugars , sugars below 120 - not checking sugars as he cannot see      no low sugars per pt  Last A1c less than 5.8      Hx of renal cancer s.p partial left nephrectomy- recent ct with no recurrence    Has progressive loss of vision , f/w CEI, does not drive any more and wife helps him with IADL   Has hearing issues as well   No falls sicne last time     Reports polyuria    Independent of ADL   and lives with wife  No depression issues    Allergies   Allergen Reactions    Statins Other (See Comments)     rhabdomyolosis    Food      Cilantro causes soap taste in mouth         Current Outpatient Medications   Medication Sig Dispense Refill    tamsulosin (FLOMAX) 0.4 MG capsule Take 1 capsule by mouth daily 90 capsule 3    VICTOZA 18 MG/3ML SOPN SC injection Inject 1.8 mg into the skin daily Inject into the skin daily 27 mL 3    PRALUENT 75 MG/ML SOAJ injection pen INJECT ONE MILLILITER UNDER THE SKIN EVERY 14 DAYS 2 mL 5    ciprofloxacin (CILOXAN) 0.3 % ophthalmic solution 4 drops to affected ear twice a day x 7 days 1 each 0    fenofibrate (TRICOR) 48 MG tablet Take 1 tablet by mouth daily 90 tablet 3    Actiphyte of [place] LIQD by Does not apply route      zinc gluconate 50 MG tablet Take 50 mg by mouth daily      Selenious Acid (SELENIUM) 40 MCG/ML injection Take 50 mcg by mouth daily (Patient not taking: Reported on 11/15/2022)      ACCU-CHEK ELBERT PLUS strip       ezetimibe (ZETIA) 10 MG tablet Take 1 tablet by mouth daily 90 tablet 3    Insulin Pen Needle (BD PEN NEEDLE JM 2ND GEN) 32G X 4 MM MISC Use with Victoza daily. DX:E11.9 100 each 3    levothyroxine (SYNTHROID) 50 MCG tablet Take 1 tablet by mouth daily 90 tablet 3    b complex vitamins capsule Take 1 capsule by mouth daily      vitamin D 25 MCG (1000 UT) CAPS Take 1 capsule by mouth daily      Multiple Vitamins-Minerals (PRESERVISION AREDS 2 PO) Take 1 tablet by mouth daily      CALCIUM PO Take 1,200 mg by mouth daily        No current facility-administered medications for this visit.        Review of Systems      Constitutional: Negative for fever or chills + fatigue   HENT: Negative for sore throat   Eyes: Negative for redness   Respiratory: Negative  for dyspnea, cough   Cardiovascular: Negative for chest pain   Gastrointestinal:neg for abd pain, nausea or vomiting or diarrhea  No melena or BRPR  Genitourinary: Negative for hematuria   Musculoskeletal: Negative for arthralgias   Skin: Negative for rash   Neurological: Negative for syncope + hearing def + vision def   Hematological: Negative for adenopathy   Psychiatric/Behavorial: Negative for anxiety        Objective   Physical Exam      Vitals:    12/13/22 1530   BP: 125/68   Pulse: 60   Resp: 18           General:  Elderly male, appropriate appearing  Awake, alert and oriented. Appears to be not in any distress  Chronic vision issues with poor vision   Mucous Membranes:  Pink , anicteric  Hearing aids noted   Neck: No JVD, no carotid bruit, no thyromegaly  Chest:  Clear to auscultation bilaterally, no added sounds  Cardiovascular:  Irregular, left pacer   S1S2 heard, no murmurs or gallops  Abdomen:  Soft, undistended, obese  non tender, no organomegaly, BS present  Extremities: No edema or cyanosis. Distal pulses well felt  Skin rash - erythematous on beard line and behind ears noted   Neurological : grossly normal with poor vision and hearing issues  Memory intact  Gait steady      . STARLA 2019     1. Status post 27mm Watchman device implantation. There is a small (<3mm) mirlande-device   leak. 2. No pericardial effusion. 3. Mild Aortic Stenosis   4. Small residual mirlande-procedure ASD w/ predominant left to right shunt. Ct abd     Gastric distention with fluid. Diverticulosis without inflammatory change. Postsurgical changes suspected involving the lower anterior left kidney,   partial nephrectomy. Large duodenal diverticuli arising from the distal descending duodenum. ECHO 3/22       Summary   Technically difficult examination. Left ventricular systolic function is normal with ejection fraction   estimated at 55%. There is mild concentric left ventricular hypertrophy. Grade I diastolic dysfunction with normal left ventricular filling pressure. The right atrium is mildly dilated. Mild to moderate aortic stenosis. Wt Readings from Last 3 Encounters:   12/13/22 270 lb (122.5 kg)   11/15/22 266 lb (120.7 kg)   07/11/22 256 lb (116.1 kg)            Diagnosis Orders   1. Ischemic cardiomyopathy        2. Severe obesity (BMI 35.0-39. 9) with comorbidity ([address]        3. Fatigue, unspecified type        4.  Type 2 diabetes mellitus with diabetic polyneuropathy, without long-term current use of insulin ([address] 5. Essential hypertension        6. Longstanding persistent atrial fibrillation ([facility]        7. Renal cell carcinoma, left (HCC)        8. Coronary artery disease involving native coronary artery of native heart without angina pectoris        9. Renal cancer, left (HCC)                  HTN - stable on current meds - zebeta and norvasc, off  Entresto for low BP    DM - 2 with eye complications well controlled on victoza   Last A1c at 5.8    No low sugars- not checking sugars for vision issues  Change victoza to 1.2 mg daily    Had eye exam done  F.w CEI      CAD- s.p multiple stents  Hx of MI with ischemic heart disease and Cardiomyopathy with low EF of 40% , improved to 55 % now  - on ASA, off bisoprolol, entresto for low BP   - off statins for intolerance , now on fenofibrate, zetia, praluent, - lipids improved  - f.w Dr. Yoni Cantor  - recent renal panel stable , stable K levels   - repeat Echo limited to assess EF     Afibb, s.p Pacer , sp Watchman procedure for hx of severe GI bleed  - rate controlled off  BB and digoxin     Left renal cancer s.p partial nephrectomy - repeat f/w imaging neg so far  Renal panel stable    Hypothyroid - on synthroid, stable TSH     Obesity noted - weight loss and life style modification along with dietary changes, increased physical activity encouraged    Chronic vision and hearing issues limiting activity  Recommend wife to take him for a daily walks     Had vaccines   Had eye exam , had hearing exam  Has living will- POA is the wife    Had shingrix and covid , flu vaccines    F/w 6 months   An electronic signature was used to authenticate this note.     --Laneta Gosselin, MD

## 2023-01-05 ENCOUNTER — HOSPITAL ENCOUNTER (OUTPATIENT)
Dept: NON INVASIVE DIAGNOSTICS | Age: 83
Discharge: HOME | End: 2023-01-05
Payer: MEDICARE

## 2023-01-05 DIAGNOSIS — I25.5 ISCHEMIC CARDIOMYOPATHY: ICD-10-CM

## 2023-01-05 PROCEDURE — 93308 TTE F-UP OR LMTD: CPT

## 2023-01-09 ENCOUNTER — TELEPHONE (OUTPATIENT)
Dept: CARDIOLOGY CLINIC | Age: 83
End: 2023-01-09

## 2023-01-09 NOTE — TELEPHONE ENCOUNTER
SMM, please advise, thank you. Would you like pt to start Entresto back? Plan:  1. Current medications reviewed. Refills given as warranted. 2. Call Dr. Andi Taylor to get a refill on your tamsulosin. 3. Repeat blood work              -CBC, CMP, TSH, and fasting lipids (8 hours) ordered. 4. I recommend getting a Covid vaccine  5. Repeat bp 82/50  6. Stop bystolic and entresto due to low blood pressure  7. I may need to recheck a limited echo to check your heart strength after you are off of the medications. 8. Talk to Dr. Andi Taylor about your muscle loss since you had rhabdo.      Follow up with me in January 2023

## 2023-01-09 NOTE — TELEPHONE ENCOUNTER
Pt's Wife calling stating pt had ECHO done on 1.5.23 (in epic)  She also wants to know if pt is to Re-Entresto that was stoped on 11.15.22? States BP has been anywhere from 130's-140's over 70's.     Per wife pt also seen  on 12.13.22    Next ov with SMM is 12.9.23  Last ov with SMM was 11.15.23

## 2023-01-09 NOTE — TELEPHONE ENCOUNTER
OK. Stay on current med regimen. If BP < 140/90 consistently then cpm. If consistently elevated above this goal then call and I will adjust meds.

## 2023-01-09 NOTE — TELEPHONE ENCOUNTER
Wife called back and our med list is correct. For us-tricor, fenofibrate and praluent. He is NO longer taking Entresto as of today, digoxin and bystolic were stopped at office visit on 11/15/2022.

## 2023-01-30 RX ORDER — LEVOTHYROXINE SODIUM 50 MCG
TABLET ORAL
Qty: 90 TABLET | Refills: 0 | Status: SHIPPED | OUTPATIENT
Start: 2023-01-30

## 2023-01-31 ENCOUNTER — NURSE ONLY (OUTPATIENT)
Dept: CARDIOLOGY CLINIC | Age: 83
End: 2023-01-31
Payer: MEDICARE

## 2023-01-31 DIAGNOSIS — I42.8 NICM (NONISCHEMIC CARDIOMYOPATHY) (HCC): ICD-10-CM

## 2023-01-31 DIAGNOSIS — Z95.0 CARDIAC RESYNCHRONIZATION THERAPY PACEMAKER (CRT-P) IN PLACE: Primary | ICD-10-CM

## 2023-01-31 PROCEDURE — 93294 REM INTERROG EVL PM/LDLS PM: CPT | Performed by: INTERNAL MEDICINE

## 2023-01-31 PROCEDURE — 93296 REM INTERROG EVL PM/IDS: CPT | Performed by: INTERNAL MEDICINE

## 2023-02-06 NOTE — PROGRESS NOTES
[facility]   Cardiac Office Note    Referring Provider:  Neftali El MD     Chief Complaint   Patient presents with    Follow-up     2 month follow up    Coronary Artery Disease          Subjective: Patient is being seen today for cardiac follow up; c/o feeling baseline fatigue  History of Present Illness:  Mr. Derrick Erickson [de-identified] y.o. male with PMH CAD s/p 3 stents and 2 PTCA, hx MI 2007, hx rhabdo with statins (takes praluent and zetia now), PAF dx 2017 (NO AC), s/p Watchman device implanted by Dr Irene Nielson in [place], [place] due to massive GI bleed s/p 11 units PRBC's, hx ICM, renal cell CA s/p partial left nephrectomy, HTN, HLD, DM, LBBB, mild AS, mild JAIDEN (declined CPAP), symptomatic bradycardia s/p [employer] Bi-V  4/21. No ICD due to EF=40% not qualifying. Prior followed Dr Andriy Pressley. Nuc stress 4/14/21 normal. ECHO 6/19 EF=35-40%. MUGA 4/14/21  EF 40%. Diagnosed with Staargardt's eye disease. EKG 3/2/22 V-paced. ECHO 3/10/22 LV EF=55%; mild LVH; grade I DD normal filling pressure; mild AS (velocity=2.83m/s; MPG=16mmHg). Limited Echo 1/5/2023 EF=55-60%; mild LVH; aortic root is at 4.1 cm; aortic valve calcification with stenosis. Most recent device check 1/31/23 normal function; 80% time in afib; 7yrs DENISE. OV 11/15/22, we stopped bystolic and entresto due to low BP 55'D systolic. Today, he reports doing okay but feeling baseline fatigue. Patient with no complaints of chest pain, SOB, palpitations, dizziness, edema, or orthopnea/PND. Wife reports that his memory has gotten better since he has stopped bystolic and entresto. He monitors BP at home. BP log reviewed today and they are good numbers. He is compliant with medications. Note EF remained normal off BB and ARNI stopped in 11/22.      Weight today is 269# (Up 3# from 11/15/2022)    Past Medical History:   has a past medical history of Arthritis, CAD (coronary artery disease), Diabetes mellitus ([facility] [address] Hearing decreased, Hyperlipidemia, Hypertension, and Sneezing. Surgical History:   has a past surgical history that includes Cardiac surgery; eye surgery (cataracts both eyes); and cyst removal (2/10/2012). Social History: Retired brody , , recently moved to [place] [facility] from [facility]. They have 4 children combined  Former smoker quit age 57; drinks 2 drinks per day (rum and coke now) or wine  No drug use. Reports that he quit smoking age 53yo. He has never used smokeless tobacco. He reports current alcohol use. He reports that he does not use drugs. Family History:  family history is not significant for heart disease in parents     Home Medications:  Prior to Admission medications    Medication Sig Start Date End Date Taking?  Authorizing Provider   SYNTHROID 50 MCG tablet TAKE 1 TABLET DAILY 1/30/23  Yes Joelle Moralez MD   tamsulosin [facility]) 0.4 MG capsule Take 1 capsule by mouth daily 11/30/22  Yes Joelle Moralez MD   VICTOZA 18 MG/3ML SOPN SC injection Inject 1.8 mg into the skin daily Inject into the skin daily 11/30/22 2/28/23 Yes Joelle Moralez MD   PRALUENT 75 MG/ML SOAJ injection pen INJECT ONE MILLILITER UNDER THE SKIN EVERY 14 DAYS 10/7/22  Yes Joseph Leon MD   ciprofloxacin (CILOXAN) 0.3 % ophthalmic solution 4 drops to affected ear twice a day x 7 days 4/22/22  Yes Arminda Drop, DO   fenofibrate (TRICOR) 48 MG tablet Take 1 tablet by mouth daily 4/6/22  Yes ALBARO Fernandez - CNP   Actiphyte of [facility] LIQD by Does not apply route   Yes Historical Provider, MD   zinc gluconate 50 MG tablet Take 50 mg by mouth daily   Yes Historical Provider, MD   Selenious Acid (SELENIUM) 40 MCG/ML injection Take 50 mcg by mouth daily   Yes Historical Provider, MD   [address] strip  1/24/22  Yes Historical Provider, MD   ezetimibe (ZETIA) 10 MG tablet Take 1 tablet by mouth daily 1/26/22  Yes Lex Mejia MD   Insulin Pen Needle (BD PEN NEEDLE JM 2ND GEN) 32G X 4 MM MISC Use with Victoza daily. DX:E11.9 12/17/21  Yes Aristides Fernandez MD   b complex vitamins capsule Take 1 capsule by mouth daily   Yes Historical Provider, MD   vitamin D 25 MCG (1000 UT) CAPS Take 1 capsule by mouth daily   Yes Historical Provider, MD   Multiple Vitamins-Minerals (PRESERVISION AREDS 2 PO) Take 1 tablet by mouth daily   Yes Historical Provider, MD   CALCIUM PO Take 1,200 mg by mouth daily    Yes Historical Provider, MD     Allergies:  Statins: rhabdomyolysis    Cilantro: soap taste    Review of Systems:   Constitutional: there has been no unanticipated weight loss. There's been no change in energy level, sleep pattern, or activity level. Eyes: No visual changes or diplopia. No scleral icterus. ENT: No Headaches, hearing loss or vertigo. No mouth sores or sore throat. Cardiovascular: Reviewed in HPI  Respiratory: No cough or wheezing, no sputum production. No hematemesis. Gastrointestinal: No abdominal pain, appetite loss, blood in stools. No change in bowel or bladder habits. Genitourinary: No dysuria, trouble voiding, or hematuria. Musculoskeletal:  No gait disturbance, weakness or joint complaints. Integumentary: No rash or pruritis. Neurological: No headache, diplopia, change in muscle strength, numbness or tingling. No change in gait, balance, coordination, mood, affect, memory, mentation, behavior. Psychiatric: No anxiety, no depression. Endocrine: No malaise, fatigue or temperature intolerance. No excessive thirst, fluid intake, or urination. No tremor. Hematologic/Lymphatic: No abnormal bruising or bleeding, blood clots or swollen lymph nodes. Allergic/Immunologic: No nasal congestion or hives.     Physical Examination:  Height: 6' (1.829 m), Weight: 269 lb (122 kg), BP: (!) 147/85           Constitutional and General Appearance: NAD   Respiratory:  Normal excursion and expansion without use of accessory muscles  Resp Auscultation: Clear, no crackles or wheezes   Cardiovascular: The apical impulses not displaced  Heart tones are crisp and normal  Cervical veins are not engorged  The carotid upstroke is normal in amplitude and contour without delay or bruit  Normal S1S2, No S3, +soft BERE, Regular  Peripheral pulses are symmetrical and full  There is no clubbing, cyanosis of the extremities. No edema  Femoral Arteries: 2+ and equal  Pedal Pulses: 2+ and equal   Abdomen:  No masses or tenderness  Liver/Spleen: No Abnormalities Noted  Neurological/Psychiatric:  Alert and oriented in all spheres  Moves all extremities well  Exhibits normal gait balance and coordination  No abnormalities of mood, affect, memory, mentation, or behavior are noted  Skin:  Skin: warm and dry.     Lab Results   Component Value Date     (H) 12/08/2022    K 3.9 12/08/2022     12/08/2022    CO2 27 12/08/2022    BUN 15 12/08/2022    CREATININE 1.1 12/08/2022    GLUCOSE 126 (H) 12/08/2022    CALCIUM 9.8 12/08/2022    PROT 6.8 12/08/2022    LABALBU 4.3 12/08/2022    BILITOT 0.6 12/08/2022    ALKPHOS 41 12/08/2022    AST 25 12/08/2022    ALT 33 12/08/2022    LABGLOM >60 12/08/2022    GFRAA >60 07/11/2022    AGRATIO 1.7 12/08/2022    GLOB 3.1 08/27/2021     Lab Results   Component Value Date    WBC 6.3 12/08/2022    HGB 14.5 12/08/2022    HCT 43.5 12/08/2022    MCV 98.5 12/08/2022     12/08/2022     No results found for: TSH, G6CKXGT, M2LJHWO, THYROIDAB, FT3, T4FREE  Hemoglobin A1C   Date Value Ref Range Status   07/11/2022 5.9 See comment % Final     Comment:     Comment:  Diagnosis of Diabetes: > or = 6.5%  Increased risk of diabetes (Prediabetes): 5.7-6.4%  Glycemic Control: Nonpregnant Adults: <7.0%                    Pregnant: <6.0%         Lab Results   Component Value Date    CHOL 144 12/08/2022     Lab Results   Component Value Date    TRIG 167 (H) 12/08/2022     Lab Results   Component Value Date    HDL 36 (L) 12/08/2022    HDL 30 (L) 12/20/2021    HDL 30 (L) 09/09/2021 Lab Results   Component Value Date    LDLCALC 75 12/08/2022    LDLCALC 60 12/20/2021    LDLCALC 124 (H) 09/09/2021     Lab Results   Component Value Date    LABVLDL 33 12/08/2022    LABVLDL 53 12/20/2021    LABVLDL 39 09/09/2021     No results found for: CHOLHDLRATIO     I have personally reviewed all labs including lipids     Assessment:     1. Coronary artery disease involving native coronary artery of native heart without angina pectoris: Hx CAD s/p 3 stents and 2 PTCA and remote MI. Most recent nuclear stress 4/14/21 normal. There are no concerning symptoms for angina currently. 2. Essential hypertension: Overall adequate control reviewing home BP regimen. [place] /80 or less. Wife to follow and call if not at goal. I would restart bystolic 02XJ daily if BP not controlled adequately. See #5 below. 3. Mixed hyperlipidemia: I have personally reviewed all labs including lipids 12/8/22 in [facility] (see above). Results much improved overall. LDL close to goal 75. Note hx rhabdo with statins and takes zetia 10mg daily and Praluent. 4. Chronic fatigue: Likely mult-factorial. EF normal now and should not explain. See #5 below. 5.      Ischemic CM: Stable normal EF on most recent study Jan 2023 despite being off BB and ARNI. Improved from prior and no need to restart GDMT if EF normal off these meds. No evidence for CHF by exam or history. Plan:  1. Current medications reviewed. No refills warranted. 2. Goal for BP is below 130/80. Less than 140/90 is tolerable. May restart Bystolic 10 mg daily at that time but call office first.   3.  No need for cardiac or lab testing at this time. Plan to follow up in 6 months    This note was scribed in the presence of Paty Mancuso MD by Gunner Horton RN. I, Dr. Pearl Bliss, personally performed the services described in this documentation, as scribed by the above signed scribe in my presence.  It is both accurate and complete to my knowledge. I agree with the details independently gathered by the clinical support staff, while the remaining scribed note accurately describes my personal service to the patient. Cost of prescription medications and patient compliance have been reviewed with patient. All questions answered. Thank you for allowing me to participate in the care of this individual.     Ivanna Okeefe.  Erika Dawson M.D., [facility]

## 2023-02-08 NOTE — PROGRESS NOTES
End of 91-day monitoring period 1/31/23. AP 15%  RV/LVP 89-98% (RV pacing % decreased as higher heart rates noted while in AF). AT/AF burden 80%. Ongoing AF since 9/15/22, V rates . Remote transmission received for patients CRT-P. Transmission shows normal sensing and pacing function. EP physician will review. See interrogation under the cardiology tab in the [address] [place] [address] [address] field for more details. Will continue to monitor remotely. Hx ICM. NSVT,  AF/L -WATCHMAN 9/3/19. Echo 1/2023-LV EF 55-60%.

## 2023-02-09 ENCOUNTER — OFFICE VISIT (OUTPATIENT)
Dept: CARDIOLOGY CLINIC | Age: 83
End: 2023-02-09
Payer: MEDICARE

## 2023-02-09 VITALS
OXYGEN SATURATION: 97 % | BODY MASS INDEX: 36.44 KG/M2 | SYSTOLIC BLOOD PRESSURE: 147 MMHG | TEMPERATURE: 98.6 F | HEIGHT: 72 IN | WEIGHT: 269 LBS | HEART RATE: 77 BPM | DIASTOLIC BLOOD PRESSURE: 85 MMHG

## 2023-02-09 DIAGNOSIS — I25.10 CORONARY ARTERY DISEASE INVOLVING NATIVE CORONARY ARTERY OF NATIVE HEART WITHOUT ANGINA PECTORIS: Primary | ICD-10-CM

## 2023-02-09 DIAGNOSIS — F17.201 MILD TOBACCO ABUSE IN SUSTAINED REMISSION: ICD-10-CM

## 2023-02-09 DIAGNOSIS — I10 ESSENTIAL HYPERTENSION: ICD-10-CM

## 2023-02-09 DIAGNOSIS — E78.2 MIXED HYPERLIPIDEMIA: ICD-10-CM

## 2023-02-09 DIAGNOSIS — I48.11 LONGSTANDING PERSISTENT ATRIAL FIBRILLATION (HCC): ICD-10-CM

## 2023-02-09 DIAGNOSIS — E66.01 SEVERE OBESITY (BMI 35.0-39.9) WITH COMORBIDITY (HCC): ICD-10-CM

## 2023-02-09 PROCEDURE — G8427 DOCREV CUR MEDS BY ELIG CLIN: HCPCS | Performed by: INTERNAL MEDICINE

## 2023-02-09 PROCEDURE — 1036F TOBACCO NON-USER: CPT | Performed by: INTERNAL MEDICINE

## 2023-02-09 PROCEDURE — 1123F ACP DISCUSS/DSCN MKR DOCD: CPT | Performed by: INTERNAL MEDICINE

## 2023-02-09 PROCEDURE — 3077F SYST BP >= 140 MM HG: CPT | Performed by: INTERNAL MEDICINE

## 2023-02-09 PROCEDURE — 3079F DIAST BP 80-89 MM HG: CPT | Performed by: INTERNAL MEDICINE

## 2023-02-09 PROCEDURE — 99214 OFFICE O/P EST MOD 30 MIN: CPT | Performed by: INTERNAL MEDICINE

## 2023-02-09 PROCEDURE — G8484 FLU IMMUNIZE NO ADMIN: HCPCS | Performed by: INTERNAL MEDICINE

## 2023-02-09 PROCEDURE — G8417 CALC BMI ABV UP PARAM F/U: HCPCS | Performed by: INTERNAL MEDICINE

## 2023-02-09 NOTE — PATIENT INSTRUCTIONS
Plan:  1. Current medications reviewed. No refills warranted. 2. Goal for BP is below 130/80. Less than 140/90 is tolerable. May restart Bystolic 10 mg daily at that time but call office first.   3.  No need for cardiac or lab testing at this time.      Plan to follow up in 6 months

## 2023-04-17 RX ORDER — EZETIMIBE 10 MG/1
TABLET ORAL
Qty: 90 TABLET | Refills: 1 | Status: SHIPPED | OUTPATIENT
Start: 2023-04-17

## 2023-04-17 RX ORDER — ALIROCUMAB 75 MG/ML
INJECTION, SOLUTION SUBCUTANEOUS
Qty: 2 ML | Refills: 7 | Status: SHIPPED | OUTPATIENT
Start: 2023-04-17

## 2023-04-18 RX ORDER — FENOFIBRATE 48 MG/1
TABLET, COATED ORAL
Qty: 90 TABLET | Refills: 3 | Status: SHIPPED | OUTPATIENT
Start: 2023-04-18

## 2023-04-18 RX ORDER — LEVOTHYROXINE SODIUM 50 MCG
TABLET ORAL
Qty: 90 TABLET | Refills: 1 | Status: SHIPPED | OUTPATIENT
Start: 2023-04-18

## 2023-05-02 ENCOUNTER — NURSE ONLY (OUTPATIENT)
Dept: CARDIOLOGY CLINIC | Age: 83
End: 2023-05-02

## 2023-05-02 DIAGNOSIS — I49.5 SSS (SICK SINUS SYNDROME) (HCC): ICD-10-CM

## 2023-05-02 DIAGNOSIS — Z95.0 CARDIAC RESYNCHRONIZATION THERAPY PACEMAKER (CRT-P) IN PLACE: Primary | ICD-10-CM

## 2023-05-02 DIAGNOSIS — I42.8 NICM (NONISCHEMIC CARDIOMYOPATHY) (HCC): ICD-10-CM

## 2023-05-13 NOTE — PROGRESS NOTES
End of 91-day monitoring period 5/2  11% AP  76% RVP  92% LVP   (RV pacing % decreased as higher heart rates noted while in AF). Appears to be possible Ongoing AF since 9/15/22, V rates . Remote transmission received for patients CRT-P. Transmission shows normal sensing and pacing function. EP physician will review. See interrogation under the cardiology tab in the [address] [place] [address] [address] field for more details. Will continue to monitor remotely. Hx ICM. NSVT,  AF/L -WATCHMAN 9/3/19. Echo 1/2023-LV EF 55-60%.

## 2023-05-16 ENCOUNTER — TELEPHONE (OUTPATIENT)
Dept: CARDIOLOGY CLINIC | Age: 83
End: 2023-05-16

## 2023-05-16 RX ORDER — METOPROLOL SUCCINATE 25 MG/1
25 TABLET, EXTENDED RELEASE ORAL DAILY
Qty: 90 TABLET | Refills: 3 | Status: SHIPPED | OUTPATIENT
Start: 2023-05-16

## 2023-05-16 NOTE — TELEPHONE ENCOUNTER
----- Message from Belgica Ron MD sent at 5/15/2023  4:36 PM EDT -----  Reviewed. Needs more rate control. Please see if patient ok with starting metoprolol and run by Dr. Loni Pryor as patient hasn't been seen by EP.

## 2023-05-16 NOTE — TELEPHONE ENCOUNTER
MICHELE OOT-NPLR please review AGK message and advise. If agreeable to patient starting metoprolol, please clarify dosing/frequency. Then staff will contact patient to inform. Thanks.      LOV SMM 2/9/2023  NOELLE TINEO 3/2022

## 2023-05-16 NOTE — TELEPHONE ENCOUNTER
Fauzia Zarate MD  P [facility] Staff  During pacemaker check he is noted to have rapid heart rate. I have sent a prescription for metoprolol 25mg once a day to [facility]   Please call patient he will be getting that.

## 2023-05-16 NOTE — TELEPHONE ENCOUNTER
Spoke with pt spouse Beba Rowe, relayed [facility] message about medication. Pt spouse informed and will relay message to pt.

## 2023-06-19 ENCOUNTER — TELEPHONE (OUTPATIENT)
Dept: CARDIOLOGY CLINIC | Age: 83
End: 2023-06-19

## 2023-06-19 NOTE — TELEPHONE ENCOUNTER
----- Message from Andrew Whitman MD sent at 6/19/2023  8:00 AM EDT -----  Abdominal US negative for aortic aneurysm. Good news.  cpm

## 2023-06-29 ENCOUNTER — TELEPHONE (OUTPATIENT)
Dept: CARDIOLOGY CLINIC | Age: 83
End: 2023-06-29

## 2023-06-29 NOTE — TELEPHONE ENCOUNTER
Received fax from [employer] requesting prior authorization for Praluent. Scanned into media and routed to [address].

## 2023-06-30 ENCOUNTER — TELEPHONE (OUTPATIENT)
Dept: CARDIOLOGY CLINIC | Age: 83
End: 2023-06-30

## 2023-06-30 NOTE — TELEPHONE ENCOUNTER
Submitted PA for PRALUENT  [facility]ia [facility]  Key: [id number] STATUS: PENDING. Follow up done daily; if no response in three days we will refax for status check. If another three days goes by with no response we will call the insurance for status.

## 2023-07-17 ENCOUNTER — TELEPHONE (OUTPATIENT)
Dept: INTERNAL MEDICINE CLINIC | Age: 83
End: 2023-07-17

## 2023-07-17 RX ORDER — ALIROCUMAB 75 MG/ML
INJECTION, SOLUTION SUBCUTANEOUS
Qty: 2 ML | Refills: 7 | Status: SHIPPED | OUTPATIENT
Start: 2023-07-17

## 2023-07-17 NOTE — TELEPHONE ENCOUNTER
7/17 Pt's wife called and stated that she received a letter from the insurance and since the pt's PRALUENT 75 MG/ML SOAJ injection pen   Got changed to [address] [place] [address] that they can not fill it through mail order pharmacy.  Pt's wife stated for the new script for this medication should be sent to:    [address]  [phone]

## 2023-07-17 NOTE — TELEPHONE ENCOUNTER
----- Message from Hu Loera MD sent at 7/17/2023 12:54 PM EDT -----  Contact: Han Merritt [phone]  This medication does not cause those symptoms  Did he try imodium    ----- Message -----  From: Cordell Samson  Sent: 7/17/2023   9:29 AM EDT  To: Hu Loera MD    Spouse called regarding metoprolol succinate (TOPROL XL) 25 MG extended release tablet. Spouse states that over the past month pt had been experiencing diharrea and stomach issues but states this past weekend the symptoms had gotten worse. Spouse is wondering could it be the medication cause the issues and if so what advise you have.  Please advise

## 2023-07-17 NOTE — TELEPHONE ENCOUNTER
----- Message from Zev Zapata MD sent at 7/17/2023  2:30 PM EDT -----  Contact: Delgado Angeles [phone]  [facility]    ----- Message -----  From: Gagan Holley  Sent: 7/17/2023   1:07 PM EDT  To: Zev Zapata MD    Spouse informed, states pt has not tried imodium. Started taking tums 2 days ago which has been helping.  ----- Message -----  From: Zev Zapata MD  Sent: 7/17/2023  12:54 PM EDT  To: Gagan Holley    This medication does not cause those symptoms  Did he try imodium    ----- Message -----  From: Zia Padilla  Sent: 7/17/2023   9:29 AM EDT  To: Zev Zapata MD    Spouse called regarding metoprolol succinate (TOPROL XL) 25 MG extended release tablet. Spouse states that over the past month pt had been experiencing diharrea and stomach issues but states this past weekend the symptoms had gotten worse. Spouse is wondering could it be the medication cause the issues and if so what advise you have.  Please advise

## 2023-07-24 ENCOUNTER — TELEPHONE (OUTPATIENT)
Dept: CARDIOLOGY CLINIC | Age: 83
End: 2023-07-24

## 2023-07-24 RX ORDER — EVOLOCUMAB 140 MG/ML
140 INJECTION, SOLUTION SUBCUTANEOUS
Qty: 2.1 ML | Refills: 5 | Status: SHIPPED | OUTPATIENT
Start: 2023-07-24

## 2023-07-24 NOTE — TELEPHONE ENCOUNTER
Pharmacy [facility] pt's insurance will not cover [address] and want order for Repatha sent in.  Ty

## 2023-07-24 NOTE — TELEPHONE ENCOUNTER
Insurance will not cover praluent. Praluent removed from med list.  Neto Dhaliwal order placed and sent to [address] [place] [address].

## 2023-07-30 SDOH — HEALTH STABILITY: PHYSICAL HEALTH: ON AVERAGE, HOW MANY DAYS PER WEEK DO YOU ENGAGE IN MODERATE TO STRENUOUS EXERCISE (LIKE A BRISK WALK)?: 0 DAYS

## 2023-07-30 SDOH — HEALTH STABILITY: PHYSICAL HEALTH: ON AVERAGE, HOW MANY MINUTES DO YOU ENGAGE IN EXERCISE AT THIS LEVEL?: 0 MIN

## 2023-07-30 SDOH — ECONOMIC STABILITY: TRANSPORTATION INSECURITY
IN THE PAST 12 MONTHS, HAS LACK OF TRANSPORTATION KEPT YOU FROM MEETINGS, WORK, OR FROM GETTING THINGS NEEDED FOR DAILY LIVING?: NO

## 2023-07-30 SDOH — ECONOMIC STABILITY: FOOD INSECURITY: WITHIN THE PAST 12 MONTHS, YOU WORRIED THAT YOUR FOOD WOULD RUN OUT BEFORE YOU GOT MONEY TO BUY MORE.: NEVER TRUE

## 2023-07-30 SDOH — ECONOMIC STABILITY: INCOME INSECURITY: HOW HARD IS IT FOR YOU TO PAY FOR THE VERY BASICS LIKE FOOD, HOUSING, MEDICAL CARE, AND HEATING?: NOT VERY HARD

## 2023-07-30 SDOH — ECONOMIC STABILITY: HOUSING INSECURITY
IN THE LAST 12 MONTHS, WAS THERE A TIME WHEN YOU DID NOT HAVE A STEADY PLACE TO SLEEP OR SLEPT IN A SHELTER (INCLUDING NOW)?: NO

## 2023-07-30 SDOH — ECONOMIC STABILITY: FOOD INSECURITY: WITHIN THE PAST 12 MONTHS, THE FOOD YOU BOUGHT JUST DIDN'T LAST AND YOU DIDN'T HAVE MONEY TO GET MORE.: NEVER TRUE

## 2023-07-30 ASSESSMENT — LIFESTYLE VARIABLES
HOW OFTEN DO YOU HAVE A DRINK CONTAINING ALCOHOL: 4 OR MORE TIMES A WEEK
HOW OFTEN DURING THE LAST YEAR HAVE YOU HAD A FEELING OF GUILT OR REMORSE AFTER DRINKING: 0
HAVE YOU OR SOMEONE ELSE BEEN INJURED AS A RESULT OF YOUR DRINKING: NO
HOW OFTEN DURING THE LAST YEAR HAVE YOU FAILED TO DO WHAT WAS NORMALLY EXPECTED FROM YOU BECAUSE OF DRINKING: 0
HAS A RELATIVE, FRIEND, DOCTOR, OR ANOTHER HEALTH PROFESSIONAL EXPRESSED CONCERN ABOUT YOUR DRINKING OR SUGGESTED YOU CUT DOWN: 0
HAS A RELATIVE, FRIEND, DOCTOR, OR ANOTHER HEALTH PROFESSIONAL EXPRESSED CONCERN ABOUT YOUR DRINKING OR SUGGESTED YOU CUT DOWN: NO
HOW OFTEN DURING THE LAST YEAR HAVE YOU NEEDED AN ALCOHOLIC DRINK FIRST THING IN THE MORNING TO GET YOURSELF GOING AFTER A NIGHT OF HEAVY DRINKING: 0
HOW OFTEN DURING THE LAST YEAR HAVE YOU BEEN UNABLE TO REMEMBER WHAT HAPPENED THE NIGHT BEFORE BECAUSE YOU HAD BEEN DRINKING: 0
HOW OFTEN DURING THE LAST YEAR HAVE YOU BEEN UNABLE TO REMEMBER WHAT HAPPENED THE NIGHT BEFORE BECAUSE YOU HAD BEEN DRINKING: NEVER
HOW OFTEN DO YOU HAVE A DRINK CONTAINING ALCOHOL: 5
HOW OFTEN DURING THE LAST YEAR HAVE YOU FOUND THAT YOU WERE NOT ABLE TO STOP DRINKING ONCE YOU HAD STARTED: 0
HOW OFTEN DURING THE LAST YEAR HAVE YOU HAD A FEELING OF GUILT OR REMORSE AFTER DRINKING: NEVER
HOW MANY STANDARD DRINKS CONTAINING ALCOHOL DO YOU HAVE ON A TYPICAL DAY: 1
HOW OFTEN DURING THE LAST YEAR HAVE YOU NEEDED AN ALCOHOLIC DRINK FIRST THING IN THE MORNING TO GET YOURSELF GOING AFTER A NIGHT OF HEAVY DRINKING: NEVER
HOW OFTEN DURING THE LAST YEAR HAVE YOU FAILED TO DO WHAT WAS NORMALLY EXPECTED FROM YOU BECAUSE OF DRINKING: NEVER
HOW OFTEN DO YOU HAVE SIX OR MORE DRINKS ON ONE OCCASION: 1
HOW MANY STANDARD DRINKS CONTAINING ALCOHOL DO YOU HAVE ON A TYPICAL DAY: 1 OR 2
HOW OFTEN DURING THE LAST YEAR HAVE YOU FOUND THAT YOU WERE NOT ABLE TO STOP DRINKING ONCE YOU HAD STARTED: NEVER
HAVE YOU OR SOMEONE ELSE BEEN INJURED AS A RESULT OF YOUR DRINKING: 0

## 2023-07-30 ASSESSMENT — PATIENT HEALTH QUESTIONNAIRE - PHQ9
1. LITTLE INTEREST OR PLEASURE IN DOING THINGS: 1
SUM OF ALL RESPONSES TO PHQ QUESTIONS 1-9: 1
SUM OF ALL RESPONSES TO PHQ9 QUESTIONS 1 & 2: 1
2. FEELING DOWN, DEPRESSED OR HOPELESS: 0
SUM OF ALL RESPONSES TO PHQ QUESTIONS 1-9: 1

## 2023-07-31 ENCOUNTER — TELEPHONE (OUTPATIENT)
Dept: CARDIOLOGY CLINIC | Age: 83
End: 2023-07-31

## 2023-07-31 NOTE — TELEPHONE ENCOUNTER
It's the same thing but one is an injection like insulin and the other is prefilled syringe on patch that injects. Medication is the same.

## 2023-07-31 NOTE — TELEPHONE ENCOUNTER
MARTIN from [facility] specialty pharmacy called to see if we can switch the Repatha to the [address] Rafaela Valentine was approved by pts insurance.

## 2023-08-01 ENCOUNTER — NURSE ONLY (OUTPATIENT)
Dept: CARDIOLOGY CLINIC | Age: 83
End: 2023-08-01

## 2023-08-02 ENCOUNTER — OFFICE VISIT (OUTPATIENT)
Dept: INTERNAL MEDICINE CLINIC | Age: 83
End: 2023-08-02

## 2023-08-02 VITALS
HEIGHT: 72 IN | BODY MASS INDEX: 36.44 KG/M2 | SYSTOLIC BLOOD PRESSURE: 120 MMHG | DIASTOLIC BLOOD PRESSURE: 70 MMHG | WEIGHT: 269 LBS | RESPIRATION RATE: 18 BRPM | HEART RATE: 65 BPM

## 2023-08-02 DIAGNOSIS — I25.10 CORONARY ARTERY DISEASE INVOLVING NATIVE CORONARY ARTERY OF NATIVE HEART WITHOUT ANGINA PECTORIS: ICD-10-CM

## 2023-08-02 DIAGNOSIS — H54.8 LEGAL BLINDNESS: ICD-10-CM

## 2023-08-02 DIAGNOSIS — I50.22 CHRONIC SYSTOLIC CHF (CONGESTIVE HEART FAILURE) (HCC): ICD-10-CM

## 2023-08-02 DIAGNOSIS — E11.42 TYPE 2 DIABETES MELLITUS WITH DIABETIC POLYNEUROPATHY, WITHOUT LONG-TERM CURRENT USE OF INSULIN (HCC): ICD-10-CM

## 2023-08-02 DIAGNOSIS — I25.5 ISCHEMIC CARDIOMYOPATHY: ICD-10-CM

## 2023-08-02 DIAGNOSIS — I48.11 LONGSTANDING PERSISTENT ATRIAL FIBRILLATION (HCC): ICD-10-CM

## 2023-08-02 DIAGNOSIS — C64.2 RENAL CELL CARCINOMA, LEFT (HCC): ICD-10-CM

## 2023-08-02 DIAGNOSIS — Z00.00 MEDICARE ANNUAL WELLNESS VISIT, SUBSEQUENT: Primary | ICD-10-CM

## 2023-08-02 DIAGNOSIS — I10 ESSENTIAL HYPERTENSION: ICD-10-CM

## 2023-08-02 PROCEDURE — G0439 PPPS, SUBSEQ VISIT: HCPCS | Performed by: INTERNAL MEDICINE

## 2023-08-02 PROCEDURE — 1123F ACP DISCUSS/DSCN MKR DOCD: CPT | Performed by: INTERNAL MEDICINE

## 2023-08-02 PROCEDURE — 3078F DIAST BP <80 MM HG: CPT | Performed by: INTERNAL MEDICINE

## 2023-08-02 PROCEDURE — 3074F SYST BP LT 130 MM HG: CPT | Performed by: INTERNAL MEDICINE

## 2023-08-02 RX ORDER — NYSTATIN 100000 [USP'U]/G
POWDER TOPICAL
Qty: 45 G | Refills: 0 | Status: SHIPPED | OUTPATIENT
Start: 2023-08-02

## 2023-08-02 RX ORDER — NYSTATIN 100000 [USP'U]/G
POWDER TOPICAL
Qty: 45 G | Refills: 0 | Status: SHIPPED | OUTPATIENT
Start: 2023-08-02 | End: 2023-08-02 | Stop reason: SDUPTHER

## 2023-08-02 ASSESSMENT — PATIENT HEALTH QUESTIONNAIRE - PHQ9
SUM OF ALL RESPONSES TO PHQ QUESTIONS 1-9: 0
2. FEELING DOWN, DEPRESSED OR HOPELESS: 0
SUM OF ALL RESPONSES TO PHQ QUESTIONS 1-9: 0
1. LITTLE INTEREST OR PLEASURE IN DOING THINGS: 0
SUM OF ALL RESPONSES TO PHQ QUESTIONS 1-9: 0
SUM OF ALL RESPONSES TO PHQ9 QUESTIONS 1 & 2: 0
SUM OF ALL RESPONSES TO PHQ QUESTIONS 1-9: 0

## 2023-08-07 NOTE — PROGRESS NOTES
([address]), Hearing decreased, Hyperlipidemia, Hypertension, and Sneezing. Surgical History:   has a past surgical history that includes Cardiac surgery; eye surgery (cataracts both eyes); and cyst removal (2/10/2012). Social History: Retired brody , , [place], [place]. They have 4 children combined  Former smoker quit age 57; drinks 2 drinks per day (rum and coke now) or wine  No drug use. Reports that he quit smoking age 55yo. He has never used smokeless tobacco. He reports current alcohol use. He reports that he does not use drugs. Family History:  family history is not significant for heart disease in parents     Home Medications:  Prior to Admission medications    Medication Sig Start Date End Date Taking? Authorizing Provider   nystatin (MYCOSTATIN) 935558 UNIT/GM powder Apply 3 times daily.  8/2/23  Yes Hu Loera MD   Evolocumab 140 MG/ML SOAJ Inject 140 mg into the skin every 14 days 7/31/23  Yes Roel Fischer MD   metoprolol succinate (TOPROL XL) 25 MG extended release tablet Take 1 tablet by mouth daily 5/16/23  Yes Esau Wallace MD   SYNTHROID 50 MCG tablet TAKE 1 TABLET DAILY 4/18/23  Yes Hu Loera MD   ezetimibe (ZETIA) 10 MG tablet TAKE 1 TABLET DAILY 4/17/23  Yes Roel Fischer MD   tamsulosin [facility]) 0.4 MG capsule Take 1 capsule by mouth daily 11/30/22  Yes Hu Loera MD   zinc gluconate 50 MG tablet Take 1 tablet by mouth daily   Yes Historical Provider, MD   b complex vitamins capsule Take 1 capsule by mouth daily   Yes Historical Provider, MD   vitamin D 25 MCG (1000 UT) CAPS Take 1 capsule by mouth daily   Yes Historical Provider, MD   Multiple Vitamins-Minerals (PRESERVISION AREDS 2 PO) Take 1 tablet by mouth daily   Yes Historical Provider, MD   CALCIUM PO Take 1,200 mg by mouth daily    Yes Historical Provider, MD   VICTOZA 18 MG/3ML SOPN SC injection Inject 1.8 mg into the skin daily

## 2023-08-09 ENCOUNTER — TELEPHONE (OUTPATIENT)
Dept: CARDIOLOGY CLINIC | Age: 83
End: 2023-08-09

## 2023-08-09 ENCOUNTER — OFFICE VISIT (OUTPATIENT)
Dept: CARDIOLOGY CLINIC | Age: 83
End: 2023-08-09
Payer: MEDICARE

## 2023-08-09 VITALS
HEART RATE: 72 BPM | WEIGHT: 265 LBS | HEIGHT: 72 IN | DIASTOLIC BLOOD PRESSURE: 66 MMHG | OXYGEN SATURATION: 94 % | SYSTOLIC BLOOD PRESSURE: 114 MMHG | BODY MASS INDEX: 35.89 KG/M2

## 2023-08-09 DIAGNOSIS — E11.42 TYPE 2 DIABETES MELLITUS WITH DIABETIC POLYNEUROPATHY, WITHOUT LONG-TERM CURRENT USE OF INSULIN (HCC): ICD-10-CM

## 2023-08-09 DIAGNOSIS — I25.10 CORONARY ARTERY DISEASE INVOLVING NATIVE CORONARY ARTERY OF NATIVE HEART WITHOUT ANGINA PECTORIS: ICD-10-CM

## 2023-08-09 DIAGNOSIS — I48.11 LONGSTANDING PERSISTENT ATRIAL FIBRILLATION (HCC): ICD-10-CM

## 2023-08-09 DIAGNOSIS — Z00.00 MEDICARE ANNUAL WELLNESS VISIT, SUBSEQUENT: ICD-10-CM

## 2023-08-09 DIAGNOSIS — Z87.891 HISTORY OF TOBACCO ABUSE: ICD-10-CM

## 2023-08-09 DIAGNOSIS — I25.5 ISCHEMIC CARDIOMYOPATHY: ICD-10-CM

## 2023-08-09 DIAGNOSIS — I50.22 CHRONIC SYSTOLIC CHF (CONGESTIVE HEART FAILURE) (HCC): ICD-10-CM

## 2023-08-09 DIAGNOSIS — I10 ESSENTIAL HYPERTENSION: ICD-10-CM

## 2023-08-09 DIAGNOSIS — E78.2 MIXED HYPERLIPIDEMIA: ICD-10-CM

## 2023-08-09 DIAGNOSIS — R06.09 DOE (DYSPNEA ON EXERTION): ICD-10-CM

## 2023-08-09 DIAGNOSIS — I50.22 CHRONIC SYSTOLIC CHF (CONGESTIVE HEART FAILURE) (HCC): Primary | ICD-10-CM

## 2023-08-09 PROCEDURE — 3074F SYST BP LT 130 MM HG: CPT | Performed by: INTERNAL MEDICINE

## 2023-08-09 PROCEDURE — 1036F TOBACCO NON-USER: CPT | Performed by: INTERNAL MEDICINE

## 2023-08-09 PROCEDURE — 99214 OFFICE O/P EST MOD 30 MIN: CPT | Performed by: INTERNAL MEDICINE

## 2023-08-09 PROCEDURE — G8427 DOCREV CUR MEDS BY ELIG CLIN: HCPCS | Performed by: INTERNAL MEDICINE

## 2023-08-09 PROCEDURE — 3078F DIAST BP <80 MM HG: CPT | Performed by: INTERNAL MEDICINE

## 2023-08-09 PROCEDURE — G8417 CALC BMI ABV UP PARAM F/U: HCPCS | Performed by: INTERNAL MEDICINE

## 2023-08-09 PROCEDURE — 1123F ACP DISCUSS/DSCN MKR DOCD: CPT | Performed by: INTERNAL MEDICINE

## 2023-08-09 PROCEDURE — 93000 ELECTROCARDIOGRAM COMPLETE: CPT | Performed by: INTERNAL MEDICINE

## 2023-08-09 NOTE — PATIENT INSTRUCTIONS
Plan:  1. Current medications reviewed. No refills warranted. 2. Recommend starting baby aspirin daily (81 mg). Check with Adry Davis MD if this is okay. 3. Recommend seeing EP to see if device needs changes that could be causing fatigue  4. I will call pcp to see if need to be retested for sleep apnea.   5. Plan to follow up 6 months

## 2023-08-10 LAB
ALBUMIN SERPL-MCNC: 4.4 G/DL (ref 3.4–5)
ALBUMIN/GLOB SERPL: 1.7 {RATIO} (ref 1.1–2.2)
ALP SERPL-CCNC: 48 U/L (ref 40–129)
ALT SERPL-CCNC: 34 U/L (ref 10–40)
ANION GAP SERPL CALCULATED.3IONS-SCNC: 13 MMOL/L (ref 3–16)
AST SERPL-CCNC: 29 U/L (ref 15–37)
BASOPHILS # BLD: 0.1 K/UL (ref 0–0.2)
BASOPHILS NFR BLD: 1 %
BILIRUB SERPL-MCNC: 0.6 MG/DL (ref 0–1)
BUN SERPL-MCNC: 12 MG/DL (ref 7–20)
CALCIUM SERPL-MCNC: 9.3 MG/DL (ref 8.3–10.6)
CHLORIDE SERPL-SCNC: 102 MMOL/L (ref 99–110)
CHOLEST SERPL-MCNC: 133 MG/DL (ref 0–199)
CO2 SERPL-SCNC: 27 MMOL/L (ref 21–32)
CREAT SERPL-MCNC: 1.2 MG/DL (ref 0.8–1.3)
CREAT UR-MCNC: 362.5 MG/DL (ref 39–259)
DEPRECATED RDW RBC AUTO: 14 % (ref 12.4–15.4)
EOSINOPHIL # BLD: 0.2 K/UL (ref 0–0.6)
EOSINOPHIL NFR BLD: 2.9 %
EST. AVERAGE GLUCOSE BLD GHB EST-MCNC: 142.7 MG/DL
GFR SERPLBLD CREATININE-BSD FMLA CKD-EPI: >60 ML/MIN/{1.73_M2}
GLUCOSE SERPL-MCNC: 134 MG/DL (ref 70–99)
HBA1C MFR BLD: 6.6 %
HCT VFR BLD AUTO: 46.3 % (ref 40.5–52.5)
HDLC SERPL-MCNC: 33 MG/DL (ref 40–60)
HGB BLD-MCNC: 15.5 G/DL (ref 13.5–17.5)
LDL CHOLESTEROL CALCULATED: 63 MG/DL
LYMPHOCYTES # BLD: 2.2 K/UL (ref 1–5.1)
LYMPHOCYTES NFR BLD: 32.8 %
MCH RBC QN AUTO: 32.7 PG (ref 26–34)
MCHC RBC AUTO-ENTMCNC: 33.6 G/DL (ref 31–36)
MCV RBC AUTO: 97.5 FL (ref 80–100)
MICROALBUMIN UR DL<=1MG/L-MCNC: 8.5 MG/DL
MICROALBUMIN/CREAT UR: 23.4 MG/G (ref 0–30)
MONOCYTES # BLD: 0.5 K/UL (ref 0–1.3)
MONOCYTES NFR BLD: 8.2 %
NEUTROPHILS # BLD: 3.7 K/UL (ref 1.7–7.7)
NEUTROPHILS NFR BLD: 55.1 %
NT-PROBNP SERPL-MCNC: 1318 PG/ML (ref 0–449)
PLATELET # BLD AUTO: 217 K/UL (ref 135–450)
PMV BLD AUTO: 8.5 FL (ref 5–10.5)
POTASSIUM SERPL-SCNC: 4.2 MMOL/L (ref 3.5–5.1)
PROT SERPL-MCNC: 7 G/DL (ref 6.4–8.2)
PSA SERPL DL<=0.01 NG/ML-MCNC: 1.96 NG/ML (ref 0–4)
RBC # BLD AUTO: 4.74 M/UL (ref 4.2–5.9)
SODIUM SERPL-SCNC: 142 MMOL/L (ref 136–145)
TRIGL SERPL-MCNC: 186 MG/DL (ref 0–150)
TSH SERPL DL<=0.005 MIU/L-ACNC: 2.34 UIU/ML (ref 0.27–4.2)
URATE SERPL-MCNC: 6.4 MG/DL (ref 3.5–7.2)
VLDLC SERPL CALC-MCNC: 37 MG/DL
WBC # BLD AUTO: 6.7 K/UL (ref 4–11)

## 2023-08-15 NOTE — PROGRESS NOTES
See Elvira Lee report under cardiology tab once EP reviews. (RV pacing % decreased as higher heart rates noted while in AF). Appears to be possible Ongoing AF since 9/15/22, V rates   Hx ICM. NSVT (toprol xl),  AF/L -WATCHMAN 9/3/19. Echo 1/2023-LV EF 55-60%.

## 2023-08-28 ENCOUNTER — TELEPHONE (OUTPATIENT)
Dept: INTERNAL MEDICINE CLINIC | Age: 83
End: 2023-08-28

## 2023-08-28 RX ORDER — LOSARTAN POTASSIUM 25 MG/1
25 TABLET ORAL DAILY
Qty: 90 TABLET | Refills: 0 | Status: SHIPPED | OUTPATIENT
Start: 2023-08-28

## 2023-08-28 NOTE — TELEPHONE ENCOUNTER
----- Message from Lizbet Guadarrama MD sent at 8/28/2023  1:27 PM EDT -----  Contact: [phone]  Not sure why he has to stop  Can try again and see    ----- Message -----  From: Bianka Lewis  Sent: 8/28/2023  12:53 PM EDT  To: Lizbet Guadarrama MD    Pt and spouse are concerned about pt starting losartan again. States he was previously on it but stopped it and can't remember why, is not sure if he possibly had a reaction to it or if it was due to low BP. Wants to make sure you think he should start it. Please advise.

## 2023-08-28 NOTE — TELEPHONE ENCOUNTER
----- Message from Rolanda Bell MD sent at 8/10/2023  1:08 PM EDT -----  Sugars slightly higher than before with urine protien leak  Recommend to start watching sugars  Start on losartan 25 mg to help protein leak , this does not help sugars

## 2023-11-03 PROCEDURE — 93296 REM INTERROG EVL PM/IDS: CPT | Performed by: INTERNAL MEDICINE

## 2023-11-03 PROCEDURE — 93294 REM INTERROG EVL PM/LDLS PM: CPT | Performed by: INTERNAL MEDICINE

## 2023-11-09 RX ORDER — NYSTATIN 100000 [USP'U]/G
POWDER TOPICAL
Qty: 45 G | Refills: 0 | Status: SHIPPED | OUTPATIENT
Start: 2023-11-09

## 2023-11-20 RX ORDER — NYSTATIN 100000 [USP'U]/G
POWDER TOPICAL
Qty: 45 G | Refills: 0 | OUTPATIENT
Start: 2023-11-20

## 2023-11-20 RX ORDER — LEVOTHYROXINE SODIUM 50 MCG
TABLET ORAL
Qty: 90 TABLET | Refills: 1 | Status: SHIPPED | OUTPATIENT
Start: 2023-11-20

## 2023-11-28 ENCOUNTER — OFFICE VISIT (OUTPATIENT)
Dept: CARDIOLOGY CLINIC | Age: 83
End: 2023-11-28
Payer: MEDICARE

## 2023-11-28 ENCOUNTER — NURSE ONLY (OUTPATIENT)
Dept: CARDIOLOGY CLINIC | Age: 83
End: 2023-11-28
Payer: MEDICARE

## 2023-11-28 VITALS
HEART RATE: 71 BPM | SYSTOLIC BLOOD PRESSURE: 112 MMHG | WEIGHT: 274.8 LBS | BODY MASS INDEX: 37.22 KG/M2 | OXYGEN SATURATION: 95 % | DIASTOLIC BLOOD PRESSURE: 60 MMHG | HEIGHT: 72 IN

## 2023-11-28 DIAGNOSIS — I48.11 LONGSTANDING PERSISTENT ATRIAL FIBRILLATION (HCC): ICD-10-CM

## 2023-11-28 DIAGNOSIS — Z95.0 CARDIAC RESYNCHRONIZATION THERAPY PACEMAKER (CRT-P) IN PLACE: Primary | ICD-10-CM

## 2023-11-28 DIAGNOSIS — I48.11 LONGSTANDING PERSISTENT ATRIAL FIBRILLATION (HCC): Primary | ICD-10-CM

## 2023-11-28 DIAGNOSIS — Z95.0 CARDIAC RESYNCHRONIZATION THERAPY PACEMAKER (CRT-P) IN PLACE: ICD-10-CM

## 2023-11-28 PROCEDURE — G8427 DOCREV CUR MEDS BY ELIG CLIN: HCPCS | Performed by: INTERNAL MEDICINE

## 2023-11-28 PROCEDURE — G8417 CALC BMI ABV UP PARAM F/U: HCPCS | Performed by: INTERNAL MEDICINE

## 2023-11-28 PROCEDURE — 3078F DIAST BP <80 MM HG: CPT | Performed by: INTERNAL MEDICINE

## 2023-11-28 PROCEDURE — 99204 OFFICE O/P NEW MOD 45 MIN: CPT | Performed by: INTERNAL MEDICINE

## 2023-11-28 PROCEDURE — 3074F SYST BP LT 130 MM HG: CPT | Performed by: INTERNAL MEDICINE

## 2023-11-28 PROCEDURE — 93281 PM DEVICE PROGR EVAL MULTI: CPT | Performed by: INTERNAL MEDICINE

## 2023-11-28 PROCEDURE — G8484 FLU IMMUNIZE NO ADMIN: HCPCS | Performed by: INTERNAL MEDICINE

## 2023-11-28 RX ORDER — ASPIRIN 81 MG/1
81 TABLET ORAL DAILY
COMMUNITY

## 2023-11-28 NOTE — PROGRESS NOTES
[facility]   Electrophysiology Consult Note            Date: 11/28/23  Patient Name: Juanita Kerr  YOB: 1940    Primary Care Physician: Frankie Dee MD    CHIEF COMPLAINT:   Chief Complaint   Patient presents with    New Patient    Device Check    Atrial Fibrillation     HISTORY OF PRESENT ILLNESS: Juanita Kerr is a 82 y.o. male with a PMH significant for CAD, PAF s/p watchman device, HTN, symptomatic bradycardia s/p pacemaker, renal CA. He had OV with cardiology in 8/2023 and had complaints of fatigue. Today, 11/28/2023, Device interrogation demonstrated AP 7%,  66%, AF burden 85%, DENISE 6 years. He reports that he is doing ok. He is taking his medications as prescribed. Patient denies current edema, chest pain, sob, palpitations, dizziness or syncope. Past Medical History:   has a past medical history of Arthritis, CAD (coronary artery disease), Diabetes mellitus ([address]), Hearing decreased, Hyperlipidemia, Hypertension, and Sneezing. Past Surgical History:   has a past surgical history that includes Cardiac surgery; eye surgery (cataracts both eyes); and cyst removal (2/10/2012). Allergies:  Statins and Food    Social History:   reports that he quit smoking about 52 years ago. His smoking use included cigarettes. He has never used smokeless tobacco. He reports current alcohol use. He reports that he does not use drugs. Family History: family history is not on file. Home Medications:    Prior to Admission medications    Medication Sig Start Date End Date Taking?  Authorizing Provider   aspirin 81 MG EC tablet Take 1 tablet by mouth daily   Yes Provider, MD Nghia   SYNTHROID 50 MCG tablet TAKE 1 TABLET DAILY 11/20/23  Yes Frankie Dee MD   Evolocumab 140 MG/ML SOAJ Inject 140 mg into the skin every 14 days 7/31/23  Yes Bruna Valdez MD   metoprolol succinate (TOPROL XL) 25 MG extended release tablet Take 1 tablet by mouth daily 5/16/23  Yes

## 2023-11-28 NOTE — PATIENT INSTRUCTIONS
RECOMMENDATIONS:  Remote device checks every 3 months. Consider pursing normal rhythm with antiarrhythmic medications. Amiodarone (potential side effects), tikosyn (4 day hospital stay), flecainide, sotalol (4 day hospital stay), or dronedarone. Discussed risks and benefits of catheter ablation. Patient would like to proceed with Tikosyn. Follow up after admission.

## 2023-12-01 ENCOUNTER — TELEPHONE (OUTPATIENT)
Dept: CARDIOLOGY CLINIC | Age: 83
End: 2023-12-01

## 2023-12-01 NOTE — TELEPHONE ENCOUNTER
Pts wife called to see how they need to schedule the Tikosyn admission. Please advise. Per jose ov note:    Patient would like to proceed with Tikosyn.

## 2023-12-04 ENCOUNTER — HOSPITAL ENCOUNTER (INPATIENT)
Age: 83
LOS: 4 days | Discharge: HOME | End: 2023-12-08
Attending: INTERNAL MEDICINE | Admitting: INTERNAL MEDICINE
Payer: MEDICARE

## 2023-12-04 PROBLEM — I48.91 A-FIB (HCC): Status: ACTIVE | Noted: 2023-12-04

## 2023-12-04 PROBLEM — I48.91 ATRIAL FIBRILLATION (HCC): Status: ACTIVE | Noted: 2023-12-04

## 2023-12-04 LAB
ANION GAP SERPL CALCULATED.3IONS-SCNC: 10 MMOL/L (ref 3–16)
BASOPHILS # BLD: 0.1 K/UL (ref 0–0.2)
BASOPHILS NFR BLD: 0.7 %
BUN SERPL-MCNC: 10 MG/DL (ref 7–20)
CALCIUM SERPL-MCNC: 9 MG/DL (ref 8.3–10.6)
CHLORIDE SERPL-SCNC: 100 MMOL/L (ref 99–110)
CO2 SERPL-SCNC: 28 MMOL/L (ref 21–32)
CREAT SERPL-MCNC: 0.8 MG/DL (ref 0.8–1.3)
DEPRECATED RDW RBC AUTO: 13.4 % (ref 12.4–15.4)
EKG ATRIAL RATE: 83 BPM
EKG DIAGNOSIS: NORMAL
EKG Q-T INTERVAL: 412 MS
EKG QRS DURATION: 130 MS
EKG QTC CALCULATION (BAZETT): 475 MS
EKG R AXIS: -62 DEGREES
EKG T AXIS: 109 DEGREES
EKG VENTRICULAR RATE: 80 BPM
EOSINOPHIL # BLD: 0.2 K/UL (ref 0–0.6)
EOSINOPHIL NFR BLD: 2.7 %
GFR SERPLBLD CREATININE-BSD FMLA CKD-EPI: >60 ML/MIN/{1.73_M2}
GLUCOSE BLD-MCNC: 141 MG/DL (ref 70–99)
GLUCOSE BLD-MCNC: 157 MG/DL (ref 70–99)
GLUCOSE SERPL-MCNC: 106 MG/DL (ref 70–99)
HCT VFR BLD AUTO: 43.5 % (ref 40.5–52.5)
HGB BLD-MCNC: 15.2 G/DL (ref 13.5–17.5)
LYMPHOCYTES # BLD: 2.8 K/UL (ref 1–5.1)
LYMPHOCYTES NFR BLD: 30.5 %
MAGNESIUM SERPL-MCNC: 1.7 MG/DL (ref 1.8–2.4)
MCH RBC QN AUTO: 33.8 PG (ref 26–34)
MCHC RBC AUTO-ENTMCNC: 34.9 G/DL (ref 31–36)
MCV RBC AUTO: 97.1 FL (ref 80–100)
MONOCYTES # BLD: 0.8 K/UL (ref 0–1.3)
MONOCYTES NFR BLD: 9.3 %
NEUTROPHILS # BLD: 5.1 K/UL (ref 1.7–7.7)
NEUTROPHILS NFR BLD: 56.8 %
PERFORMED ON: ABNORMAL
PERFORMED ON: ABNORMAL
PLATELET # BLD AUTO: 193 K/UL (ref 135–450)
PMV BLD AUTO: 8.1 FL (ref 5–10.5)
POTASSIUM SERPL-SCNC: 3.8 MMOL/L (ref 3.5–5.1)
RBC # BLD AUTO: 4.48 M/UL (ref 4.2–5.9)
SODIUM SERPL-SCNC: 138 MMOL/L (ref 136–145)
WBC # BLD AUTO: 9 K/UL (ref 4–11)

## 2023-12-04 PROCEDURE — 99223 1ST HOSP IP/OBS HIGH 75: CPT | Performed by: INTERNAL MEDICINE

## 2023-12-04 PROCEDURE — 93010 ELECTROCARDIOGRAM REPORT: CPT | Performed by: INTERNAL MEDICINE

## 2023-12-04 PROCEDURE — 2060000000 HC ICU INTERMEDIATE R&B

## 2023-12-04 PROCEDURE — 6370000000 HC RX 637 (ALT 250 FOR IP): Performed by: INTERNAL MEDICINE

## 2023-12-04 PROCEDURE — 2580000003 HC RX 258

## 2023-12-04 PROCEDURE — 6370000000 HC RX 637 (ALT 250 FOR IP)

## 2023-12-04 PROCEDURE — 6360000002 HC RX W HCPCS

## 2023-12-04 PROCEDURE — 80048 BASIC METABOLIC PNL TOTAL CA: CPT

## 2023-12-04 PROCEDURE — 36415 COLL VENOUS BLD VENIPUNCTURE: CPT

## 2023-12-04 PROCEDURE — 83735 ASSAY OF MAGNESIUM: CPT

## 2023-12-04 PROCEDURE — 85025 COMPLETE CBC W/AUTO DIFF WBC: CPT

## 2023-12-04 PROCEDURE — 93005 ELECTROCARDIOGRAM TRACING: CPT

## 2023-12-04 PROCEDURE — 93005 ELECTROCARDIOGRAM TRACING: CPT | Performed by: INTERNAL MEDICINE

## 2023-12-04 RX ORDER — INSULIN LISPRO 100 [IU]/ML
0-8 INJECTION, SOLUTION INTRAVENOUS; SUBCUTANEOUS
Status: DISCONTINUED | OUTPATIENT
Start: 2023-12-04 | End: 2023-12-08 | Stop reason: HOSPADM

## 2023-12-04 RX ORDER — ZINC SULFATE 50(220)MG
50 CAPSULE ORAL DAILY
Status: DISCONTINUED | OUTPATIENT
Start: 2023-12-04 | End: 2023-12-08 | Stop reason: HOSPADM

## 2023-12-04 RX ORDER — ACETAMINOPHEN 650 MG/1
650 SUPPOSITORY RECTAL EVERY 6 HOURS PRN
Status: DISCONTINUED | OUTPATIENT
Start: 2023-12-04 | End: 2023-12-05

## 2023-12-04 RX ORDER — POLYETHYLENE GLYCOL 3350 17 G/17G
17 POWDER, FOR SOLUTION ORAL DAILY PRN
Status: DISCONTINUED | OUTPATIENT
Start: 2023-12-04 | End: 2023-12-08 | Stop reason: HOSPADM

## 2023-12-04 RX ORDER — INSULIN LISPRO 100 [IU]/ML
0-4 INJECTION, SOLUTION INTRAVENOUS; SUBCUTANEOUS NIGHTLY
Status: DISCONTINUED | OUTPATIENT
Start: 2023-12-04 | End: 2023-12-08 | Stop reason: HOSPADM

## 2023-12-04 RX ORDER — DOFETILIDE 0.25 MG/1
500 CAPSULE ORAL EVERY 12 HOURS SCHEDULED
Status: DISCONTINUED | OUTPATIENT
Start: 2023-12-04 | End: 2023-12-05

## 2023-12-04 RX ORDER — ACETAMINOPHEN 325 MG/1
650 TABLET ORAL EVERY 6 HOURS PRN
Status: DISCONTINUED | OUTPATIENT
Start: 2023-12-04 | End: 2023-12-05

## 2023-12-04 RX ORDER — DEXTROSE MONOHYDRATE 100 MG/ML
INJECTION, SOLUTION INTRAVENOUS CONTINUOUS PRN
Status: DISCONTINUED | OUTPATIENT
Start: 2023-12-04 | End: 2023-12-08 | Stop reason: HOSPADM

## 2023-12-04 RX ORDER — SODIUM CHLORIDE 9 MG/ML
INJECTION, SOLUTION INTRAVENOUS PRN
Status: DISCONTINUED | OUTPATIENT
Start: 2023-12-04 | End: 2023-12-08 | Stop reason: HOSPADM

## 2023-12-04 RX ORDER — LOSARTAN POTASSIUM 25 MG/1
25 TABLET ORAL DAILY
Status: DISCONTINUED | OUTPATIENT
Start: 2023-12-05 | End: 2023-12-08 | Stop reason: HOSPADM

## 2023-12-04 RX ORDER — EZETIMIBE 10 MG/1
10 TABLET ORAL DAILY
Status: DISCONTINUED | OUTPATIENT
Start: 2023-12-05 | End: 2023-12-08 | Stop reason: HOSPADM

## 2023-12-04 RX ORDER — TAMSULOSIN HYDROCHLORIDE 0.4 MG/1
0.4 CAPSULE ORAL DAILY
Status: DISCONTINUED | OUTPATIENT
Start: 2023-12-04 | End: 2023-12-08 | Stop reason: HOSPADM

## 2023-12-04 RX ORDER — ASPIRIN 81 MG/1
81 TABLET ORAL DAILY
Status: DISCONTINUED | OUTPATIENT
Start: 2023-12-04 | End: 2023-12-04

## 2023-12-04 RX ORDER — ASPIRIN 81 MG/1
81 TABLET ORAL DAILY
Status: DISCONTINUED | OUTPATIENT
Start: 2023-12-05 | End: 2023-12-08 | Stop reason: HOSPADM

## 2023-12-04 RX ORDER — INSULIN GLARGINE 100 [IU]/ML
0.15 INJECTION, SOLUTION SUBCUTANEOUS NIGHTLY
Status: DISCONTINUED | OUTPATIENT
Start: 2023-12-04 | End: 2023-12-08 | Stop reason: HOSPADM

## 2023-12-04 RX ORDER — SODIUM CHLORIDE 0.9 % (FLUSH) 0.9 %
5-40 SYRINGE (ML) INJECTION PRN
Status: DISCONTINUED | OUTPATIENT
Start: 2023-12-04 | End: 2023-12-08 | Stop reason: HOSPADM

## 2023-12-04 RX ORDER — VITAMIN B COMPLEX
1000 TABLET ORAL DAILY
Status: DISCONTINUED | OUTPATIENT
Start: 2023-12-04 | End: 2023-12-08 | Stop reason: HOSPADM

## 2023-12-04 RX ORDER — METOPROLOL SUCCINATE 25 MG/1
25 TABLET, EXTENDED RELEASE ORAL DAILY
Status: DISCONTINUED | OUTPATIENT
Start: 2023-12-05 | End: 2023-12-08 | Stop reason: HOSPADM

## 2023-12-04 RX ORDER — LEVOTHYROXINE SODIUM 0.05 MG/1
50 TABLET ORAL DAILY
Status: DISCONTINUED | OUTPATIENT
Start: 2023-12-05 | End: 2023-12-08 | Stop reason: HOSPADM

## 2023-12-04 RX ORDER — SODIUM CHLORIDE 0.9 % (FLUSH) 0.9 %
5-40 SYRINGE (ML) INJECTION EVERY 12 HOURS SCHEDULED
Status: DISCONTINUED | OUTPATIENT
Start: 2023-12-04 | End: 2023-12-08 | Stop reason: HOSPADM

## 2023-12-04 RX ORDER — MAGNESIUM SULFATE 1 G/100ML
1000 INJECTION INTRAVENOUS ONCE
Status: COMPLETED | OUTPATIENT
Start: 2023-12-04 | End: 2023-12-04

## 2023-12-04 RX ADMIN — Medication 1000 UNITS: at 16:39

## 2023-12-04 RX ADMIN — Medication 10 ML: at 20:48

## 2023-12-04 RX ADMIN — DOFETILIDE 500 MCG: 0.25 CAPSULE ORAL at 20:48

## 2023-12-04 RX ADMIN — MAGNESIUM SULFATE HEPTAHYDRATE 1000 MG: 1 INJECTION, SOLUTION INTRAVENOUS at 16:43

## 2023-12-04 RX ADMIN — TAMSULOSIN HYDROCHLORIDE 0.4 MG: 0.4 CAPSULE ORAL at 16:39

## 2023-12-04 RX ADMIN — Medication 50 MG: at 16:39

## 2023-12-04 ASSESSMENT — LIFESTYLE VARIABLES
HOW MANY STANDARD DRINKS CONTAINING ALCOHOL DO YOU HAVE ON A TYPICAL DAY: 1 OR 2
HOW OFTEN DO YOU HAVE A DRINK CONTAINING ALCOHOL: 2-3 TIMES A WEEK

## 2023-12-05 LAB
ANION GAP SERPL CALCULATED.3IONS-SCNC: 10 MMOL/L (ref 3–16)
BUN SERPL-MCNC: 11 MG/DL (ref 7–20)
CALCIUM SERPL-MCNC: 8.9 MG/DL (ref 8.3–10.6)
CHLORIDE SERPL-SCNC: 101 MMOL/L (ref 99–110)
CO2 SERPL-SCNC: 30 MMOL/L (ref 21–32)
CREAT SERPL-MCNC: 0.8 MG/DL (ref 0.8–1.3)
EKG ATRIAL RATE: 38 BPM
EKG ATRIAL RATE: 84 BPM
EKG DIAGNOSIS: NORMAL
EKG DIAGNOSIS: NORMAL
EKG Q-T INTERVAL: 420 MS
EKG Q-T INTERVAL: 468 MS
EKG QRS DURATION: 132 MS
EKG QRS DURATION: 162 MS
EKG QTC CALCULATION (BAZETT): 516 MS
EKG QTC CALCULATION (BAZETT): 536 MS
EKG R AXIS: -68 DEGREES
EKG R AXIS: 195 DEGREES
EKG T AXIS: 0 DEGREES
EKG T AXIS: 88 DEGREES
EKG VENTRICULAR RATE: 79 BPM
EKG VENTRICULAR RATE: 91 BPM
GFR SERPLBLD CREATININE-BSD FMLA CKD-EPI: >60 ML/MIN/{1.73_M2}
GLUCOSE BLD-MCNC: 127 MG/DL (ref 70–99)
GLUCOSE BLD-MCNC: 165 MG/DL (ref 70–99)
GLUCOSE BLD-MCNC: 173 MG/DL (ref 70–99)
GLUCOSE BLD-MCNC: 180 MG/DL (ref 70–99)
GLUCOSE SERPL-MCNC: 141 MG/DL (ref 70–99)
MAGNESIUM SERPL-MCNC: 1.8 MG/DL (ref 1.8–2.4)
PERFORMED ON: ABNORMAL
POTASSIUM SERPL-SCNC: 3.7 MMOL/L (ref 3.5–5.1)
SODIUM SERPL-SCNC: 141 MMOL/L (ref 136–145)

## 2023-12-05 PROCEDURE — 2060000000 HC ICU INTERMEDIATE R&B

## 2023-12-05 PROCEDURE — 93005 ELECTROCARDIOGRAM TRACING: CPT

## 2023-12-05 PROCEDURE — 36415 COLL VENOUS BLD VENIPUNCTURE: CPT

## 2023-12-05 PROCEDURE — 83735 ASSAY OF MAGNESIUM: CPT

## 2023-12-05 PROCEDURE — 80048 BASIC METABOLIC PNL TOTAL CA: CPT

## 2023-12-05 PROCEDURE — 93010 ELECTROCARDIOGRAM REPORT: CPT | Performed by: INTERNAL MEDICINE

## 2023-12-05 PROCEDURE — 6360000002 HC RX W HCPCS

## 2023-12-05 PROCEDURE — 99232 SBSQ HOSP IP/OBS MODERATE 35: CPT

## 2023-12-05 PROCEDURE — 6370000000 HC RX 637 (ALT 250 FOR IP)

## 2023-12-05 PROCEDURE — 6370000000 HC RX 637 (ALT 250 FOR IP): Performed by: INTERNAL MEDICINE

## 2023-12-05 PROCEDURE — 2580000003 HC RX 258

## 2023-12-05 RX ORDER — DOFETILIDE 0.25 MG/1
250 CAPSULE ORAL EVERY 12 HOURS SCHEDULED
Status: DISCONTINUED | OUTPATIENT
Start: 2023-12-05 | End: 2023-12-08 | Stop reason: HOSPADM

## 2023-12-05 RX ORDER — POTASSIUM CHLORIDE 20 MEQ/1
20 TABLET, EXTENDED RELEASE ORAL ONCE
Status: COMPLETED | OUTPATIENT
Start: 2023-12-05 | End: 2023-12-05

## 2023-12-05 RX ORDER — MAGNESIUM SULFATE 1 G/100ML
1000 INJECTION INTRAVENOUS ONCE
Status: COMPLETED | OUTPATIENT
Start: 2023-12-05 | End: 2023-12-05

## 2023-12-05 RX ADMIN — Medication 10 ML: at 21:07

## 2023-12-05 RX ADMIN — MAGNESIUM SULFATE HEPTAHYDRATE 1000 MG: 1 INJECTION, SOLUTION INTRAVENOUS at 15:43

## 2023-12-05 RX ADMIN — ACETAMINOPHEN, ASPIRIN, CAFFEINE 1 TABLET: 250; 65; 250 TABLET, FILM COATED ORAL at 09:20

## 2023-12-05 RX ADMIN — TAMSULOSIN HYDROCHLORIDE 0.4 MG: 0.4 CAPSULE ORAL at 09:20

## 2023-12-05 RX ADMIN — EZETIMIBE 10 MG: 10 TABLET ORAL at 09:20

## 2023-12-05 RX ADMIN — DOFETILIDE 250 MCG: 0.25 CAPSULE ORAL at 21:02

## 2023-12-05 RX ADMIN — LEVOTHYROXINE SODIUM 50 MCG: 0.05 TABLET ORAL at 09:20

## 2023-12-05 RX ADMIN — DOFETILIDE 250 MCG: 0.25 CAPSULE ORAL at 09:20

## 2023-12-05 RX ADMIN — POTASSIUM CHLORIDE 20 MEQ: 1500 TABLET, EXTENDED RELEASE ORAL at 15:43

## 2023-12-05 RX ADMIN — Medication 50 MG: at 09:20

## 2023-12-05 RX ADMIN — LOSARTAN POTASSIUM 25 MG: 25 TABLET, FILM COATED ORAL at 09:20

## 2023-12-05 RX ADMIN — Medication 10 ML: at 09:23

## 2023-12-05 RX ADMIN — METOPROLOL SUCCINATE 25 MG: 25 TABLET, EXTENDED RELEASE ORAL at 09:20

## 2023-12-05 RX ADMIN — Medication 1000 UNITS: at 09:20

## 2023-12-05 ASSESSMENT — PAIN DESCRIPTION - LOCATION: LOCATION: HEAD

## 2023-12-05 ASSESSMENT — PAIN SCALES - GENERAL: PAINLEVEL_OUTOF10: 5

## 2023-12-05 ASSESSMENT — PAIN DESCRIPTION - DESCRIPTORS: DESCRIPTORS: ACHING

## 2023-12-05 ASSESSMENT — PAIN DESCRIPTION - ORIENTATION: ORIENTATION: MID

## 2023-12-05 NOTE — CARE COORDINATION
Spoke with RN who states patient is here for Tikosyn dosing. She states he is from home with his wife and is independent at home and should have no needs from CM. Should discharge on Thursday with no needs. Patient has insurance and a PCP.

## 2023-12-05 NOTE — PLAN OF CARE
Problem: Safety - Adult  Goal: Free from fall injury  Outcome: Progressing   Standard safety precautions Implemented

## 2023-12-06 LAB
ANION GAP SERPL CALCULATED.3IONS-SCNC: 7 MMOL/L (ref 3–16)
BUN SERPL-MCNC: 11 MG/DL (ref 7–20)
CALCIUM SERPL-MCNC: 8.7 MG/DL (ref 8.3–10.6)
CHLORIDE SERPL-SCNC: 103 MMOL/L (ref 99–110)
CO2 SERPL-SCNC: 29 MMOL/L (ref 21–32)
CREAT SERPL-MCNC: 0.7 MG/DL (ref 0.8–1.3)
EKG ATRIAL RATE: 67 BPM
EKG ATRIAL RATE: 85 BPM
EKG DIAGNOSIS: NORMAL
EKG DIAGNOSIS: NORMAL
EKG P AXIS: 36 DEGREES
EKG P-R INTERVAL: 130 MS
EKG Q-T INTERVAL: 522 MS
EKG Q-T INTERVAL: 524 MS
EKG QRS DURATION: 162 MS
EKG QRS DURATION: 168 MS
EKG QTC CALCULATION (BAZETT): 553 MS
EKG QTC CALCULATION (BAZETT): 559 MS
EKG R AXIS: 137 DEGREES
EKG R AXIS: 196 DEGREES
EKG T AXIS: -2 DEGREES
EKG T AXIS: -4 DEGREES
EKG VENTRICULAR RATE: 67 BPM
EKG VENTRICULAR RATE: 69 BPM
GFR SERPLBLD CREATININE-BSD FMLA CKD-EPI: >60 ML/MIN/{1.73_M2}
GLUCOSE BLD-MCNC: 127 MG/DL (ref 70–99)
GLUCOSE BLD-MCNC: 128 MG/DL (ref 70–99)
GLUCOSE BLD-MCNC: 162 MG/DL (ref 70–99)
GLUCOSE BLD-MCNC: 178 MG/DL (ref 70–99)
GLUCOSE SERPL-MCNC: 132 MG/DL (ref 70–99)
MAGNESIUM SERPL-MCNC: 1.8 MG/DL (ref 1.8–2.4)
PERFORMED ON: ABNORMAL
POTASSIUM SERPL-SCNC: 4.1 MMOL/L (ref 3.5–5.1)
SODIUM SERPL-SCNC: 139 MMOL/L (ref 136–145)

## 2023-12-06 PROCEDURE — 2580000003 HC RX 258

## 2023-12-06 PROCEDURE — 36415 COLL VENOUS BLD VENIPUNCTURE: CPT

## 2023-12-06 PROCEDURE — 99232 SBSQ HOSP IP/OBS MODERATE 35: CPT

## 2023-12-06 PROCEDURE — 93010 ELECTROCARDIOGRAM REPORT: CPT | Performed by: INTERNAL MEDICINE

## 2023-12-06 PROCEDURE — 6370000000 HC RX 637 (ALT 250 FOR IP): Performed by: INTERNAL MEDICINE

## 2023-12-06 PROCEDURE — 2060000000 HC ICU INTERMEDIATE R&B

## 2023-12-06 PROCEDURE — 83735 ASSAY OF MAGNESIUM: CPT

## 2023-12-06 PROCEDURE — 80048 BASIC METABOLIC PNL TOTAL CA: CPT

## 2023-12-06 PROCEDURE — 93005 ELECTROCARDIOGRAM TRACING: CPT | Performed by: INTERNAL MEDICINE

## 2023-12-06 PROCEDURE — 6370000000 HC RX 637 (ALT 250 FOR IP)

## 2023-12-06 RX ADMIN — METOPROLOL SUCCINATE 25 MG: 25 TABLET, EXTENDED RELEASE ORAL at 09:03

## 2023-12-06 RX ADMIN — LOSARTAN POTASSIUM 25 MG: 25 TABLET, FILM COATED ORAL at 09:03

## 2023-12-06 RX ADMIN — ASPIRIN 81 MG: 81 TABLET, COATED ORAL at 09:04

## 2023-12-06 RX ADMIN — Medication 10 ML: at 09:09

## 2023-12-06 RX ADMIN — DOFETILIDE 250 MCG: 0.25 CAPSULE ORAL at 21:00

## 2023-12-06 RX ADMIN — Medication 50 MG: at 09:04

## 2023-12-06 RX ADMIN — Medication 10 ML: at 21:01

## 2023-12-06 RX ADMIN — TAMSULOSIN HYDROCHLORIDE 0.4 MG: 0.4 CAPSULE ORAL at 09:03

## 2023-12-06 RX ADMIN — LEVOTHYROXINE SODIUM 50 MCG: 0.05 TABLET ORAL at 09:03

## 2023-12-06 RX ADMIN — Medication 1000 UNITS: at 09:03

## 2023-12-06 RX ADMIN — EZETIMIBE 10 MG: 10 TABLET ORAL at 09:03

## 2023-12-06 RX ADMIN — DOFETILIDE 250 MCG: 0.25 CAPSULE ORAL at 09:03

## 2023-12-07 ENCOUNTER — PROCEDURE VISIT (OUTPATIENT)
Dept: CARDIOLOGY CLINIC | Age: 83
End: 2023-12-07

## 2023-12-07 ENCOUNTER — APPOINTMENT (OUTPATIENT)
Dept: GENERAL RADIOLOGY | Age: 83
End: 2023-12-07
Attending: INTERNAL MEDICINE
Payer: MEDICARE

## 2023-12-07 DIAGNOSIS — I48.11 LONGSTANDING PERSISTENT ATRIAL FIBRILLATION (HCC): ICD-10-CM

## 2023-12-07 DIAGNOSIS — Z95.0 CARDIAC RESYNCHRONIZATION THERAPY PACEMAKER (CRT-P) IN PLACE: Primary | ICD-10-CM

## 2023-12-07 LAB
ANION GAP SERPL CALCULATED.3IONS-SCNC: 9 MMOL/L (ref 3–16)
BUN SERPL-MCNC: 12 MG/DL (ref 7–20)
CALCIUM SERPL-MCNC: 9 MG/DL (ref 8.3–10.6)
CHLORIDE SERPL-SCNC: 101 MMOL/L (ref 99–110)
CO2 SERPL-SCNC: 31 MMOL/L (ref 21–32)
CREAT SERPL-MCNC: 0.8 MG/DL (ref 0.8–1.3)
EKG ATRIAL RATE: 67 BPM
EKG ATRIAL RATE: 72 BPM
EKG ATRIAL RATE: 72 BPM
EKG DIAGNOSIS: NORMAL
EKG P AXIS: 70 DEGREES
EKG P-R INTERVAL: 192 MS
EKG P-R INTERVAL: 384 MS
EKG Q-T INTERVAL: 510 MS
EKG Q-T INTERVAL: 514 MS
EKG Q-T INTERVAL: 516 MS
EKG QRS DURATION: 146 MS
EKG QRS DURATION: 164 MS
EKG QRS DURATION: 166 MS
EKG QTC CALCULATION (BAZETT): 543 MS
EKG QTC CALCULATION (BAZETT): 557 MS
EKG QTC CALCULATION (BAZETT): 558 MS
EKG R AXIS: -60 DEGREES
EKG R AXIS: 128 DEGREES
EKG R AXIS: 130 DEGREES
EKG T AXIS: -1 DEGREES
EKG T AXIS: 136 DEGREES
EKG T AXIS: 9 DEGREES
EKG VENTRICULAR RATE: 67 BPM
EKG VENTRICULAR RATE: 70 BPM
EKG VENTRICULAR RATE: 72 BPM
GFR SERPLBLD CREATININE-BSD FMLA CKD-EPI: >60 ML/MIN/{1.73_M2}
GLUCOSE BLD-MCNC: 128 MG/DL (ref 70–99)
GLUCOSE BLD-MCNC: 145 MG/DL (ref 70–99)
GLUCOSE BLD-MCNC: 151 MG/DL (ref 70–99)
GLUCOSE BLD-MCNC: 168 MG/DL (ref 70–99)
GLUCOSE SERPL-MCNC: 161 MG/DL (ref 70–99)
PERFORMED ON: ABNORMAL
POTASSIUM SERPL-SCNC: 3.7 MMOL/L (ref 3.5–5.1)
SODIUM SERPL-SCNC: 141 MMOL/L (ref 136–145)

## 2023-12-07 PROCEDURE — 93010 ELECTROCARDIOGRAM REPORT: CPT | Performed by: INTERNAL MEDICINE

## 2023-12-07 PROCEDURE — 99232 SBSQ HOSP IP/OBS MODERATE 35: CPT

## 2023-12-07 PROCEDURE — 93005 ELECTROCARDIOGRAM TRACING: CPT

## 2023-12-07 PROCEDURE — 6370000000 HC RX 637 (ALT 250 FOR IP): Performed by: INTERNAL MEDICINE

## 2023-12-07 PROCEDURE — 71046 X-RAY EXAM CHEST 2 VIEWS: CPT

## 2023-12-07 PROCEDURE — 2580000003 HC RX 258

## 2023-12-07 PROCEDURE — 36415 COLL VENOUS BLD VENIPUNCTURE: CPT

## 2023-12-07 PROCEDURE — 6370000000 HC RX 637 (ALT 250 FOR IP)

## 2023-12-07 PROCEDURE — 80048 BASIC METABOLIC PNL TOTAL CA: CPT

## 2023-12-07 PROCEDURE — 2060000000 HC ICU INTERMEDIATE R&B

## 2023-12-07 RX ADMIN — EZETIMIBE 10 MG: 10 TABLET ORAL at 08:36

## 2023-12-07 RX ADMIN — ASPIRIN 81 MG: 81 TABLET, COATED ORAL at 08:36

## 2023-12-07 RX ADMIN — TAMSULOSIN HYDROCHLORIDE 0.4 MG: 0.4 CAPSULE ORAL at 08:36

## 2023-12-07 RX ADMIN — Medication 10 ML: at 08:36

## 2023-12-07 RX ADMIN — Medication 1000 UNITS: at 08:36

## 2023-12-07 RX ADMIN — Medication 10 ML: at 21:09

## 2023-12-07 RX ADMIN — METOPROLOL SUCCINATE 25 MG: 25 TABLET, EXTENDED RELEASE ORAL at 08:36

## 2023-12-07 RX ADMIN — Medication 50 MG: at 08:36

## 2023-12-07 RX ADMIN — DOFETILIDE 250 MCG: 0.25 CAPSULE ORAL at 09:41

## 2023-12-07 RX ADMIN — LOSARTAN POTASSIUM 25 MG: 25 TABLET, FILM COATED ORAL at 08:36

## 2023-12-07 RX ADMIN — LEVOTHYROXINE SODIUM 50 MCG: 0.05 TABLET ORAL at 08:36

## 2023-12-07 RX ADMIN — DOFETILIDE 250 MCG: 0.25 CAPSULE ORAL at 21:09

## 2023-12-07 ASSESSMENT — PAIN SCALES - GENERAL
PAINLEVEL_OUTOF10: 0

## 2023-12-07 NOTE — PLAN OF CARE
Problem: Discharge Planning  Goal: Discharge to home or other facility with appropriate resources  Outcome: Progressing     Problem: Safety - Adult  Goal: Free from fall injury  Outcome: Progressing     Problem: Skin/Tissue Integrity  Goal: Absence of new skin breakdown  Description: 1. Monitor for areas of redness and/or skin breakdown  2. Assess vascular access sites hourly  3. Every 4-6 hours minimum:  Change oxygen saturation probe site  4. Every 4-6 hours:  If on nasal continuous positive airway pressure, respiratory therapy assess nares and determine need for appliance change or resting period.   Outcome: Progressing     Problem: Pain  Goal: Verbalizes/displays adequate comfort level or baseline comfort level  Outcome: Progressing     Problem: Chronic Conditions and Co-morbidities  Goal: Patient's chronic conditions and co-morbidity symptoms are monitored and maintained or improved  12/7/2023 0846 by Chary Michaud RN  Outcome: Progressing

## 2023-12-07 NOTE — PLAN OF CARE
Problem: Chronic Conditions and Co-morbidities  Goal: Patient's chronic conditions and co-morbidity symptoms are monitored and maintained or improved  12/7/2023 0231 by Pasha Cano RN  Outcome: Progressing

## 2023-12-08 VITALS
OXYGEN SATURATION: 92 % | BODY MASS INDEX: 36.2 KG/M2 | SYSTOLIC BLOOD PRESSURE: 113 MMHG | HEART RATE: 86 BPM | RESPIRATION RATE: 16 BRPM | WEIGHT: 267 LBS | TEMPERATURE: 98.1 F | DIASTOLIC BLOOD PRESSURE: 84 MMHG

## 2023-12-08 LAB
ANION GAP SERPL CALCULATED.3IONS-SCNC: 8 MMOL/L (ref 3–16)
BUN SERPL-MCNC: 11 MG/DL (ref 7–20)
CALCIUM SERPL-MCNC: 8.8 MG/DL (ref 8.3–10.6)
CHLORIDE SERPL-SCNC: 101 MMOL/L (ref 99–110)
CO2 SERPL-SCNC: 30 MMOL/L (ref 21–32)
CREAT SERPL-MCNC: 0.7 MG/DL (ref 0.8–1.3)
EKG ATRIAL RATE: 68 BPM
EKG DIAGNOSIS: NORMAL
EKG P AXIS: 73 DEGREES
EKG P-R INTERVAL: 192 MS
EKG Q-T INTERVAL: 494 MS
EKG QRS DURATION: 162 MS
EKG QTC CALCULATION (BAZETT): 526 MS
EKG R AXIS: 135 DEGREES
EKG T AXIS: 24 DEGREES
EKG VENTRICULAR RATE: 68 BPM
GFR SERPLBLD CREATININE-BSD FMLA CKD-EPI: >60 ML/MIN/{1.73_M2}
GLUCOSE BLD-MCNC: 117 MG/DL (ref 70–99)
GLUCOSE SERPL-MCNC: 131 MG/DL (ref 70–99)
PERFORMED ON: ABNORMAL
POTASSIUM SERPL-SCNC: 3.9 MMOL/L (ref 3.5–5.1)
SODIUM SERPL-SCNC: 139 MMOL/L (ref 136–145)

## 2023-12-08 PROCEDURE — 93010 ELECTROCARDIOGRAM REPORT: CPT | Performed by: INTERNAL MEDICINE

## 2023-12-08 PROCEDURE — 6370000000 HC RX 637 (ALT 250 FOR IP): Performed by: INTERNAL MEDICINE

## 2023-12-08 PROCEDURE — 6370000000 HC RX 637 (ALT 250 FOR IP)

## 2023-12-08 PROCEDURE — 2580000003 HC RX 258

## 2023-12-08 PROCEDURE — 36415 COLL VENOUS BLD VENIPUNCTURE: CPT

## 2023-12-08 PROCEDURE — 80048 BASIC METABOLIC PNL TOTAL CA: CPT

## 2023-12-08 PROCEDURE — 99239 HOSP IP/OBS DSCHRG MGMT >30: CPT

## 2023-12-08 RX ORDER — DOFETILIDE 0.25 MG/1
250 CAPSULE ORAL EVERY 12 HOURS SCHEDULED
Qty: 60 CAPSULE | Refills: 3 | Status: SHIPPED | OUTPATIENT
Start: 2023-12-08

## 2023-12-08 RX ADMIN — LOSARTAN POTASSIUM 25 MG: 25 TABLET, FILM COATED ORAL at 08:54

## 2023-12-08 RX ADMIN — DOFETILIDE 250 MCG: 0.25 CAPSULE ORAL at 09:24

## 2023-12-08 RX ADMIN — METOPROLOL SUCCINATE 25 MG: 25 TABLET, EXTENDED RELEASE ORAL at 08:54

## 2023-12-08 RX ADMIN — ASPIRIN 81 MG: 81 TABLET, COATED ORAL at 08:54

## 2023-12-08 RX ADMIN — Medication 1000 UNITS: at 08:54

## 2023-12-08 RX ADMIN — TAMSULOSIN HYDROCHLORIDE 0.4 MG: 0.4 CAPSULE ORAL at 08:54

## 2023-12-08 RX ADMIN — Medication 10 ML: at 08:54

## 2023-12-08 RX ADMIN — Medication 50 MG: at 08:54

## 2023-12-08 RX ADMIN — LEVOTHYROXINE SODIUM 50 MCG: 0.05 TABLET ORAL at 08:54

## 2023-12-08 RX ADMIN — EZETIMIBE 10 MG: 10 TABLET ORAL at 08:54

## 2023-12-08 ASSESSMENT — PAIN SCALES - GENERAL: PAINLEVEL_OUTOF10: 0

## 2023-12-08 NOTE — PROGRESS NOTES
PIV removed. Tip intact. Dressing in place. Tele box removed. [facility] notified of pt discharge. Discharge paperwork went over with patient and spouse and given to pt. Verbalizes understanding. Pt wheeled via wheelchair to private car with all belongings. Pt discharge home in stable condition. Leanne picked up at outpatient pharmacy. Dose verified with EP CNP.

## 2023-12-08 NOTE — DISCHARGE INSTR - COC
Continuity of Care Form    Patient Name: Edwar Aguilar   :  1940  MRN:  [id number]    Admit date:  2023  Discharge date:  ***    Code Status Order: Full Code   Advance Directives:     Admitting Physician:  Chanda Moore MD  PCP: Rolanda Bell MD    Discharging Nurse: [facility] Unit/Room#: 0217/0217-01  Discharging Unit Phone Number: ***    Emergency Contact:   Extended Emergency Contact Information  Primary Emergency Contact: Yann Kim  Address: [address], [address] [facility] [place] [address] [place] Phone: [phone]  Mobile Phone: [phone]  Relation: Spouse  Secondary Emergency Contact: HARRIETT NEGRETEWIN  Address: [address] [place] [address], [facility] [address] [facility] of [address] [address] Phone: [phone]  Mobile Phone: [phone]  Relation: Child    Past Surgical History:  Past Surgical History:   Procedure Laterality Date    CARDIAC SURGERY      stents  and angioplasty    CYST REMOVAL  2/10/2012    scalp    EYE SURGERY  cataracts both eyes       Immunization History:   Immunization History   Administered Date(s) Administered    COVID-19, MODERNA BLUE border, Primary or Immunocompromised, (age 12y+), IM, 100 mcg/0.5mL 2021, 2021, 2021    COVID-19, MODERNA Bivalent, (age 12y+), IM, 48 mcg/0.5 mL 2022    Hep A, HAVRIX, VAQTA, (age 19y+), IM, 1mL 1998    Hepatitis B 1998    Influenza Virus Vaccine 10/22/2022    Influenza, FLUZONE (age 72 y+), High Dose, 0.7mL 10/25/2021, 10/05/2022    Pneumococcal, PPSV23, PNEUMOVAX 21, (age 2y+), SC/IM, 0.5mL 2007    Td, unspecified formulation 1986, 1997       Active Problems:  Patient Active Problem List   Diagnosis Code    Coronary artery disease involving native coronary artery of native heart without angina pectoris I25.10    Essential hypertension I10    Mixed hyperlipidemia E78.2    History of tobacco abuse Z87.891    Cardiac resynchronization {[facility]:[id number]}

## 2023-12-11 ENCOUNTER — CARE COORDINATION (OUTPATIENT)
Dept: CASE MANAGEMENT | Age: 83
End: 2023-12-11

## 2023-12-11 ENCOUNTER — TELEPHONE (OUTPATIENT)
Dept: CARDIOLOGY CLINIC | Age: 83
End: 2023-12-11

## 2023-12-11 DIAGNOSIS — I48.91 ATRIAL FIBRILLATION, UNSPECIFIED TYPE (HCC): Primary | ICD-10-CM

## 2023-12-11 NOTE — TELEPHONE ENCOUNTER
Pts wife stated that she spoke with there insurance and they cannot get the refill of Tikosyn until we submit a PA for Tikosyn. Please advise.

## 2023-12-11 NOTE — CARE COORDINATION
Care Transitions Initial Follow Up Call    Call within 2 business days of discharge: Yes    Patient Current Location:  Home: [facility] [place]    Care Transition Nurse contacted the spouse/partner by telephone to perform post hospital discharge assessment. Verified name and  with spouse/partner as identifiers. Provided introduction to self, and explanation of the Care Transition Nurse role. Patient: Edilma Sarah Patient : 1940   MRN: [id number]  Reason for Admission: A-fib  Discharge Date: 23 RARS: Readmission Risk Score: 11.8      Last Discharge Facility       Date Complaint Diagnosis Description Type Department Provider    23   Admission (Discharged) Peggy Austin MD            Was this an external facility discharge? No Discharge Facility:     Challenges to be reviewed by the provider   Additional needs identified to be addressed with provider: Yes  F/u visit scheduled with cardio on 24, but TCM visit not seen in system. EC did not feel TCM visit was needed and declined assistance from CTN with scheduling. Please reach out to patient if TCM is needed. Method of communication with provider: chart routing. Patient's EC, Mike Espinosa answered call and verified . Pleasant and agreeable to transition call. Patient doing well and confirmed that they had AVS. Medications reviewed and Mike Espinosa having problem with medication refill. Stated she has reached out to provider and PA has been started for coverage. Mike Espinosa stated that she assist with filling medi-set for patient. Medication list reviewed and noted in system. Follow up visits reviewed and system and TCM visit was not noted in system. Mike Espinosa indicated that they were not instructed to f/u with PCP within the 7-14 day requirement. CTN routed message to provider's office to assist with scheduling if needed. CTN informed mariaelena that message was sent.    Denied any acute needs at present

## 2023-12-11 NOTE — TELEPHONE ENCOUNTER
Submitted PA for TIKOSYN  Via [facility] Key: [facility] STATUS: PENDING. Follow up done daily; if no response in three days we will refax for status check. If another three days goes by with no response we will call the insurance for status.

## 2023-12-12 RX ORDER — DOFETILIDE 0.25 MG/1
250 CAPSULE ORAL EVERY 12 HOURS SCHEDULED
Qty: 180 CAPSULE | Refills: 2 | Status: SHIPPED | OUTPATIENT
Start: 2023-12-12

## 2023-12-12 NOTE — TELEPHONE ENCOUNTER
Pts wife called with the pharmacy that the [facility] will need to go to, they will need to use [facility] specialty pharmacy.      Ph is [phone]  Fx is [phone]

## 2023-12-14 ENCOUNTER — CARE COORDINATION (OUTPATIENT)
Dept: CASE MANAGEMENT | Age: 83
End: 2023-12-14

## 2023-12-14 NOTE — CARE COORDINATION
Care Transitions Follow Up Call      Patient: Armando Morris  Patient : 1940   MRN: [id number]  Reason for Admission: A-fib  Discharge Date: 23 RARS: Readmission Risk Score: 11.8      Follow Up  Future Appointments   Date Time Provider [address]   2024  9:30 AM ALBARO Tai - IRINA [facility]   2024  2:00 PM MD Jamil Barbosa Int None     Attempted to reach patient via phone for transition call. VM left stating purpose of call along with my contact information requesting a return call.        Care Transitions Subsequent and Final Call    Subsequent and Final Calls  Care Transitions Interventions                          Other Interventions:           Atul Dior RN

## 2023-12-27 ENCOUNTER — CARE COORDINATION (OUTPATIENT)
Dept: CASE MANAGEMENT | Age: 83
End: 2023-12-27

## 2024-01-03 ENCOUNTER — CARE COORDINATION (OUTPATIENT)
Dept: CASE MANAGEMENT | Age: 84
End: 2024-01-03

## 2024-01-03 NOTE — CARE COORDINATION
medical action plan, and red flags with patient and spouse/partner and discussed any barriers to care and/or understanding of plan of care after discharge. Discussed appropriate site of care based on symptoms and resources available to patient including: PCP  Specialist  When to call 911. The patient and spouse/partner agrees to contact the PCP office for questions related to their healthcare.     Advance Care Planning:   not on file.     Patients top risk factors for readmission: functional physical ability  Interventions to address risk factors: Education of patient/family/caregiver/guardian to support self-management-     Offered patient enrollment in the Remote Patient Monitoring (RPM) program for in-home monitoring: Patient is not eligible for RPM program.     Care Transitions Subsequent and Final Call    Subsequent and Final Calls  Do you have any ongoing symptoms?: No  Have your medications changed?: No  Do you have any questions related to your medications?: No  Do you currently have any active services?: No  Do you have any needs or concerns that I can assist you with?: No  Identified Barriers: None  Care Transitions Interventions  Other Interventions:             Care Transition Nurse provided contact information for future needs. No further follow-up call indicated based on severity of symptoms and risk factors.  Gertrudis Yuan RN

## 2024-01-12 RX ORDER — LIRAGLUTIDE 6 MG/ML
INJECTION SUBCUTANEOUS
Qty: 27 ML | Refills: 3 | Status: SHIPPED | OUTPATIENT
Start: 2024-01-12

## 2024-01-18 NOTE — PROGRESS NOTES
[facility]   Electrophysiology Outpatient Note              Date:  January 19, 2024  Patient name: William Krishna  YOB: 1940    Primary Care physician: Petr Grijalva MD    HISTORY OF PRESENT ILLNESS: The patient is a 83 y.o.  male with a history of persistent atrial fibrillation, bradycardia, BiV pacemaker, CAD/MI with PCI x 3, ischemic cardiomyopathy with recovered EF, hypertension, renal cancer, GI bleed, hypothyroidism, diabetes, hyperlipidemia    Patient was admitted to [facility] 12/4/2023 with persistent atrial fibrillation and initiation of Tikosyn.     Today he is being seen for persistent atrial fibrillation and follow-up of Tikosyn initiation. EKG shows atrial fibrillation with a HR of 87.Device check revealed patient in atrial fibrillation at least since December 21. He was discharged from hospital on December 8. RV 48%,  98%  Patient complains of feeling tired which has been going on for the last 4 years.  Denies chest pain shortness of breath and palpitations.  Patient is with his wife. They used the [employer] mobile at home and realized they were in Afib.  Will discuss with Dr Pérez next week.  Patient agreeable for atrial fibrillation ablation if that is next best option    Past Medical History:   has a past medical history of Arthritis, CAD (coronary artery disease), Diabetes mellitus (HCC), Hearing decreased, Hyperlipidemia, Hypertension, and Sneezing.    Past Surgical History:   has a past surgical history that includes Cardiac surgery; eye surgery (cataracts both eyes); and cyst removal (2/10/2012).     Home Medications:    Prior to Admission medications    Medication Sig Start Date End Date Taking? Authorizing Provider   VICTOZA 18 MG/3ML SOPN SC injection INJECT 1.8MG SUBCUTANEOUSLYDAILY 1/12/24  Yes Petr Grijalva MD   dofetilide (TIKOSYN) 250 MCG capsule Take 1 capsule by mouth every 12 hours 12/12/23  Yes FIONA Préez Jr.,

## 2024-01-19 ENCOUNTER — NURSE ONLY (OUTPATIENT)
Dept: CARDIOLOGY CLINIC | Age: 84
End: 2024-01-19

## 2024-01-19 ENCOUNTER — OFFICE VISIT (OUTPATIENT)
Dept: CARDIOLOGY CLINIC | Age: 84
End: 2024-01-19
Payer: MEDICARE

## 2024-01-19 VITALS
WEIGHT: 267 LBS | HEART RATE: 70 BPM | DIASTOLIC BLOOD PRESSURE: 80 MMHG | HEIGHT: 72 IN | BODY MASS INDEX: 36.16 KG/M2 | SYSTOLIC BLOOD PRESSURE: 112 MMHG | OXYGEN SATURATION: 96 %

## 2024-01-19 DIAGNOSIS — E66.01 SEVERE OBESITY (BMI 35.0-39.9) WITH COMORBIDITY (HCC): ICD-10-CM

## 2024-01-19 DIAGNOSIS — I71.40 ABDOMINAL AORTIC ANEURYSM (AAA) WITHOUT RUPTURE, UNSPECIFIED PART (HCC): ICD-10-CM

## 2024-01-19 DIAGNOSIS — I49.5 SSS (SICK SINUS SYNDROME) (HCC): ICD-10-CM

## 2024-01-19 DIAGNOSIS — Z95.0 CARDIAC RESYNCHRONIZATION THERAPY PACEMAKER (CRT-P) IN PLACE: Primary | ICD-10-CM

## 2024-01-19 DIAGNOSIS — I48.19 PERSISTENT ATRIAL FIBRILLATION (HCC): Primary | ICD-10-CM

## 2024-01-19 DIAGNOSIS — I50.22 CHRONIC SYSTOLIC CHF (CONGESTIVE HEART FAILURE) (HCC): ICD-10-CM

## 2024-01-19 PROCEDURE — G8417 CALC BMI ABV UP PARAM F/U: HCPCS

## 2024-01-19 PROCEDURE — G8427 DOCREV CUR MEDS BY ELIG CLIN: HCPCS

## 2024-01-19 PROCEDURE — G8484 FLU IMMUNIZE NO ADMIN: HCPCS

## 2024-01-19 PROCEDURE — 1123F ACP DISCUSS/DSCN MKR DOCD: CPT

## 2024-01-19 PROCEDURE — 3074F SYST BP LT 130 MM HG: CPT

## 2024-01-19 PROCEDURE — 1036F TOBACCO NON-USER: CPT

## 2024-01-19 PROCEDURE — 3079F DIAST BP 80-89 MM HG: CPT

## 2024-01-19 PROCEDURE — 99214 OFFICE O/P EST MOD 30 MIN: CPT

## 2024-01-19 NOTE — PATIENT INSTRUCTIONS
1.  Continue Tikosyn 250 mcg twice daily  2.  Continue aspirin 81 mg daily and Zetia 10 mg daily  3.  Continue Cozaar 25 mg daily and Toprol-XL 25 mg daily

## 2024-01-22 ENCOUNTER — TELEPHONE (OUTPATIENT)
Dept: CARDIOLOGY CLINIC | Age: 84
End: 2024-01-22

## 2024-01-22 NOTE — TELEPHONE ENCOUNTER
Pt wife stated she didn`t know if pt should conitue or stop Tikosyn till appt with AGK on 2.6.2024. Please advise

## 2024-01-22 NOTE — TELEPHONE ENCOUNTER
I received a message from DENZEL stating she would prefer the pt to see AGK in the next 2weeks to discuss afib ablation. Please advise on date/time with agk for .

## 2024-01-22 NOTE — TELEPHONE ENCOUNTER
Plan:   1.  Continue Tikosyn 250 mcg twice daily  2.  Keep potassium 4.0 or greater and magnesium 2.0 or greater  3.  Continue aspirin 81 mg daily and Zetia 10 mg daily  4.  Continue Cozaar 25 mg daily and Toprol-XL 25 mg daily  5. Will speak with Dr Pérez next week regarding possible atrial fibrillation ablation  6. Discussed plan with patient and wife--both are agreeable       Spoke with pt spouse Milagro per HIPAA, informed her of NPKK last OV note on 1/19. Milagro gray

## 2024-01-29 ENCOUNTER — TELEPHONE (OUTPATIENT)
Dept: INTERNAL MEDICINE CLINIC | Age: 84
End: 2024-01-29

## 2024-01-29 DIAGNOSIS — I10 ESSENTIAL HYPERTENSION: ICD-10-CM

## 2024-01-29 DIAGNOSIS — Z00.00 ROUTINE GENERAL MEDICAL EXAMINATION AT A HEALTH CARE FACILITY: Primary | ICD-10-CM

## 2024-01-29 DIAGNOSIS — E11.42 TYPE 2 DIABETES MELLITUS WITH DIABETIC POLYNEUROPATHY, WITHOUT LONG-TERM CURRENT USE OF INSULIN (HCC): ICD-10-CM

## 2024-01-29 DIAGNOSIS — E78.2 MIXED HYPERLIPIDEMIA: ICD-10-CM

## 2024-01-29 PROCEDURE — 81002 URINALYSIS NONAUTO W/O SCOPE: CPT | Performed by: INTERNAL MEDICINE

## 2024-01-29 NOTE — TELEPHONE ENCOUNTER
----- Message from Petr Grijalva MD sent at 1/29/2024  3:34 PM EST -----  Contact: [phone]  Cbc cmp lipids, uric acid , ua  Tsh, testosterone free and total    ----- Message -----  From: Marcy Mann MA  Sent: 1/29/2024  10:34 AM EST  To: Petr Grijalva MD    Patient has an appointment on Friday, 2/2, and wants to know if he can come in Wednesday morning for his bloodwork since he will have to fast and his appointment is not until the afternoon. Also, his wife wants to have a Testosterone level added as well. She states that he use to be on testosterone a few years ago but was taken off it and has recently become very tired all the time. Please advise.       [facility] - [address] - P [phone] - F [phone]  [facility] [place] [address]  Phone: [phone]  Fax: [phone]

## 2024-01-30 ENCOUNTER — TELEPHONE (OUTPATIENT)
Dept: CARDIOLOGY | Age: 84
End: 2024-01-30

## 2024-01-31 DIAGNOSIS — E11.42 TYPE 2 DIABETES MELLITUS WITH DIABETIC POLYNEUROPATHY, WITHOUT LONG-TERM CURRENT USE OF INSULIN (HCC): ICD-10-CM

## 2024-01-31 DIAGNOSIS — I10 ESSENTIAL HYPERTENSION: ICD-10-CM

## 2024-01-31 DIAGNOSIS — Z00.00 ROUTINE GENERAL MEDICAL EXAMINATION AT A HEALTH CARE FACILITY: ICD-10-CM

## 2024-01-31 DIAGNOSIS — E78.2 MIXED HYPERLIPIDEMIA: ICD-10-CM

## 2024-01-31 LAB
ALBUMIN SERPL-MCNC: 4.4 G/DL (ref 3.4–5)
ALBUMIN/GLOB SERPL: 1.6 {RATIO} (ref 1.1–2.2)
ALP SERPL-CCNC: 53 U/L (ref 40–129)
ALT SERPL-CCNC: 40 U/L (ref 10–40)
ANION GAP SERPL CALCULATED.3IONS-SCNC: 14 MMOL/L (ref 3–16)
AST SERPL-CCNC: 30 U/L (ref 15–37)
BASOPHILS # BLD: 0.1 K/UL (ref 0–0.2)
BASOPHILS NFR BLD: 0.9 %
BILIRUB SERPL-MCNC: 0.9 MG/DL (ref 0–1)
BUN SERPL-MCNC: 12 MG/DL (ref 7–20)
CALCIUM SERPL-MCNC: 9 MG/DL (ref 8.3–10.6)
CHLORIDE SERPL-SCNC: 99 MMOL/L (ref 99–110)
CHOLEST SERPL-MCNC: 141 MG/DL (ref 0–199)
CO2 SERPL-SCNC: 29 MMOL/L (ref 21–32)
CREAT SERPL-MCNC: 0.9 MG/DL (ref 0.8–1.3)
DEPRECATED RDW RBC AUTO: 13.5 % (ref 12.4–15.4)
EOSINOPHIL # BLD: 0.3 K/UL (ref 0–0.6)
EOSINOPHIL NFR BLD: 4 %
GFR SERPLBLD CREATININE-BSD FMLA CKD-EPI: >60 ML/MIN/{1.73_M2}
GLUCOSE SERPL-MCNC: 133 MG/DL (ref 70–99)
HCT VFR BLD AUTO: 46.7 % (ref 40.5–52.5)
HDLC SERPL-MCNC: 32 MG/DL (ref 40–60)
HGB BLD-MCNC: 16.1 G/DL (ref 13.5–17.5)
LDLC SERPL CALC-MCNC: 57 MG/DL
LYMPHOCYTES # BLD: 2.3 K/UL (ref 1–5.1)
LYMPHOCYTES NFR BLD: 30.9 %
MCH RBC QN AUTO: 33.2 PG (ref 26–34)
MCHC RBC AUTO-ENTMCNC: 34.4 G/DL (ref 31–36)
MCV RBC AUTO: 96.4 FL (ref 80–100)
MONOCYTES # BLD: 0.6 K/UL (ref 0–1.3)
MONOCYTES NFR BLD: 7.8 %
NEUTROPHILS # BLD: 4.2 K/UL (ref 1.7–7.7)
NEUTROPHILS NFR BLD: 56.4 %
PLATELET # BLD AUTO: 216 K/UL (ref 135–450)
PMV BLD AUTO: 8.8 FL (ref 5–10.5)
POTASSIUM SERPL-SCNC: 3.9 MMOL/L (ref 3.5–5.1)
PROT SERPL-MCNC: 7.2 G/DL (ref 6.4–8.2)
RBC # BLD AUTO: 4.85 M/UL (ref 4.2–5.9)
SODIUM SERPL-SCNC: 142 MMOL/L (ref 136–145)
TRIGL SERPL-MCNC: 258 MG/DL (ref 0–150)
TSH SERPL DL<=0.005 MIU/L-ACNC: 2.62 UIU/ML (ref 0.27–4.2)
URATE SERPL-MCNC: 7.6 MG/DL (ref 3.5–7.2)
VLDLC SERPL CALC-MCNC: 52 MG/DL
WBC # BLD AUTO: 7.5 K/UL (ref 4–11)

## 2024-01-31 RX ORDER — TAMSULOSIN HYDROCHLORIDE 0.4 MG/1
0.4 CAPSULE ORAL DAILY
Qty: 90 CAPSULE | Refills: 0 | Status: SHIPPED | OUTPATIENT
Start: 2024-01-31

## 2024-01-31 NOTE — TELEPHONE ENCOUNTER
Ok, options are ablation, or starting amiodarone.  I'm happy to discuss in office as overbook.  Thanks.

## 2024-01-31 NOTE — TELEPHONE ENCOUNTER
Pts wife returned call. Message given per HIPAA. She stated that he has been extremely fatigued. On 1/30 he was at the chiropractor and laying on stomach and felt palpitations in back but that once he got up they went away and has not felt again. Denied sob, chest pain. Pt is seeing pcp on 2/2 and he is getting lab work done for them this week.

## 2024-02-02 ENCOUNTER — OFFICE VISIT (OUTPATIENT)
Dept: INTERNAL MEDICINE CLINIC | Age: 84
End: 2024-02-02

## 2024-02-02 VITALS
RESPIRATION RATE: 18 BRPM | HEART RATE: 65 BPM | BODY MASS INDEX: 36.3 KG/M2 | SYSTOLIC BLOOD PRESSURE: 130 MMHG | HEIGHT: 72 IN | WEIGHT: 268 LBS | DIASTOLIC BLOOD PRESSURE: 80 MMHG

## 2024-02-02 DIAGNOSIS — C64.2 RENAL CELL CARCINOMA, LEFT (HCC): ICD-10-CM

## 2024-02-02 DIAGNOSIS — E78.2 MIXED HYPERLIPIDEMIA: ICD-10-CM

## 2024-02-02 DIAGNOSIS — I50.22 CHRONIC SYSTOLIC CHF (CONGESTIVE HEART FAILURE) (HCC): ICD-10-CM

## 2024-02-02 DIAGNOSIS — I25.5 ISCHEMIC CARDIOMYOPATHY: ICD-10-CM

## 2024-02-02 DIAGNOSIS — E11.42 TYPE 2 DIABETES MELLITUS WITH DIABETIC POLYNEUROPATHY, WITHOUT LONG-TERM CURRENT USE OF INSULIN (HCC): ICD-10-CM

## 2024-02-02 DIAGNOSIS — I48.11 LONGSTANDING PERSISTENT ATRIAL FIBRILLATION (HCC): Primary | ICD-10-CM

## 2024-02-02 DIAGNOSIS — I10 ESSENTIAL HYPERTENSION: ICD-10-CM

## 2024-02-02 DIAGNOSIS — I25.10 CORONARY ARTERY DISEASE INVOLVING NATIVE CORONARY ARTERY OF NATIVE HEART WITHOUT ANGINA PECTORIS: ICD-10-CM

## 2024-02-02 DIAGNOSIS — R53.82 CHRONIC FATIGUE: ICD-10-CM

## 2024-02-02 DIAGNOSIS — H54.8 LEGAL BLINDNESS: ICD-10-CM

## 2024-02-02 LAB
SHBG SERPL-SCNC: 39 NMOL/L (ref 11–80)
TESTOST FREE SERPL-MCNC: 34.7 PG/ML (ref 47–244)
TESTOST SERPL-MCNC: 202 NG/DL (ref 220–1000)

## 2024-02-02 PROCEDURE — G8484 FLU IMMUNIZE NO ADMIN: HCPCS | Performed by: INTERNAL MEDICINE

## 2024-02-02 PROCEDURE — 1036F TOBACCO NON-USER: CPT | Performed by: INTERNAL MEDICINE

## 2024-02-02 PROCEDURE — 3079F DIAST BP 80-89 MM HG: CPT | Performed by: INTERNAL MEDICINE

## 2024-02-02 PROCEDURE — 3075F SYST BP GE 130 - 139MM HG: CPT | Performed by: INTERNAL MEDICINE

## 2024-02-02 PROCEDURE — G8427 DOCREV CUR MEDS BY ELIG CLIN: HCPCS | Performed by: INTERNAL MEDICINE

## 2024-02-02 PROCEDURE — 1123F ACP DISCUSS/DSCN MKR DOCD: CPT | Performed by: INTERNAL MEDICINE

## 2024-02-02 PROCEDURE — 99213 OFFICE O/P EST LOW 20 MIN: CPT | Performed by: INTERNAL MEDICINE

## 2024-02-02 PROCEDURE — G8417 CALC BMI ABV UP PARAM F/U: HCPCS | Performed by: INTERNAL MEDICINE

## 2024-02-02 NOTE — PROGRESS NOTES
dysuria symptoms     Independent of ADL but dependant on wife for IADL   and lives with wife  No depression issues      Allergies   Allergen Reactions    Statins Other (See Comments)     rhabdomyolosis    Food      Cilantro causes soap taste in mouth         Current Outpatient Medications   Medication Sig Dispense Refill    tamsulosin (FLOMAX) 0.4 MG capsule TAKE 1 CAPSULE DAILY 90 capsule 0    VICTOZA 18 MG/3ML SOPN SC injection INJECT 1.8MG SUBCUTANEOUSLYDAILY 27 mL 3    dofetilide (TIKOSYN) 250 MCG capsule Take 1 capsule by mouth every 12 hours 180 capsule 2    aspirin 81 MG EC tablet Take 1 tablet by mouth daily      SYNTHROID 50 MCG tablet TAKE 1 TABLET DAILY 90 tablet 1    losartan (COZAAR) 25 MG tablet Take 1 tablet by mouth daily 90 tablet 0    Evolocumab 140 MG/ML SOAJ Inject 140 mg into the skin every 14 days 6 Adjustable Dose Pre-filled Pen Syringe 3    metoprolol succinate (TOPROL XL) 25 MG extended release tablet Take 1 tablet by mouth daily 90 tablet 3    ezetimibe (ZETIA) 10 MG tablet TAKE 1 TABLET DAILY 90 tablet 1    zinc gluconate 50 MG tablet Take 1 tablet by mouth daily      ACCU-CHEK ELBERT PLUS strip       Insulin Pen Needle (BD PEN NEEDLE JM 2ND GEN) 32G X 4 MM MISC Use with Victoza daily.  DX:E11.9 100 each 3    b complex vitamins capsule Take 1 capsule by mouth daily      vitamin D 25 MCG (1000 UT) CAPS Take 1 capsule by mouth daily      Multiple Vitamins-Minerals (PRESERVISION AREDS 2 PO) Take 1 tablet by mouth daily      CALCIUM PO Take 1,200 mg by mouth daily        No current facility-administered medications for this visit.       Review of Systems      Constitutional: Negative for fever or chills + fatigue   HENT: Negative for sore throat   Eyes: Negative for redness   Respiratory: Negative  for dyspnea, cough   Cardiovascular: Negative for chest pain   Gastrointestinal:neg for abd pain, nausea or vomiting or diarrhea  No melena or BRPR  Genitourinary: Negative for hematuria

## 2024-02-03 LAB
EST. AVERAGE GLUCOSE BLD GHB EST-MCNC: 125.5 MG/DL
HBA1C MFR BLD: 6 %

## 2024-02-05 DIAGNOSIS — R79.89 LOW TESTOSTERONE: Primary | ICD-10-CM

## 2024-02-06 ENCOUNTER — NURSE ONLY (OUTPATIENT)
Dept: CARDIOLOGY CLINIC | Age: 84
End: 2024-02-06

## 2024-02-06 ENCOUNTER — OFFICE VISIT (OUTPATIENT)
Dept: CARDIOLOGY CLINIC | Age: 84
End: 2024-02-06
Payer: MEDICARE

## 2024-02-06 VITALS
BODY MASS INDEX: 36.19 KG/M2 | HEIGHT: 72 IN | WEIGHT: 267.2 LBS | HEART RATE: 71 BPM | OXYGEN SATURATION: 97 % | SYSTOLIC BLOOD PRESSURE: 112 MMHG | DIASTOLIC BLOOD PRESSURE: 60 MMHG

## 2024-02-06 DIAGNOSIS — Z95.0 CARDIAC RESYNCHRONIZATION THERAPY PACEMAKER (CRT-P) IN PLACE: ICD-10-CM

## 2024-02-06 DIAGNOSIS — I48.11 LONGSTANDING PERSISTENT ATRIAL FIBRILLATION (HCC): Primary | ICD-10-CM

## 2024-02-06 DIAGNOSIS — I49.5 SSS (SICK SINUS SYNDROME) (HCC): ICD-10-CM

## 2024-02-06 DIAGNOSIS — Z95.0 CARDIAC RESYNCHRONIZATION THERAPY PACEMAKER (CRT-P) IN PLACE: Primary | ICD-10-CM

## 2024-02-06 PROCEDURE — 93000 ELECTROCARDIOGRAM COMPLETE: CPT | Performed by: INTERNAL MEDICINE

## 2024-02-06 PROCEDURE — G8427 DOCREV CUR MEDS BY ELIG CLIN: HCPCS | Performed by: INTERNAL MEDICINE

## 2024-02-06 PROCEDURE — 3074F SYST BP LT 130 MM HG: CPT | Performed by: INTERNAL MEDICINE

## 2024-02-06 PROCEDURE — 1123F ACP DISCUSS/DSCN MKR DOCD: CPT | Performed by: INTERNAL MEDICINE

## 2024-02-06 PROCEDURE — G8484 FLU IMMUNIZE NO ADMIN: HCPCS | Performed by: INTERNAL MEDICINE

## 2024-02-06 PROCEDURE — 93294 REM INTERROG EVL PM/LDLS PM: CPT | Performed by: INTERNAL MEDICINE

## 2024-02-06 PROCEDURE — 3078F DIAST BP <80 MM HG: CPT | Performed by: INTERNAL MEDICINE

## 2024-02-06 PROCEDURE — 99214 OFFICE O/P EST MOD 30 MIN: CPT | Performed by: INTERNAL MEDICINE

## 2024-02-06 PROCEDURE — 93296 REM INTERROG EVL PM/IDS: CPT | Performed by: INTERNAL MEDICINE

## 2024-02-06 PROCEDURE — G8417 CALC BMI ABV UP PARAM F/U: HCPCS | Performed by: INTERNAL MEDICINE

## 2024-02-06 PROCEDURE — 1036F TOBACCO NON-USER: CPT | Performed by: INTERNAL MEDICINE

## 2024-02-06 RX ORDER — AMIODARONE HYDROCHLORIDE 200 MG/1
TABLET ORAL
Qty: 88 TABLET | Refills: 0 | Status: SHIPPED | OUTPATIENT
Start: 2024-02-06 | End: 2024-04-20

## 2024-02-06 NOTE — PROGRESS NOTES
induction with admission.  - Arrange follow up post induction.    02/06/2024  Patient presents for follow up.  We discussed options including ablation and amiodarone.  He would like to start on amiodarone and schedule cardioversion.  We will have him stop dofetilide today and start amiodarone 400 mg daily for 2 weeks then 200 mg daily moving forward.  We will scheduled cardioversion in 3 weeks.  - Continue metoprolol succinate 25 mg daily.  - Continue aspirin.  - Stop dofetilide and start amiodarone as per above.  - Schedule cardioversion in 3 weeks.  - We will arrange follow up post cardioversion.    2. Ischemic cardiomyopathy with recovered LVEF status post PCI x 3.  11/28/2023 - 02/07/2024  - Per Dr. Villagomez.    RECOMMENDATIONS:  Remote device checks every 3 months.   Discussed risks and benefits of catheter ablation.   Reach out to Dr David's office to discuss sleep test results further.   Stop Tikosyn.  On Monday 2/12, start Amiodarone 400 mg daily for 2 weeks and then 200 daily thereafter.  Schedule for cardioversion in 3 weeks.   Follow up in 3 months.     QUALITY MEASURES  1. Tobacco Cessation Counseling: NA  2. Retake of BP if >140/90:   NA  3. Documentation to PCP/referring for new patient:  Sent to PCP at close of office visit  4. CAD patient on anti-platelet: NA  5. CAD patient on STATIN therapy:  NA  6. Patient with CHF and aFib on anticoagulation:  s/p watchman     All questions and concerns were addressed to the patient/family. Alternatives to my treatment were discussed.    Dr. PAL Pérez MD  Electrophysiology  [facility].  [address] [place] [address]. [address].  [place] [address]  Phone: [phone]  Fax: [phone]     NOTE: This report was transcribed using voice recognition software. Every effort was made to ensure accuracy, however, inadvertent computerized transcription errors may be present.     I, Jessie Escamilla RN, am scribing for and in the presence of Dr. Timothy Pérez.

## 2024-02-06 NOTE — PATIENT INSTRUCTIONS
RECOMMENDATIONS:  Remote device checks every 3 months.   Discussed Amiodarone as an alternative to Tikosyn.  Discussed risks and benefits of catheter ablation.   Reach out to Dr David's office to discuss sleep test results further.   Patient would like to start Amiodarone. Give the medication about a month or so to see if this helps. Will monitor with device.   Stop Tikosyn.  On Monday 2/12, start Amiodarone 400 mg daily for 2 weeks and then 200 daily thereafter.  Schedule for cardioversion in 3 weeks.   Follow up in 3 months.

## 2024-02-08 ENCOUNTER — TELEPHONE (OUTPATIENT)
Dept: CARDIOLOGY CLINIC | Age: 84
End: 2024-02-08

## 2024-02-12 NOTE — TELEPHONE ENCOUNTER
Procedure:  CV  Doctor:  Dr. Pérez  Date:  2/12/24  Time:  12pm  Arrival:  10:30am  Reps:  n/a  Anesthesia:  Yes      Spoke with patient's s/o. Please have patient arrive to the main entrance of [facility] ([address] [place] [id number]) and check in with the registration desk.  They will be directed to the Cath Lab.  Please call regarding medication instructions. Remind patient to be NPO after midnight (8 hours prior). Do not apply lotions/creams on skin the day of procedure.

## 2024-02-20 DIAGNOSIS — R79.89 LOW TESTOSTERONE: ICD-10-CM

## 2024-02-22 ENCOUNTER — TELEPHONE (OUTPATIENT)
Dept: INTERNAL MEDICINE CLINIC | Age: 84
End: 2024-02-22

## 2024-02-22 DIAGNOSIS — R79.89 LOW TESTOSTERONE: Primary | ICD-10-CM

## 2024-02-22 LAB
SHBG SERPL-SCNC: 40 NMOL/L (ref 11–80)
TESTOST FREE SERPL-MCNC: 32.9 PG/ML (ref 47–244)
TESTOST SERPL-MCNC: 195 NG/DL (ref 220–1000)

## 2024-02-22 NOTE — TELEPHONE ENCOUNTER
----- Message from Petr Grijalva MD sent at 2/22/2024  3:25 PM EST -----  Low testosterone levels noted   Recommend urology input given elderly age   If needed they might start supplements

## 2024-02-23 NOTE — TELEPHONE ENCOUNTER
Spoke with patient's wife and relayed medication instructions. She V/U.       HOLD morning of procedure:  B complex  Calcium  Insulin  Multivitamin  Vitamin D  Zinc

## 2024-02-28 RX ORDER — LEVOTHYROXINE SODIUM 0.05 MG/1
50 TABLET ORAL DAILY
Qty: 90 TABLET | Refills: 1 | Status: SHIPPED | OUTPATIENT
Start: 2024-02-28

## 2024-02-28 RX ORDER — METOPROLOL SUCCINATE 25 MG/1
25 TABLET, EXTENDED RELEASE ORAL DAILY
Qty: 90 TABLET | Refills: 1 | Status: SHIPPED | OUTPATIENT
Start: 2024-02-28

## 2024-02-28 RX ORDER — TAMSULOSIN HYDROCHLORIDE 0.4 MG/1
0.4 CAPSULE ORAL DAILY
Qty: 90 CAPSULE | Refills: 0 | Status: SHIPPED | OUTPATIENT
Start: 2024-02-28

## 2024-03-03 ENCOUNTER — ANESTHESIA EVENT (OUTPATIENT)
Dept: CARDIAC CATH/INVASIVE PROCEDURES | Age: 84
End: 2024-03-03
Payer: MEDICARE

## 2024-03-04 ENCOUNTER — HOSPITAL ENCOUNTER (OUTPATIENT)
Dept: CARDIAC CATH/INVASIVE PROCEDURES | Age: 84
Discharge: HOME | End: 2024-03-04
Attending: INTERNAL MEDICINE | Admitting: INTERNAL MEDICINE
Payer: MEDICARE

## 2024-03-04 ENCOUNTER — ANESTHESIA (OUTPATIENT)
Dept: CARDIAC CATH/INVASIVE PROCEDURES | Age: 84
End: 2024-03-04
Payer: MEDICARE

## 2024-03-04 VITALS
DIASTOLIC BLOOD PRESSURE: 69 MMHG | RESPIRATION RATE: 16 BRPM | OXYGEN SATURATION: 98 % | SYSTOLIC BLOOD PRESSURE: 129 MMHG | HEART RATE: 63 BPM

## 2024-03-04 LAB
ANION GAP SERPL CALCULATED.3IONS-SCNC: 10 MMOL/L (ref 3–16)
BUN SERPL-MCNC: 9 MG/DL (ref 7–20)
CALCIUM SERPL-MCNC: 8.6 MG/DL (ref 8.3–10.6)
CHLORIDE SERPL-SCNC: 99 MMOL/L (ref 99–110)
CO2 SERPL-SCNC: 29 MMOL/L (ref 21–32)
CREAT SERPL-MCNC: 1 MG/DL (ref 0.8–1.3)
DEPRECATED RDW RBC AUTO: 13.8 % (ref 12.4–15.4)
GFR SERPLBLD CREATININE-BSD FMLA CKD-EPI: >60 ML/MIN/{1.73_M2}
GLUCOSE SERPL-MCNC: 129 MG/DL (ref 70–99)
HCT VFR BLD AUTO: 41.1 % (ref 40.5–52.5)
HGB BLD-MCNC: 14.3 G/DL (ref 13.5–17.5)
MCH RBC QN AUTO: 33.2 PG (ref 26–34)
MCHC RBC AUTO-ENTMCNC: 34.7 G/DL (ref 31–36)
MCV RBC AUTO: 95.7 FL (ref 80–100)
PLATELET # BLD AUTO: 173 K/UL (ref 135–450)
PMV BLD AUTO: 8 FL (ref 5–10.5)
POTASSIUM SERPL-SCNC: 3.8 MMOL/L (ref 3.5–5.1)
RBC # BLD AUTO: 4.29 M/UL (ref 4.2–5.9)
SODIUM SERPL-SCNC: 138 MMOL/L (ref 136–145)
WBC # BLD AUTO: 9.1 K/UL (ref 4–11)

## 2024-03-04 PROCEDURE — 93005 ELECTROCARDIOGRAM TRACING: CPT | Performed by: INTERNAL MEDICINE

## 2024-03-04 PROCEDURE — 93799 UNLISTED CV SVC/PROCEDURE: CPT | Performed by: INTERNAL MEDICINE

## 2024-03-04 PROCEDURE — 92960 CARDIOVERSION ELECTRIC EXT: CPT

## 2024-03-04 PROCEDURE — 80048 BASIC METABOLIC PNL TOTAL CA: CPT

## 2024-03-04 PROCEDURE — 85027 COMPLETE CBC AUTOMATED: CPT

## 2024-03-04 RX ORDER — SODIUM CHLORIDE 0.9 % (FLUSH) 0.9 %
5-40 SYRINGE (ML) INJECTION PRN
Status: DISCONTINUED | OUTPATIENT
Start: 2024-03-04 | End: 2024-03-04 | Stop reason: HOSPADM

## 2024-03-04 RX ORDER — OXYCODONE HYDROCHLORIDE 5 MG/1
10 TABLET ORAL PRN
OUTPATIENT
Start: 2024-03-04 | End: 2024-03-04

## 2024-03-04 RX ORDER — SODIUM CHLORIDE 0.9 % (FLUSH) 0.9 %
5-40 SYRINGE (ML) INJECTION PRN
OUTPATIENT
Start: 2024-03-04

## 2024-03-04 RX ORDER — OXYCODONE HYDROCHLORIDE 5 MG/1
5 TABLET ORAL PRN
OUTPATIENT
Start: 2024-03-04 | End: 2024-03-04

## 2024-03-04 RX ORDER — ONDANSETRON 2 MG/ML
4 INJECTION INTRAMUSCULAR; INTRAVENOUS EVERY 30 MIN PRN
OUTPATIENT
Start: 2024-03-04

## 2024-03-04 RX ORDER — LABETALOL HYDROCHLORIDE 5 MG/ML
10 INJECTION, SOLUTION INTRAVENOUS
OUTPATIENT
Start: 2024-03-04

## 2024-03-04 RX ORDER — SODIUM CHLORIDE 9 MG/ML
INJECTION, SOLUTION INTRAVENOUS PRN
OUTPATIENT
Start: 2024-03-04

## 2024-03-04 RX ORDER — SODIUM CHLORIDE 9 MG/ML
INJECTION, SOLUTION INTRAVENOUS PRN
Status: DISCONTINUED | OUTPATIENT
Start: 2024-03-04 | End: 2024-03-04 | Stop reason: HOSPADM

## 2024-03-04 RX ORDER — SODIUM CHLORIDE 0.9 % (FLUSH) 0.9 %
5-40 SYRINGE (ML) INJECTION EVERY 12 HOURS SCHEDULED
OUTPATIENT
Start: 2024-03-04

## 2024-03-04 RX ORDER — SODIUM CHLORIDE 0.9 % (FLUSH) 0.9 %
5-40 SYRINGE (ML) INJECTION EVERY 12 HOURS SCHEDULED
Status: DISCONTINUED | OUTPATIENT
Start: 2024-03-04 | End: 2024-03-04 | Stop reason: HOSPADM

## 2024-03-04 NOTE — PROGRESS NOTES
CATH LAB PROCEDURE LOG - CARDIOVERSION      PRE Procedure:    ARRIVAL TIME: 10:48 AM    Pt arrived to Cath Lab.   Plan of Care:   Hemodynamics and cardiac rhythm will remain stable.   Comfort level will be maintained.   Respiratory function will remain adequate.   Pt/family will verbalize understanding of the procedure.   Procedure will be tolerated without complications.   Patient will recover from procedure without complications.   ID armband on patient and identification verified.   Informed consent obtained.   Non invasive blood pressure cuff applied, monitoring initiated.   EKG pads and pulse oximeter applied, monitoring initiated.   Instructions given. Patient and / or family verbalize understanding.   H&P will be documented by physician in Epic.     Pt has been NPO since midnight.     Post DCCV EKG ordered? Yes      Cardioversion:    Timeout and Fire Safety completed at 11:47 AM.     Correct patient verified.   Members of the surgical team/visitors introduced.   Allergies announced.   Correct procedure verified.   Correct procedure site verified.   Consents verified.   Implant equipment, additional services, special requirements available.   Medications labeled and available.   Appropriate pre medications have been administered.     Alcohol prep solution had sufficient time to dissipate if used. Yes=1, No=0  Surgical site or incision above the xyphoid. Yes=1, No=0  Open oxygen source. Yes=1, No=0  Available ignition source. Yes=1, No=0  Score total - 1.     CV cancelled, Dr Pérez performed overdrive pacing to convert out of atrial tachycardia without sedation using [employer] pacemaker interrogator.    POST Procedure:    DISCHARGE TIME: 11:54 am    Patient/family given discharge instructions. Patient/family verbalize understanding of discharge instructions, all questions addressed, copy given to patient/family. Pt transferred to vehicle via wheelchair to be discharged home with family.

## 2024-03-04 NOTE — ANESTHESIA PRE PROCEDURE
Department of Anesthesiology  Preprocedure Note       Name:  William Krishna   Age:  83 y.o.  :  1940                                          MRN:  [id number]         Date:  3/4/2024      Surgeon: * Surgery not found *    Procedure:     Medications prior to admission:   Prior to Admission medications    Medication Sig Start Date End Date Taking? Authorizing Provider   tamsulosin (FLOMAX) 0.4 MG capsule TAKE 1 CAPSULE DAILY 24   Petr Grijalva MD   metoprolol succinate (TOPROL XL) 25 MG extended release tablet TAKE 1 TABLET DAILY 24   Alberto Villagomez MD   levothyroxine (SYNTHROID) 50 MCG tablet Take 1 tablet by mouth daily 24   Petr Grijalva MD   amiodarone (CORDARONE) 200 MG tablet Take 2 tablets by mouth daily for 14 days, THEN 1 tablet daily. 24  FIONA Pérez Jr., MD   VICTOZA 18 MG/3ML SOPN SC injection INJECT 1.8MG SUBCUTANEOUSLYDAILY 24   Petr Grijalva MD   aspirin 81 MG EC tablet Take 1 tablet by mouth daily    Nghia Quintero MD   losartan (COZAAR) 25 MG tablet Take 1 tablet by mouth daily 23   Petr Grijalva MD   Evolocumab 140 MG/ML SOAJ Inject 140 mg into the skin every 14 days 23   Alberto Villagomez MD   ezetimibe (ZETIA) 10 MG tablet TAKE 1 TABLET DAILY 23   Alberto Villagomez MD   zinc gluconate 50 MG tablet Take 1 tablet by mouth daily    Nghia Quintero MD   ACCU-CHEK ELBERT PLUS strip  22   Nghia Quintero MD   Insulin Pen Needle (BD PEN NEEDLE JM 2ND GEN) 32G X 4 MM MISC Use with Victoza daily.  DX:E11.9 21   Petr Grijalva MD   b complex vitamins capsule Take 1 capsule by mouth daily    Nghia Quintero MD   vitamin D 25 MCG (1000 UT) CAPS Take 1 capsule by mouth daily    Nghia Quintero MD   Multiple Vitamins-Minerals (PRESERVISION AREDS 2 PO) Take 1 tablet by mouth daily    Nghia Quintero MD   CALCIUM PO Take 1,200 mg by mouth daily     Leon

## 2024-03-04 NOTE — PROGRESS NOTES
Iv removed and patient dressed. Cardioversion cancelled bc Dr. Pérez able to pace patient out of irregular rhythm. Belongings gathered and wheeled to front entrance

## 2024-03-05 LAB
EKG ATRIAL RATE: 174 BPM
EKG DIAGNOSIS: NORMAL
EKG Q-T INTERVAL: 490 MS
EKG QRS DURATION: 172 MS
EKG QTC CALCULATION (BAZETT): 529 MS
EKG R AXIS: 136 DEGREES
EKG T AXIS: 39 DEGREES
EKG VENTRICULAR RATE: 70 BPM

## 2024-03-05 PROCEDURE — 93010 ELECTROCARDIOGRAM REPORT: CPT | Performed by: INTERNAL MEDICINE

## 2024-03-06 NOTE — PROCEDURES
[facility]     Electrophysiology Procedure Note       Date of Procedure: 3/6/2024  Patient's Name: William Krishna  YOB: 1940   Medical Record Number: [id number]  Referring Physician: Mora att. providers found  Procedure Performed by: Timothy Pérez MD    Procedures performed:  Anesthesia: Monitored Anesthesia Care  Level of sedation plan: None.  Over drive pacing for cardioversion.    Indication of the procedure: Symptomatic atrial flutter.    Details of procedure:   The patient was brought to the cath lab area in a fasting and non-sedated state. The risks, benefits and alternatives of the procedure were discussed with the patient. The patient opted to proceed with the procedure. Written informed consent was signed and placed in the chart.  A timeout protocol was completed to identify the patient and the procedure being performed. Patient is on chronic anticoagulation therapy.  The patient was monitored continuously with ECG, pulse oximetry, blood pressure monitoring, and direct observation. Using patient's device I was able to over drive pace and convert patient to NSR.  No sedation or electrocardioversion required.    Specimen collected: none    Estimated blood loss: none    The patient tolerated the procedure well and there were no complications.     Conclusion:   Successful pace termination of flutter with [employer] device.    Plan:   The patient can be discharged if remains stable. Will continue with amiodarone, and metoprolol.. The patient will follow-up with Dr. Pérez as an outpatient.    Thank you for allowing me to participate in the care of this patient. If you have any questions, please feel free to contact me.    Timothy Pérez MD  Cardiac Electrophysiology  [facility]  [phone] [facility]

## 2024-03-23 ENCOUNTER — HOSPITAL ENCOUNTER (OUTPATIENT)
Dept: CT IMAGING | Age: 84
Discharge: HOME | End: 2024-03-23
Attending: UROLOGY
Payer: MEDICARE

## 2024-03-23 DIAGNOSIS — D41.02 NEOPLASM OF UNCERTAIN BEHAVIOR OF LEFT KIDNEY: ICD-10-CM

## 2024-03-23 DIAGNOSIS — N40.1 BENIGN PROSTATIC HYPERPLASIA WITH LOWER URINARY TRACT SYMPTOMS, SYMPTOM DETAILS UNSPECIFIED: ICD-10-CM

## 2024-03-23 DIAGNOSIS — E11.9 TYPE 2 DIABETES MELLITUS WITHOUT COMPLICATION, UNSPECIFIED WHETHER LONG TERM INSULIN USE (HCC): ICD-10-CM

## 2024-03-23 DIAGNOSIS — C64.2 MALIGNANT NEOPLASM OF LEFT KIDNEY, EXCEPT RENAL PELVIS (HCC): ICD-10-CM

## 2024-03-23 DIAGNOSIS — D45 POLYCYTHEMIA VERA (HCC): ICD-10-CM

## 2024-03-23 DIAGNOSIS — E29.1 TESTICULAR HYPOFUNCTION: ICD-10-CM

## 2024-03-23 DIAGNOSIS — E66.9 OBESITY, UNSPECIFIED CLASSIFICATION, UNSPECIFIED OBESITY TYPE, UNSPECIFIED WHETHER SERIOUS COMORBIDITY PRESENT: ICD-10-CM

## 2024-03-23 PROCEDURE — 6360000004 HC RX CONTRAST MEDICATION: Performed by: UROLOGY

## 2024-03-23 PROCEDURE — 74177 CT ABD & PELVIS W/CONTRAST: CPT

## 2024-03-23 RX ADMIN — DIATRIZOATE MEGLUMINE AND DIATRIZOATE SODIUM 12 ML: 660; 100 LIQUID ORAL; RECTAL at 09:25

## 2024-03-23 RX ADMIN — IOPAMIDOL 75 ML: 755 INJECTION, SOLUTION INTRAVENOUS at 09:38

## 2024-04-18 DIAGNOSIS — I48.11 LONGSTANDING PERSISTENT ATRIAL FIBRILLATION (HCC): ICD-10-CM

## 2024-04-18 NOTE — TELEPHONE ENCOUNTER
Procedure 03/04/2024 Cardioversion  Last OV  02/06/2024  Upcoming OV 05/07/2024  TSH 1/2024  LFT 1/2024  BMP 3/2024  CBC 3/2024  EKG 3/2024

## 2024-04-19 RX ORDER — AMIODARONE HYDROCHLORIDE 200 MG/1
200 TABLET ORAL DAILY
Qty: 90 TABLET | Refills: 1 | Status: SHIPPED | OUTPATIENT
Start: 2024-04-19

## 2024-04-25 ENCOUNTER — TELEPHONE (OUTPATIENT)
Dept: CARDIOLOGY CLINIC | Age: 84
End: 2024-04-25

## 2024-04-25 NOTE — TELEPHONE ENCOUNTER
Pt's DP. Stats pt is in A-fib per [employer], she will try to send through [employer] nelida. Also sending remote transmission. They also think amiodarone (CORDARONE) 200 MG tablet is causing side effects. Pt is shaking and having trouble walking. Denies any CP, dizziness or SOB. Wants to know can they d/c med till seen in May. Please advise.

## 2024-04-25 NOTE — TELEPHONE ENCOUNTER
Transmission received. AGK please review and advise based off of patient's symptoms below. See MURJ for interrogation.

## 2024-04-25 NOTE — TELEPHONE ENCOUNTER
Attempted to call patient, we have not received transmission. Patient needs to send transmission so we can see if any arrhythmias have been recorded. If he needs assistance sending, please have him call [employer] directly at [phone].

## 2024-04-25 NOTE — TELEPHONE ENCOUNTER
Spoke w/ patient and wife. Attempted to reset bedside monitor but was unsuccessful. Spoke w/ [employer] on behalf of the patient and transferred patient and wife to BS via phone to troubleshoot.

## 2024-04-26 ENCOUNTER — TELEPHONE (OUTPATIENT)
Dept: INTERNAL MEDICINE CLINIC | Age: 84
End: 2024-04-26

## 2024-04-26 NOTE — TELEPHONE ENCOUNTER
----- Message from Soha Augustinecock sent at 4/26/2024 12:56 PM EDT -----  Contact: [phone] ronny  Pt states she received a denial letter from insurance for below medication. Pt asking what should they do? Please advise         Victoza  VICTOZA 18 MG/3ML SOPN SC injection         [facility] - [facility] - P [phone] - F [phone] (Ph: [phone])

## 2024-05-07 ENCOUNTER — OFFICE VISIT (OUTPATIENT)
Dept: CARDIOLOGY CLINIC | Age: 84
End: 2024-05-07
Payer: MEDICARE

## 2024-05-07 ENCOUNTER — NURSE ONLY (OUTPATIENT)
Dept: CARDIOLOGY CLINIC | Age: 84
End: 2024-05-07
Payer: MEDICARE

## 2024-05-07 VITALS
WEIGHT: 270 LBS | DIASTOLIC BLOOD PRESSURE: 62 MMHG | HEIGHT: 72 IN | OXYGEN SATURATION: 97 % | SYSTOLIC BLOOD PRESSURE: 126 MMHG | BODY MASS INDEX: 36.57 KG/M2 | HEART RATE: 60 BPM

## 2024-05-07 DIAGNOSIS — Z95.0 CARDIAC RESYNCHRONIZATION THERAPY PACEMAKER (CRT-P) IN PLACE: Primary | ICD-10-CM

## 2024-05-07 DIAGNOSIS — I49.5 SSS (SICK SINUS SYNDROME) (HCC): ICD-10-CM

## 2024-05-07 DIAGNOSIS — Z95.0 CARDIAC RESYNCHRONIZATION THERAPY PACEMAKER (CRT-P) IN PLACE: ICD-10-CM

## 2024-05-07 DIAGNOSIS — I48.11 LONGSTANDING PERSISTENT ATRIAL FIBRILLATION (HCC): Primary | ICD-10-CM

## 2024-05-07 PROCEDURE — 3074F SYST BP LT 130 MM HG: CPT | Performed by: INTERNAL MEDICINE

## 2024-05-07 PROCEDURE — 93000 ELECTROCARDIOGRAM COMPLETE: CPT | Performed by: INTERNAL MEDICINE

## 2024-05-07 PROCEDURE — 1036F TOBACCO NON-USER: CPT | Performed by: INTERNAL MEDICINE

## 2024-05-07 PROCEDURE — 3078F DIAST BP <80 MM HG: CPT | Performed by: INTERNAL MEDICINE

## 2024-05-07 PROCEDURE — G8427 DOCREV CUR MEDS BY ELIG CLIN: HCPCS | Performed by: INTERNAL MEDICINE

## 2024-05-07 PROCEDURE — G8417 CALC BMI ABV UP PARAM F/U: HCPCS | Performed by: INTERNAL MEDICINE

## 2024-05-07 PROCEDURE — 1123F ACP DISCUSS/DSCN MKR DOCD: CPT | Performed by: INTERNAL MEDICINE

## 2024-05-07 PROCEDURE — 99214 OFFICE O/P EST MOD 30 MIN: CPT | Performed by: INTERNAL MEDICINE

## 2024-05-07 NOTE — PATIENT INSTRUCTIONS
RECOMMENDATIONS:  Remote device checks every 3 months.   Stop amiodarone.   Need to establish with Dr Samuel for ischemic cardiomyopathy.  Follow up in 6 months with EP NP.

## 2024-05-07 NOTE — PROGRESS NOTES
[facility]   Electrophysiology Consult Note            Date: 5/7/24  Patient Name: William Krishna  YOB: 1940    Primary Care Physician: Petr Grijalva MD    CHIEF COMPLAINT:   Chief Complaint   Patient presents with    3 Month Follow-Up    Device Check    Atrial Fibrillation     HISTORY OF PRESENT ILLNESS: William Krishna is a 83 y.o. male with a PMH significant for CAD, PAF s/p watchman device, HTN, symptomatic bradycardia s/p pacemaker, renal CA. He had OV with cardiology in 8/2023 and had complaints of fatigue.  Patient was admitted in 12/2023 with persistent AF and was started on Tikosyn. Device showed recurrence of AF in 1/2024.      Interval History since last office visit: Tikosyn was stopped at OV in 2/2024 and amiodarone was started. Patient underwent DCCV on 3/6/2024.    Today, 5/7/2024, Device interrogation demonstrated AP 33%,  98%, events no AF, DENISE 8 years. He reports that he gets tremulous on his left side at times. His wife reports that his balance has been worse as well. He does not notice much of a difference since cardioversion. He is taking his medications as prescribed. Denies recent issues with bleeding or bruising. Patient denies current edema, shortness of breath, palpitations, dizziness or syncope.     Past Medical History:   has a past medical history of Arthritis, CAD (coronary artery disease), Diabetes mellitus (HCC), Hearing decreased, Hyperlipidemia, Hypertension, and Sneezing.    Past Surgical History:   has a past surgical history that includes Cardiac surgery; eye surgery (cataracts both eyes); and cyst removal (2/10/2012).     Allergies:  Statins and Food    Social History:   reports that he quit smoking about 53 years ago. His smoking use included cigarettes. He has never used smokeless tobacco. He reports current alcohol use. He reports that he does not use drugs.     Family History: family history is not on file.    Home Medications:    Prior to

## 2024-05-08 PROCEDURE — 93281 PM DEVICE PROGR EVAL MULTI: CPT | Performed by: INTERNAL MEDICINE

## 2024-06-04 ENCOUNTER — OFFICE VISIT (OUTPATIENT)
Dept: INTERNAL MEDICINE CLINIC | Age: 84
End: 2024-06-04

## 2024-06-04 VITALS
RESPIRATION RATE: 18 BRPM | WEIGHT: 270 LBS | HEIGHT: 72 IN | HEART RATE: 65 BPM | DIASTOLIC BLOOD PRESSURE: 82 MMHG | SYSTOLIC BLOOD PRESSURE: 140 MMHG | BODY MASS INDEX: 36.57 KG/M2

## 2024-06-04 DIAGNOSIS — E11.42 TYPE 2 DIABETES MELLITUS WITH DIABETIC POLYNEUROPATHY, WITHOUT LONG-TERM CURRENT USE OF INSULIN (HCC): ICD-10-CM

## 2024-06-04 DIAGNOSIS — I25.5 ISCHEMIC CARDIOMYOPATHY: ICD-10-CM

## 2024-06-04 DIAGNOSIS — E11.42 TYPE 2 DIABETES MELLITUS WITH DIABETIC POLYNEUROPATHY, WITHOUT LONG-TERM CURRENT USE OF INSULIN (HCC): Primary | ICD-10-CM

## 2024-06-04 DIAGNOSIS — E78.2 MIXED HYPERLIPIDEMIA: ICD-10-CM

## 2024-06-04 DIAGNOSIS — I50.22 CHRONIC SYSTOLIC CHF (CONGESTIVE HEART FAILURE) (HCC): ICD-10-CM

## 2024-06-04 DIAGNOSIS — I25.10 CORONARY ARTERY DISEASE INVOLVING NATIVE CORONARY ARTERY OF NATIVE HEART WITHOUT ANGINA PECTORIS: ICD-10-CM

## 2024-06-04 DIAGNOSIS — I48.11 LONGSTANDING PERSISTENT ATRIAL FIBRILLATION (HCC): ICD-10-CM

## 2024-06-04 DIAGNOSIS — C64.2 RENAL CELL CARCINOMA, LEFT (HCC): ICD-10-CM

## 2024-06-04 DIAGNOSIS — I10 ESSENTIAL HYPERTENSION: ICD-10-CM

## 2024-06-04 DIAGNOSIS — R79.89 LOW TESTOSTERONE: ICD-10-CM

## 2024-06-04 PROCEDURE — G8427 DOCREV CUR MEDS BY ELIG CLIN: HCPCS | Performed by: INTERNAL MEDICINE

## 2024-06-04 PROCEDURE — 3044F HG A1C LEVEL LT 7.0%: CPT | Performed by: INTERNAL MEDICINE

## 2024-06-04 PROCEDURE — 99213 OFFICE O/P EST LOW 20 MIN: CPT | Performed by: INTERNAL MEDICINE

## 2024-06-04 PROCEDURE — 3079F DIAST BP 80-89 MM HG: CPT | Performed by: INTERNAL MEDICINE

## 2024-06-04 PROCEDURE — 1123F ACP DISCUSS/DSCN MKR DOCD: CPT | Performed by: INTERNAL MEDICINE

## 2024-06-04 PROCEDURE — 3077F SYST BP >= 140 MM HG: CPT | Performed by: INTERNAL MEDICINE

## 2024-06-04 PROCEDURE — G8417 CALC BMI ABV UP PARAM F/U: HCPCS | Performed by: INTERNAL MEDICINE

## 2024-06-04 PROCEDURE — 1036F TOBACCO NON-USER: CPT | Performed by: INTERNAL MEDICINE

## 2024-06-04 NOTE — PROGRESS NOTES
Cardiovascular: Negative for chest pain   Gastrointestinal:neg for abd pain, nausea or vomiting or diarrhea  No melena or BRPR  Genitourinary: Negative for hematuria   Musculoskeletal: Negative for arthralgias   Skin: Negative for rash   Neurological: Negative for syncope + hearing def + vision def   Hematological: Negative for adenopathy   Psychiatric/Behavorial: Negative for anxiety        Objective   Physical Exam      There were no vitals filed for this visit.            General:  Elderly male, appropriate appearing  Awake, alert and oriented. Appears to be not in any distress  Chronic vision issues with poor vision   Mucous Membranes:  Pink , anicteric  Hearing aids noted   Neck: No JVD, no carotid bruit, no thyromegaly  Chest:  Clear to auscultation bilaterally, no added sounds  Cardiovascular:  Irregular, left pacer   S1S2 heard, no murmurs or gallops  Abdomen:  Soft, undistended, obese  non tender, no organomegaly, BS present  Extremities: No edema or cyanosis. Distal pulses well felt  Skin rash - erythematous on beard line and behind ears noted   Neurological : grossly normal with poor vision and hearing issues  Memory intact  Gait steady      .STARLA 2019     1. Status post 27mm Watchman device implantation. There is a small (<3mm) mirlande-device   leak.   2. No pericardial effusion.   3. Mild Aortic Stenosis   4. Small residual mirlande-procedure ASD w/ predominant left to right shunt.       Ct abd     Gastric distention with fluid.       Diverticulosis without inflammatory change.       Postsurgical changes suspected involving the lower anterior left kidney,   partial nephrectomy.       Large duodenal diverticuli arising from the distal descending duodenum.             ECHO 3/22       Summary   Technically difficult examination.   Left ventricular systolic function is normal with ejection fraction   estimated at 55%.   There is mild concentric left ventricular hypertrophy.   Grade I diastolic dysfunction with

## 2024-06-05 LAB
ANION GAP SERPL CALCULATED.3IONS-SCNC: 14 MMOL/L (ref 3–16)
BUN SERPL-MCNC: 8 MG/DL (ref 7–20)
CALCIUM SERPL-MCNC: 9.4 MG/DL (ref 8.3–10.6)
CHLORIDE SERPL-SCNC: 99 MMOL/L (ref 99–110)
CO2 SERPL-SCNC: 28 MMOL/L (ref 21–32)
CREAT SERPL-MCNC: 0.9 MG/DL (ref 0.8–1.3)
EST. AVERAGE GLUCOSE BLD GHB EST-MCNC: 108.3 MG/DL
GFR SERPLBLD CREATININE-BSD FMLA CKD-EPI: 85 ML/MIN/{1.73_M2}
GLUCOSE SERPL-MCNC: 107 MG/DL (ref 70–99)
HBA1C MFR BLD: 5.4 %
NT-PROBNP SERPL-MCNC: 646 PG/ML (ref 0–449)
POTASSIUM SERPL-SCNC: 3.9 MMOL/L (ref 3.5–5.1)
SODIUM SERPL-SCNC: 141 MMOL/L (ref 136–145)

## 2024-06-28 NOTE — PROGRESS NOTES
[facility] [place]   Cardiovascular Evaluation          PCP:  Petr Grijalva MD    Reason for evaluation:   Fatigue, CAD, ischemic cardiomyopathy     Subjective:   History of present illness on initial date of evaluation:  William Krishna is a 83 y.o. male patient with a history of CAD s/p multiple PCI's, ischemic cardiomyopathy, atrial fibrillation s/p Watchman device due to history of severe GI bleed, symptomatic bradycardia s/p Bi-V pacemaker, mild aortic stenosis, renal cell carcinoma s/p partial left nephrectomy, Staargardt's eye disease, essential hypertension, hyperlipidemia, JAIDEN, and obesity referred by Dr. Pérez for further evaluation of fatigue and ischemic heart disease.    The patient reports that he has recently been feeling very fatigued.  He states that he is getting more sleep, but says that he feels tired when he gets up in the morning.  He denies any recent chest pain and says that when he had his MI in the past he had associated chest pain.  His last episode of chest pain he says occurred when they were manipulating his pacemaker.  He says that he occasionally has palpitations.  He does note that he gets short of breath when trying to roll over in bed.  He says that his whole body is swollen due to his history of rhabdomyolysis and lack of exercise.  He says that he is lost a significant amount of his vision due to his underlying eye disease.      Past Medical History:   has a past medical history of Arthritis, CAD (coronary artery disease), Diabetes mellitus (HCC), Hearing decreased, Hyperlipidemia, Hypertension, and Sneezing.    Surgical History:   has a past surgical history that includes Cardiac surgery; eye surgery (cataracts both eyes); and cyst removal (2/10/2012).     Social History:   reports that he quit smoking about 53 years ago. His smoking use included cigarettes. He has never used smokeless tobacco. He reports current alcohol use. He reports that he does

## 2024-07-05 ENCOUNTER — TELEPHONE (OUTPATIENT)
Dept: CARDIOLOGY CLINIC | Age: 84
End: 2024-07-05

## 2024-07-05 NOTE — TELEPHONE ENCOUNTER
7/5/24 [facility] for pt to call the office to confirm location for upcoming new pt ov with [facility] for 7/10/24 at [place].

## 2024-07-08 NOTE — TELEPHONE ENCOUNTER
7/8- called pt @[phone] [facility] with appt date, time, address. Per appt notes location is confirmed by pt.

## 2024-07-09 ENCOUNTER — TELEPHONE (OUTPATIENT)
Dept: INTERNAL MEDICINE CLINIC | Age: 84
End: 2024-07-09

## 2024-07-09 RX ORDER — LIRAGLUTIDE 6 MG/ML
1.2 INJECTION SUBCUTANEOUS DAILY
Qty: 6 ADJUSTABLE DOSE PRE-FILLED PEN SYRINGE | Refills: 0 | Status: SHIPPED | OUTPATIENT
Start: 2024-07-09 | End: 2024-07-10 | Stop reason: SDUPTHER

## 2024-07-09 RX ORDER — LIRAGLUTIDE 6 MG/ML
INJECTION SUBCUTANEOUS
Refills: 3 | OUTPATIENT
Start: 2024-07-09

## 2024-07-09 NOTE — TELEPHONE ENCOUNTER
----- Message from Petr Grijalva MD sent at 7/9/2024  2:23 PM EDT -----  Contact: [phone]  Ok ot change to generic 18 mg pen  ----- Message -----  From: Gamble Shauna  Sent: 7/9/2024   2:16 PM EDT  To: Petr Grijalva MD    Victoza only comes in the 18 MG pen. Okay for generic or change to another medication?  ----- Message -----  From: Petr Grijalva MD  Sent: 7/9/2024   2:06 PM EDT  To: Norma Agudelo    Can do  vicotza 12 mg daily if available  ----- Message -----  From: Soha Flores  Sent: 7/9/2024  11:57 AM EDT  To: Petr Grijalva MD    Caller pharmacy, states VICTOZA 18 MG/3ML SOPN SC injection is on back order and requesting a substitute sent in. Please advise     Reference # [id number]      Pharmacy    [facility] [address] [facility] - P [phone] - F [phone]

## 2024-07-10 ENCOUNTER — OFFICE VISIT (OUTPATIENT)
Dept: CARDIOLOGY CLINIC | Age: 84
End: 2024-07-10
Payer: MEDICARE

## 2024-07-10 ENCOUNTER — TELEPHONE (OUTPATIENT)
Dept: INTERNAL MEDICINE CLINIC | Age: 84
End: 2024-07-10

## 2024-07-10 VITALS
WEIGHT: 271 LBS | OXYGEN SATURATION: 96 % | SYSTOLIC BLOOD PRESSURE: 132 MMHG | HEIGHT: 72 IN | HEART RATE: 64 BPM | BODY MASS INDEX: 36.7 KG/M2 | DIASTOLIC BLOOD PRESSURE: 78 MMHG

## 2024-07-10 DIAGNOSIS — E78.5 HYPERLIPIDEMIA, UNSPECIFIED HYPERLIPIDEMIA TYPE: ICD-10-CM

## 2024-07-10 DIAGNOSIS — Z95.0 S/P PLACEMENT OF CARDIAC PACEMAKER: ICD-10-CM

## 2024-07-10 DIAGNOSIS — I25.10 CORONARY ARTERY DISEASE INVOLVING NATIVE CORONARY ARTERY OF NATIVE HEART WITHOUT ANGINA PECTORIS: Primary | ICD-10-CM

## 2024-07-10 DIAGNOSIS — Z79.899 MEDICATION MANAGEMENT: ICD-10-CM

## 2024-07-10 DIAGNOSIS — I10 ESSENTIAL HYPERTENSION: ICD-10-CM

## 2024-07-10 DIAGNOSIS — I25.5 ISCHEMIC CARDIOMYOPATHY: ICD-10-CM

## 2024-07-10 DIAGNOSIS — G47.33 OSA (OBSTRUCTIVE SLEEP APNEA): ICD-10-CM

## 2024-07-10 DIAGNOSIS — I48.91 ATRIAL FIBRILLATION, UNSPECIFIED TYPE (HCC): ICD-10-CM

## 2024-07-10 DIAGNOSIS — R53.83 OTHER FATIGUE: ICD-10-CM

## 2024-07-10 PROCEDURE — 99204 OFFICE O/P NEW MOD 45 MIN: CPT | Performed by: INTERNAL MEDICINE

## 2024-07-10 PROCEDURE — 3075F SYST BP GE 130 - 139MM HG: CPT | Performed by: INTERNAL MEDICINE

## 2024-07-10 PROCEDURE — 1036F TOBACCO NON-USER: CPT | Performed by: INTERNAL MEDICINE

## 2024-07-10 PROCEDURE — 1123F ACP DISCUSS/DSCN MKR DOCD: CPT | Performed by: INTERNAL MEDICINE

## 2024-07-10 PROCEDURE — G8417 CALC BMI ABV UP PARAM F/U: HCPCS | Performed by: INTERNAL MEDICINE

## 2024-07-10 PROCEDURE — 93000 ELECTROCARDIOGRAM COMPLETE: CPT | Performed by: INTERNAL MEDICINE

## 2024-07-10 PROCEDURE — G8427 DOCREV CUR MEDS BY ELIG CLIN: HCPCS | Performed by: INTERNAL MEDICINE

## 2024-07-10 PROCEDURE — 3078F DIAST BP <80 MM HG: CPT | Performed by: INTERNAL MEDICINE

## 2024-07-10 RX ORDER — LIRAGLUTIDE 6 MG/ML
1.2 INJECTION SUBCUTANEOUS DAILY
Qty: 6 ADJUSTABLE DOSE PRE-FILLED PEN SYRINGE | Refills: 0 | Status: SHIPPED | OUTPATIENT
Start: 2024-07-10 | End: 2024-10-08

## 2024-07-10 RX ORDER — VALSARTAN 40 MG/1
20 TABLET ORAL DAILY
Qty: 30 TABLET | Refills: 1 | Status: SHIPPED | OUTPATIENT
Start: 2024-07-10

## 2024-07-10 RX ORDER — AMIODARONE HYDROCHLORIDE 200 MG/1
200 TABLET ORAL DAILY
COMMUNITY
End: 2024-07-18

## 2024-07-10 NOTE — PATIENT INSTRUCTIONS
Continue taking Aspirin 81 mg tablet, Repatha 140 mg subQ q 14 days, Metoprolol succinate 25 mg tablet daily.  Will arrange for pharmacological nuclear SPECT stress test for further cardiac risk stratification.   TTE ordered for complete assessment of cardiac structure and function   -Call [facility] central scheduling at [phone] to schedule testing.  4. Start taking 20mg valsartan daily  5. BMP and 1 week  6. Follow up with me in 3 months   
WDL

## 2024-07-10 NOTE — TELEPHONE ENCOUNTER
----- Message from Marcy Mann MA sent at 7/10/2024  3:14 PM EDT -----  Contact: [phone]  Patient states that their medication is out at their pharmacy and needs it transferred to a new one.     Liraglutide (VICTOZA) 18 MG/3ML SOPN SC injection        [facility] in [place]

## 2024-07-11 RX ORDER — EVOLOCUMAB 140 MG/ML
INJECTION, SOLUTION SUBCUTANEOUS
Qty: 6 ML | Refills: 3 | Status: SHIPPED | OUTPATIENT
Start: 2024-07-11

## 2024-07-11 NOTE — TELEPHONE ENCOUNTER
LOV  07/10/2024  [facility]  UPCOMING  07/18/2024  LIPID  06/04/2024  LFT  01/31/2024

## 2024-07-15 NOTE — PROGRESS NOTES
[facility]   Cardiac Office Note    Referring Provider:  Petr Grijalva MD     Chief Complaint   Patient presents with    Follow-up    Coronary Artery Disease    Hypertension    Hyperlipidemia    Atrial Fibrillation    Other     Balance and strength is getting worse daily. Still shaking from a medication he was on.    Fatigue       Prior followed Dr Juarez.   Subjective: Patient is being seen today for cardiac follow up; c/o weakness and balance problems    History of Present Illness:  Mr. William Krishna 83 y.o. male with PMH CAD s/p 3 stents and 2 PTCA, hx MI 2007, hx rhabdo with statins (takes praluent and zetia now), PAF dx 2017 (NO AC), s/p Watchman device implanted by Dr Polanco in [place], [place] due to massive GIB s/p 11 units PRBC's, hx ICM, renal cell CA s/p partial left nephrectomy, Staargardt's eye dz, HTN, HLD, DM, LBBB, mild AS, mild JAIDEN (declined CPAP), symptomatic bradycardia s/p [employer] Bi-V  4/21. No ICD due to EF=40% not qualifying.  Nuc stress 4/14/21 normal. ECHO 6/19 EF=35-40%. MUGA 4/14/21  EF 40%.   ECHO 3/10/22 LV EF=55%; mild LVH; grade I DD normal filling pressure; mild AS (velocity=2.83m/s; MPG=16mmHg). Limited Echo 1/5/2023 EF=55-60%; mild LVH; Ao root 4.1 cm; AV calcification with stenosis. OV 11/15/22, we stopped bystolic and entresto due to low BP 80's systolic. OV 2/9/23, wife reported his memory is better off bystolic and entresto.   Saw EP Dr. Pérez. Admitted on 12/4/2023 with persistent atrial fibrillation for initiation of Tikosyn. Stopped tikoysn 2/6/24 and started amiodarone 200mg daily. Underwent DCCV on 3/6/2024. OV with Dr. Pérez stopped amiodarone. He is back in afib. Device check 5/8/24 A PACING 33% RV PACING 98% LV PACING 100% AT/AF BURDEN 28%. Amiodarone stopped 5/8/24. EKG 7/10/24 V-paced 67bpm.   Today, he is present with his wife. He has a rollator and walking stick. He reports increased generalized weakness along with balance

## 2024-07-18 ENCOUNTER — HOSPITAL ENCOUNTER (OUTPATIENT)
Age: 84
Discharge: HOME | End: 2024-07-18
Attending: INTERNAL MEDICINE

## 2024-07-18 ENCOUNTER — OFFICE VISIT (OUTPATIENT)
Dept: CARDIOLOGY CLINIC | Age: 84
End: 2024-07-18
Payer: MEDICARE

## 2024-07-18 ENCOUNTER — TELEPHONE (OUTPATIENT)
Dept: CARDIOLOGY CLINIC | Age: 84
End: 2024-07-18

## 2024-07-18 VITALS
SYSTOLIC BLOOD PRESSURE: 132 MMHG | BODY MASS INDEX: 36.03 KG/M2 | HEIGHT: 72 IN | DIASTOLIC BLOOD PRESSURE: 70 MMHG | WEIGHT: 266 LBS | HEART RATE: 55 BPM | OXYGEN SATURATION: 92 %

## 2024-07-18 DIAGNOSIS — I10 ESSENTIAL HYPERTENSION: ICD-10-CM

## 2024-07-18 DIAGNOSIS — F17.201 MILD TOBACCO ABUSE IN SUSTAINED REMISSION: ICD-10-CM

## 2024-07-18 DIAGNOSIS — I48.11 LONGSTANDING PERSISTENT ATRIAL FIBRILLATION (HCC): ICD-10-CM

## 2024-07-18 DIAGNOSIS — Z95.0 CARDIAC RESYNCHRONIZATION THERAPY PACEMAKER (CRT-P) IN PLACE: ICD-10-CM

## 2024-07-18 DIAGNOSIS — I25.10 CORONARY ARTERY DISEASE INVOLVING NATIVE CORONARY ARTERY OF NATIVE HEART WITHOUT ANGINA PECTORIS: Primary | ICD-10-CM

## 2024-07-18 DIAGNOSIS — I50.22 CHRONIC SYSTOLIC CHF (CONGESTIVE HEART FAILURE) (HCC): ICD-10-CM

## 2024-07-18 DIAGNOSIS — R53.82 CHRONIC FATIGUE: ICD-10-CM

## 2024-07-18 PROCEDURE — 3075F SYST BP GE 130 - 139MM HG: CPT | Performed by: INTERNAL MEDICINE

## 2024-07-18 PROCEDURE — 1036F TOBACCO NON-USER: CPT | Performed by: INTERNAL MEDICINE

## 2024-07-18 PROCEDURE — G8417 CALC BMI ABV UP PARAM F/U: HCPCS | Performed by: INTERNAL MEDICINE

## 2024-07-18 PROCEDURE — 3078F DIAST BP <80 MM HG: CPT | Performed by: INTERNAL MEDICINE

## 2024-07-18 PROCEDURE — G8427 DOCREV CUR MEDS BY ELIG CLIN: HCPCS | Performed by: INTERNAL MEDICINE

## 2024-07-18 PROCEDURE — 1123F ACP DISCUSS/DSCN MKR DOCD: CPT | Performed by: INTERNAL MEDICINE

## 2024-07-18 PROCEDURE — 99214 OFFICE O/P EST MOD 30 MIN: CPT | Performed by: INTERNAL MEDICINE

## 2024-07-18 RX ORDER — EZETIMIBE 10 MG/1
10 TABLET ORAL DAILY
Qty: 90 TABLET | Refills: 3 | Status: SHIPPED | OUTPATIENT
Start: 2024-07-18

## 2024-07-18 RX ORDER — METOPROLOL SUCCINATE 25 MG/1
25 TABLET, EXTENDED RELEASE ORAL DAILY
Qty: 90 TABLET | Refills: 3 | Status: SHIPPED | OUTPATIENT
Start: 2024-07-18

## 2024-07-18 NOTE — TELEPHONE ENCOUNTER
MICHELE would like the echo test that Dr. Samuel ordered moved up sooner than 9/2024. Patient is willing to go to [facility].     MICHELE would like to know when it is scheduled

## 2024-07-18 NOTE — PATIENT INSTRUCTIONS
Medications reviewed in office. Medications refilled as warranted  Labs reviewed in epic and discussed with patient.  It is okay to trial off the valsartan to see if this is what is increasing his weakness.

## 2024-07-24 ENCOUNTER — HOSPITAL ENCOUNTER (OUTPATIENT)
Age: 84
Discharge: HOME | End: 2024-07-26
Attending: INTERNAL MEDICINE
Payer: MEDICARE

## 2024-07-24 ENCOUNTER — HOSPITAL ENCOUNTER (OUTPATIENT)
Dept: NUCLEAR MEDICINE | Age: 84
Discharge: HOME | End: 2024-07-24
Attending: INTERNAL MEDICINE
Payer: MEDICARE

## 2024-07-24 DIAGNOSIS — I25.10 CORONARY ARTERY DISEASE INVOLVING NATIVE CORONARY ARTERY OF NATIVE HEART WITHOUT ANGINA PECTORIS: ICD-10-CM

## 2024-07-24 DIAGNOSIS — I25.10 CORONARY ARTERY DISEASE INVOLVING NATIVE HEART WITHOUT ANGINA PECTORIS, UNSPECIFIED VESSEL OR LESION TYPE: ICD-10-CM

## 2024-07-24 LAB
NUC STRESS EJECTION FRACTION: 33 %
NUC STRESS LV EDV: 220 ML (ref 67–155)
NUC STRESS LV ESV: 147 ML (ref 22–58)
NUC STRESS LV MASS: 221 G
STRESS BASELINE DIAS BP: 90 MMHG
STRESS BASELINE HR: 67 BPM
STRESS BASELINE SYS BP: 129 MMHG
STRESS ESTIMATED WORKLOAD: 1 METS
STRESS PEAK DIAS BP: 91 MMHG
STRESS PEAK SYS BP: 168 MMHG
STRESS PERCENT HR ACHIEVED: 70 %
STRESS POST PEAK HR: 97 BPM
STRESS RATE PRESSURE PRODUCT: ABNORMAL BPM*MMHG
STRESS TARGET HR: 139 BPM
TID: 1.28

## 2024-07-24 PROCEDURE — 93017 CV STRESS TEST TRACING ONLY: CPT

## 2024-07-24 PROCEDURE — 6360000002 HC RX W HCPCS: Performed by: INTERNAL MEDICINE

## 2024-07-24 PROCEDURE — 78452 HT MUSCLE IMAGE SPECT MULT: CPT | Performed by: INTERNAL MEDICINE

## 2024-07-24 PROCEDURE — 93016 CV STRESS TEST SUPVJ ONLY: CPT | Performed by: INTERNAL MEDICINE

## 2024-07-24 PROCEDURE — 3430000000 HC RX DIAGNOSTIC RADIOPHARMACEUTICAL: Performed by: INTERNAL MEDICINE

## 2024-07-24 PROCEDURE — 93018 CV STRESS TEST I&R ONLY: CPT | Performed by: INTERNAL MEDICINE

## 2024-07-24 PROCEDURE — A9502 TC99M TETROFOSMIN: HCPCS | Performed by: INTERNAL MEDICINE

## 2024-07-24 PROCEDURE — 78452 HT MUSCLE IMAGE SPECT MULT: CPT

## 2024-07-24 RX ORDER — REGADENOSON 0.08 MG/ML
0.4 INJECTION, SOLUTION INTRAVENOUS
Status: COMPLETED | OUTPATIENT
Start: 2024-07-24 | End: 2024-07-24

## 2024-07-24 RX ADMIN — TETROFOSMIN 11.6 MILLICURIE: 1.38 INJECTION, POWDER, LYOPHILIZED, FOR SOLUTION INTRAVENOUS at 09:57

## 2024-07-24 RX ADMIN — REGADENOSON 0.4 MG: 0.08 INJECTION, SOLUTION INTRAVENOUS at 11:22

## 2024-07-24 RX ADMIN — TETROFOSMIN 30.7 MILLICURIE: 1.38 INJECTION, POWDER, LYOPHILIZED, FOR SOLUTION INTRAVENOUS at 11:22

## 2024-07-24 NOTE — DISCHARGE INSTRUCTIONS
Follow up with the ordering physician for the final stress test results.  Rest 2 hours after completion of test.   Resume diet.   Resume medications.  If you have chest pain or any concerns, contact your physician. If you are unable to contact your physician, go to the nearest emergency room or call 9-1-1.   
no

## 2024-07-26 NOTE — PROGRESS NOTES
[facility]   Cardiovascular Evaluation          PCP:  Petr Grijalva MD    Reason for evaluation:   Follow-up for fatigue, shortness of breath, CAD, ischemic cardiomyopathy     Subjective:     William Krishna is an 83 y.o. male patient with a history of CAD s/p multiple PCI's, ischemic cardiomyopathy, atrial fibrillation s/p Watchman device due to history of severe GI bleed, symptomatic bradycardia s/p Bi-V pacemaker, mild aortic stenosis, renal cell carcinoma s/p partial left nephrectomy, Staargardt's eye disease, essential hypertension, hyperlipidemia, JAIDEN, and obesity who presents for follow-up for fatigue and shortness of breath.    Following the patient's initial visit on 7/24/24, he had a pharmacologic nuclear SPECT stress test that showed a small area of inferoapical/apical ischemia.    Today, the patient reports that his fatigue has been stable.  He says that he still gets short of breath with relatively minimal activity, such as when taking a shower.  He denies any chest pain.  He has issues with his balance and has had multiple falls.  He says that he slipped and fell 2-3 days ago.  He also says that he feels lightheaded shortly after standing.      Past Medical History:   has a past medical history of Arthritis, CAD (coronary artery disease), Diabetes mellitus (HCC), Hearing decreased, Hyperlipidemia, Hypertension, and Sneezing.    Surgical History:   has a past surgical history that includes Cardiac surgery; eye surgery (cataracts both eyes); cyst removal (02/10/2012); and Tooth Extraction (07/16/2024).     Social History:   reports that he quit smoking about 53 years ago. His smoking use included cigarettes. He has never used smokeless tobacco. He reports current alcohol use. He reports that he does not use drugs.     Family History:  family history is not on file.    Home Medications:  Were reviewed and are listed in nursing record and/or below  Prior to Admission

## 2024-07-29 ENCOUNTER — OFFICE VISIT (OUTPATIENT)
Dept: CARDIOLOGY CLINIC | Age: 84
End: 2024-07-29
Payer: MEDICARE

## 2024-07-29 VITALS
HEIGHT: 72 IN | OXYGEN SATURATION: 96 % | SYSTOLIC BLOOD PRESSURE: 116 MMHG | DIASTOLIC BLOOD PRESSURE: 68 MMHG | BODY MASS INDEX: 36.07 KG/M2 | HEART RATE: 74 BPM

## 2024-07-29 DIAGNOSIS — I25.119 CORONARY ARTERY DISEASE INVOLVING NATIVE CORONARY ARTERY OF NATIVE HEART WITH ANGINA PECTORIS (HCC): Primary | ICD-10-CM

## 2024-07-29 DIAGNOSIS — R42 DYSEQUILIBRIUM: ICD-10-CM

## 2024-07-29 DIAGNOSIS — I10 ESSENTIAL HYPERTENSION: ICD-10-CM

## 2024-07-29 DIAGNOSIS — I48.91 ATRIAL FIBRILLATION, UNSPECIFIED TYPE (HCC): ICD-10-CM

## 2024-07-29 DIAGNOSIS — Z87.19 HISTORY OF GI BLEED: ICD-10-CM

## 2024-07-29 DIAGNOSIS — R29.6 RECURRENT FALLS: ICD-10-CM

## 2024-07-29 DIAGNOSIS — G47.33 OSA (OBSTRUCTIVE SLEEP APNEA): ICD-10-CM

## 2024-07-29 DIAGNOSIS — Z79.899 MEDICATION MANAGEMENT: ICD-10-CM

## 2024-07-29 DIAGNOSIS — K62.5 RECTAL BLEEDING: ICD-10-CM

## 2024-07-29 DIAGNOSIS — I25.5 ISCHEMIC CARDIOMYOPATHY: ICD-10-CM

## 2024-07-29 PROCEDURE — 3078F DIAST BP <80 MM HG: CPT | Performed by: INTERNAL MEDICINE

## 2024-07-29 PROCEDURE — G8427 DOCREV CUR MEDS BY ELIG CLIN: HCPCS | Performed by: INTERNAL MEDICINE

## 2024-07-29 PROCEDURE — G8417 CALC BMI ABV UP PARAM F/U: HCPCS | Performed by: INTERNAL MEDICINE

## 2024-07-29 PROCEDURE — 1036F TOBACCO NON-USER: CPT | Performed by: INTERNAL MEDICINE

## 2024-07-29 PROCEDURE — 3074F SYST BP LT 130 MM HG: CPT | Performed by: INTERNAL MEDICINE

## 2024-07-29 PROCEDURE — 99214 OFFICE O/P EST MOD 30 MIN: CPT | Performed by: INTERNAL MEDICINE

## 2024-07-29 PROCEDURE — 1123F ACP DISCUSS/DSCN MKR DOCD: CPT | Performed by: INTERNAL MEDICINE

## 2024-07-29 NOTE — PATIENT INSTRUCTIONS
Continue taking Aspirin 81 mg tablet, Zetia 10 mg daily, Repatha 140 mg subQ q 14 days, Metoprolol succinate 25 mg tablet daily, Valsartan 20 mg daily.  2.  Start taking spironolactone 12.5 mg daily  3. BMP and magnesium in 1 week  4. Referral to GI placed to evaluate for bleeding risk prior to coronary angiogram.  5. Referral placed to neurology due to recent balance issues and falls.  6. Follow up with me after testin

## 2024-07-30 ENCOUNTER — TELEPHONE (OUTPATIENT)
Dept: CARDIOLOGY CLINIC | Age: 84
End: 2024-07-30

## 2024-07-30 RX ORDER — SPIRONOLACTONE 25 MG/1
12.5 TABLET ORAL DAILY
Qty: 90 TABLET | Refills: 1 | Status: SHIPPED | OUTPATIENT
Start: 2024-07-30

## 2024-07-30 NOTE — TELEPHONE ENCOUNTER
Milagro stated that they need a new prescription for Spirolactone 12.5mg to be called into   [id number] - [place], [place] - [address] - P [phone] - F [phone] [[id number]]     Last ov 0729/24 [facility]    Please call once sent to the pharmacy.

## 2024-08-09 PROCEDURE — 93296 REM INTERROG EVL PM/IDS: CPT | Performed by: INTERNAL MEDICINE

## 2024-08-09 PROCEDURE — 93294 REM INTERROG EVL PM/LDLS PM: CPT | Performed by: INTERNAL MEDICINE

## 2024-08-12 ENCOUNTER — HOSPITAL ENCOUNTER (OUTPATIENT)
Dept: CT IMAGING | Age: 84
Discharge: HOME | End: 2024-08-12
Attending: ORTHOPAEDIC SURGERY
Payer: MEDICARE

## 2024-08-12 DIAGNOSIS — M25.561 RIGHT KNEE PAIN, UNSPECIFIED CHRONICITY: ICD-10-CM

## 2024-08-12 PROCEDURE — 73700 CT LOWER EXTREMITY W/O DYE: CPT

## 2024-08-13 ENCOUNTER — HOSPITAL ENCOUNTER (OUTPATIENT)
Age: 84
Discharge: HOME | End: 2024-08-13
Payer: MEDICARE

## 2024-08-13 DIAGNOSIS — Z79.899 MEDICATION MANAGEMENT: ICD-10-CM

## 2024-08-13 LAB
ANION GAP SERPL CALCULATED.3IONS-SCNC: 11 MMOL/L (ref 3–16)
BUN SERPL-MCNC: 11 MG/DL (ref 7–20)
CALCIUM SERPL-MCNC: 9.6 MG/DL (ref 8.3–10.6)
CHLORIDE SERPL-SCNC: 101 MMOL/L (ref 99–110)
CO2 SERPL-SCNC: 29 MMOL/L (ref 21–32)
CREAT SERPL-MCNC: 1 MG/DL (ref 0.8–1.3)
GFR SERPLBLD CREATININE-BSD FMLA CKD-EPI: 75 ML/MIN/{1.73_M2}
GLUCOSE SERPL-MCNC: 112 MG/DL (ref 70–99)
MAGNESIUM SERPL-MCNC: 1.99 MG/DL (ref 1.8–2.4)
POTASSIUM SERPL-SCNC: 3.9 MMOL/L (ref 3.5–5.1)
SODIUM SERPL-SCNC: 141 MMOL/L (ref 136–145)

## 2024-08-13 PROCEDURE — 36415 COLL VENOUS BLD VENIPUNCTURE: CPT

## 2024-08-13 PROCEDURE — 83735 ASSAY OF MAGNESIUM: CPT

## 2024-08-13 PROCEDURE — 80048 BASIC METABOLIC PNL TOTAL CA: CPT

## 2024-08-16 ENCOUNTER — TELEPHONE (OUTPATIENT)
Dept: INTERNAL MEDICINE CLINIC | Age: 84
End: 2024-08-16

## 2024-08-16 DIAGNOSIS — E11.42 TYPE 2 DIABETES MELLITUS WITH DIABETIC POLYNEUROPATHY, WITHOUT LONG-TERM CURRENT USE OF INSULIN (HCC): Primary | ICD-10-CM

## 2024-08-16 RX ORDER — DULAGLUTIDE 0.75 MG/.5ML
INJECTION, SOLUTION SUBCUTANEOUS
Qty: 2 ML | Refills: 2 | Status: SHIPPED | OUTPATIENT
Start: 2024-08-16

## 2024-08-16 NOTE — TELEPHONE ENCOUNTER
----- Message from Dr. Petr Grijalva MD sent at 8/16/2024 10:24 AM EDT -----  Contact: [phone]  Trulicity 0.75 weekly x 1 month , then 1.5 mg weekly   Can cause mild nausea  ----- Message -----  From: Marcy Mann MA  Sent: 8/16/2024  10:20 AM EDT  To: Petr Grijalva MD    Patients wife called and states that you told her that you would be willing to switch the patient to Trulicity, since they are no longer making the Victoza. She wants to know if you are still wanting to do that? They are starting to get low on the generic medication that they paid out of pocket for. Please advise.     Helena Gonzalez

## 2024-09-04 ENCOUNTER — TELEPHONE (OUTPATIENT)
Dept: CARDIOLOGY CLINIC | Age: 84
End: 2024-09-04

## 2024-09-10 RX ORDER — TAMSULOSIN HYDROCHLORIDE 0.4 MG/1
0.4 CAPSULE ORAL DAILY
Qty: 90 CAPSULE | Refills: 0 | Status: SHIPPED | OUTPATIENT
Start: 2024-09-10

## 2024-09-26 RX ORDER — TESTOSTERONE 25 MG/2.5G
5 GEL TRANSDERMAL DAILY
COMMUNITY

## 2024-09-30 ENCOUNTER — ANESTHESIA EVENT (OUTPATIENT)
Dept: ENDOSCOPY | Age: 84
End: 2024-09-30
Payer: MEDICARE

## 2024-10-01 ENCOUNTER — HOSPITAL ENCOUNTER (OUTPATIENT)
Age: 84
Setting detail: OUTPATIENT SURGERY
Discharge: HOME | End: 2024-10-01
Attending: INTERNAL MEDICINE | Admitting: INTERNAL MEDICINE
Payer: MEDICARE

## 2024-10-01 ENCOUNTER — ANESTHESIA (OUTPATIENT)
Dept: ENDOSCOPY | Age: 84
End: 2024-10-01
Payer: MEDICARE

## 2024-10-01 VITALS
RESPIRATION RATE: 20 BRPM | HEIGHT: 71 IN | DIASTOLIC BLOOD PRESSURE: 53 MMHG | TEMPERATURE: 98.7 F | HEART RATE: 60 BPM | SYSTOLIC BLOOD PRESSURE: 124 MMHG | BODY MASS INDEX: 36.68 KG/M2 | OXYGEN SATURATION: 95 % | WEIGHT: 262 LBS

## 2024-10-01 DIAGNOSIS — K57.90 DIVERTICULAR DISEASE: ICD-10-CM

## 2024-10-01 DIAGNOSIS — Z87.19 HISTORY OF GASTROINTESTINAL HEMORRHAGE: ICD-10-CM

## 2024-10-01 LAB
GLUCOSE BLD-MCNC: 126 MG/DL (ref 70–99)
GLUCOSE BLD-MCNC: 140 MG/DL (ref 70–99)
PERFORMED ON: ABNORMAL
PERFORMED ON: ABNORMAL

## 2024-10-01 PROCEDURE — 7100000010 HC PHASE II RECOVERY - FIRST 15 MIN: Performed by: INTERNAL MEDICINE

## 2024-10-01 PROCEDURE — 2500000003 HC RX 250 WO HCPCS: Performed by: NURSE ANESTHETIST, CERTIFIED REGISTERED

## 2024-10-01 PROCEDURE — 3609012400 HC EGD TRANSORAL BIOPSY SINGLE/MULTIPLE: Performed by: INTERNAL MEDICINE

## 2024-10-01 PROCEDURE — 3609027000 HC COLONOSCOPY: Performed by: INTERNAL MEDICINE

## 2024-10-01 PROCEDURE — 3609017100 HC EGD: Performed by: INTERNAL MEDICINE

## 2024-10-01 PROCEDURE — 2580000003 HC RX 258: Performed by: NURSE ANESTHETIST, CERTIFIED REGISTERED

## 2024-10-01 PROCEDURE — 2580000003 HC RX 258: Performed by: STUDENT IN AN ORGANIZED HEALTH CARE EDUCATION/TRAINING PROGRAM

## 2024-10-01 PROCEDURE — 7100000011 HC PHASE II RECOVERY - ADDTL 15 MIN: Performed by: INTERNAL MEDICINE

## 2024-10-01 PROCEDURE — 6360000002 HC RX W HCPCS: Performed by: NURSE ANESTHETIST, CERTIFIED REGISTERED

## 2024-10-01 PROCEDURE — 3700000000 HC ANESTHESIA ATTENDED CARE: Performed by: INTERNAL MEDICINE

## 2024-10-01 PROCEDURE — 2709999900 HC NON-CHARGEABLE SUPPLY: Performed by: INTERNAL MEDICINE

## 2024-10-01 PROCEDURE — 3700000001 HC ADD 15 MINUTES (ANESTHESIA): Performed by: INTERNAL MEDICINE

## 2024-10-01 PROCEDURE — 3609010600 HC COLONOSCOPY POLYPECTOMY SNARE/COLD BIOPSY: Performed by: INTERNAL MEDICINE

## 2024-10-01 PROCEDURE — 88305 TISSUE EXAM BY PATHOLOGIST: CPT

## 2024-10-01 RX ORDER — SODIUM CHLORIDE 0.9 % (FLUSH) 0.9 %
5-40 SYRINGE (ML) INJECTION PRN
Status: DISCONTINUED | OUTPATIENT
Start: 2024-10-01 | End: 2024-10-01 | Stop reason: HOSPADM

## 2024-10-01 RX ORDER — LIDOCAINE HYDROCHLORIDE 10 MG/ML
0.3 INJECTION, SOLUTION EPIDURAL; INFILTRATION; INTRACAUDAL; PERINEURAL
Status: DISCONTINUED | OUTPATIENT
Start: 2024-10-01 | End: 2024-10-01 | Stop reason: HOSPADM

## 2024-10-01 RX ORDER — SODIUM CHLORIDE 0.9 % (FLUSH) 0.9 %
5-40 SYRINGE (ML) INJECTION EVERY 12 HOURS SCHEDULED
Status: DISCONTINUED | OUTPATIENT
Start: 2024-10-01 | End: 2024-10-01 | Stop reason: HOSPADM

## 2024-10-01 RX ORDER — PROPOFOL 10 MG/ML
INJECTION, EMULSION INTRAVENOUS
Status: DISCONTINUED | OUTPATIENT
Start: 2024-10-01 | End: 2024-10-01 | Stop reason: SDUPTHER

## 2024-10-01 RX ORDER — SODIUM CHLORIDE, SODIUM LACTATE, POTASSIUM CHLORIDE, CALCIUM CHLORIDE 600; 310; 30; 20 MG/100ML; MG/100ML; MG/100ML; MG/100ML
INJECTION, SOLUTION INTRAVENOUS CONTINUOUS
Status: DISCONTINUED | OUTPATIENT
Start: 2024-10-01 | End: 2024-10-01 | Stop reason: HOSPADM

## 2024-10-01 RX ORDER — LIDOCAINE HYDROCHLORIDE 20 MG/ML
INJECTION, SOLUTION EPIDURAL; INFILTRATION; INTRACAUDAL; PERINEURAL
Status: DISCONTINUED | OUTPATIENT
Start: 2024-10-01 | End: 2024-10-01 | Stop reason: SDUPTHER

## 2024-10-01 RX ORDER — SODIUM CHLORIDE 9 MG/ML
INJECTION, SOLUTION INTRAVENOUS PRN
Status: DISCONTINUED | OUTPATIENT
Start: 2024-10-01 | End: 2024-10-01 | Stop reason: HOSPADM

## 2024-10-01 RX ORDER — SODIUM CHLORIDE, SODIUM LACTATE, POTASSIUM CHLORIDE, CALCIUM CHLORIDE 600; 310; 30; 20 MG/100ML; MG/100ML; MG/100ML; MG/100ML
INJECTION, SOLUTION INTRAVENOUS
Status: DISCONTINUED | OUTPATIENT
Start: 2024-10-01 | End: 2024-10-01 | Stop reason: SDUPTHER

## 2024-10-01 RX ADMIN — PROPOFOL 50 MG: 10 INJECTION, EMULSION INTRAVENOUS at 10:46

## 2024-10-01 RX ADMIN — PROPOFOL 50 MG: 10 INJECTION, EMULSION INTRAVENOUS at 10:32

## 2024-10-01 RX ADMIN — PROPOFOL 50 MG: 10 INJECTION, EMULSION INTRAVENOUS at 10:28

## 2024-10-01 RX ADMIN — PROPOFOL 100 MG: 10 INJECTION, EMULSION INTRAVENOUS at 10:25

## 2024-10-01 RX ADMIN — SODIUM CHLORIDE, POTASSIUM CHLORIDE, SODIUM LACTATE AND CALCIUM CHLORIDE: 600; 310; 30; 20 INJECTION, SOLUTION INTRAVENOUS at 10:18

## 2024-10-01 RX ADMIN — PROPOFOL 50 MG: 10 INJECTION, EMULSION INTRAVENOUS at 10:36

## 2024-10-01 RX ADMIN — LIDOCAINE HYDROCHLORIDE 100 MG: 20 INJECTION, SOLUTION EPIDURAL; INFILTRATION; INTRACAUDAL at 10:25

## 2024-10-01 RX ADMIN — PROPOFOL 50 MG: 10 INJECTION, EMULSION INTRAVENOUS at 10:40

## 2024-10-01 RX ADMIN — SODIUM CHLORIDE, SODIUM LACTATE, POTASSIUM CHLORIDE, AND CALCIUM CHLORIDE: .6; .31; .03; .02 INJECTION, SOLUTION INTRAVENOUS at 10:20

## 2024-10-01 ASSESSMENT — PAIN SCALES - GENERAL
PAINLEVEL_OUTOF10: 0
PAINLEVEL_OUTOF10: 0

## 2024-10-01 ASSESSMENT — ENCOUNTER SYMPTOMS: SHORTNESS OF BREATH: 1

## 2024-10-01 ASSESSMENT — PAIN - FUNCTIONAL ASSESSMENT: PAIN_FUNCTIONAL_ASSESSMENT: 0-10

## 2024-10-01 NOTE — DISCHARGE INSTRUCTIONS
PATIENT INSTRUCTIONS  POST-SEDATION        IMMEDIATELY FOLLOWING PROCEDURE:    Do not drive or operate machinery for the first twenty four hours after surgery.     Do not make any important decisions for twenty four hours after surgery or while taking narcotic pain medications or sedatives.     You should NOT BE LEFT UNATTENDED OR ALONE. A responsible adult should be with you for the rest of the day of your procedure and also during the night for your protection and safety.    You may experience some light headedness. Rest at home with activity as tolerated. You may not need to go to bed, but it is important to rest for the next 24 hours. You should not engage in athletic sports such as basketball, volleyball, jogging, skating, or activities requiring refined motor skills for 24 hours.   If you develop intractable nausea and vomiting or a severe headache please notify your doctor immediately.   You are not expected to have any fever, but if you feel warm, take your temperature. If you have a fever 101 degrees or higher, call your doctor.     If you have had an Endoscopy:   *Eat lightly for your first meal and gradually resume your normal / prescribed diet.    *If you have had a colonoscopy, do not expect a normal bowel movement for approximately three days due to the cleansing of the large intestine prior to colonoscopy.    ONCE YOU ARE HOME, IF YOU SHOULD HAVE:  Difficulty in breathing, persistent nausea or vomiting, bleeding you feel is excessive, or pain that is unusual, increased abdominal bloating, or any swelling, fever / chills, call your physician. If you cannot contact your physician, but feel that your signs and symptoms need a physician's attention, go to the Emergency Department.      FOLLOW-UP:    Please follow up with your Primary Care Provider as scheduled or needed.    Call Yani Singh MD if there are any GI concerns. [phone]    Repeat Colonoscopy ***    You may be receiving a follow up

## 2024-10-01 NOTE — PERIOP NOTE
Pt received from endo/OR suite - accompanied by procedure Rn and CRNA   Pt  - emerging from anesthesia - VSS   Respirations clear / unlabored  Pt without chest pain / NO PONV  Hear rate regular   Resting comfortably        500      ML of Lactated ringers infused on arrival to pacu

## 2024-10-01 NOTE — ANESTHESIA PRE PROCEDURE
Ventricle: Right ventricle size is normal. Lead present in the right ventricle. Mildly reduced systolic function.    Aortic Valve: Trileaflet valve. Moderately thickened cusps. Moderately calcified cusps. Mild regurgitation. Mild to moderate stenosis of the aortic valve. AV mean gradient is 20 mmHg. AV peak velocity is 3.0 m/s. AV area by continuity VTI is 1.6 cm2.    Mitral Valve: Mild regurgitation.    Tricuspid Valve: Trace regurgitation. The estimated RVSP is 30 mmHg.    Pericardium: No pericardial effusion.    IVC/SVC: IVC diameter is less than or equal to 21 mm and decreases greater than 50% during inspiration; therefore the estimated right atrial pressure is normal (~3 mmHg). IVC size is normal.    Nuclear Stress Test 7/24/24    Stress Combined Conclusion: The study is most consistent with myocardial ischemia. Findings suggest a moderate risk of cardiac events.    Stress Function: Post-stress ejection fraction is 33%. Stress end diastolic volume: 220 mL. Stress end systolic volume: 147 mL. LV mass: 221.0 g.    Perfusion Comments: LV perfusion is abnormal.    Perfusion Defect: There is a moderate severity left ventricular stress perfusion defect that is small in size present in the inferoapical and apex segment(s) that is partially reversible. The defect appears to be ischemia.    Perfusion Conclusion: TID ratio is 1.28.    Pacemaker Interrogation  [employer] CRT  Battery 7.5 years remaining  DDDR  AP 50%, RV Pacing 99%, LV Pacing 100%, AT/AF burden 1%     Anesthesia Plan      MAC     ASA 3     (Risks, benefits, and alternatives to MAC anesthesia discussed with patient. Questions answered. Patient willing to proceed. )  Induction: intravenous.      Anesthetic plan and risks discussed with patient.      Plan discussed with CRNA.                    Robson Mckinney MD   10/1/2024

## 2024-10-01 NOTE — PROGRESS NOTES
Date and time of surgery :  10/1/24            Arrival Time:  0900     Bring Picture ID and insurance card.  Please wear simple, loose fitting clothing to the hospital.   Do not bring valuables (money, credit cards, checkbooks, etc.)   Do not wear any makeup (including  eye makeup) and no nail polish or artificial nails on your fingers or toes.  DO NOT wear any jewelry or piercings on day of surgery.  All body piercing jewelry must be removed.  If you have dentures, they will be removed before going to the OR; we will provide you a container.  If you wear contact lenses or glasses, they will be removed; please bring a case for them.  Shower the evening before or morning of surgery with antibacterial soap.  Nothing to eat or drink after midnight the day before surgery.   You may brush your teeth and gargle the morning of surgery.  DO NOT SWALLOW WATER.   Do not take any morning meds the day of your surgery.  Aspirin, Ibuprofen, Advil, Naproxen, Vitamin E and other Anti-inflammatory products and supplements should be stopped for 5 -7days before surgery or as directed by your physician.  Do not smoke or drink any alcoholic beverages 24 hours prior to surgery.  This includes NA Beer. Refrain from the usage of any recreational drugs, including non-prescribed prescription drugs.   You MUST plan for a responsible adult to stay on site while you are here and take you home after your surgery. You will not be allowed to leave alone or drive yourself home. It is strongly suggested someone stay with you the first 24 hrs. Your surgery will be cancelled if you do not have a ride home.  If you  have a Living Will and Durable Power of  for Healthcare, please bring in a copy.  Notify your Surgeon if you develop any illness between now and time of surgery. Cough, cold, fever, sore throat, nausea, vomiting, etc.  Please notify your surgeon if you experience dizziness, shortness of breath or blurred vision between now & the 
Pre and post operative expectations and routines explained to patient, verbalized understanding.  
Pt advanced from lying to sitting at side of bed without adverse events: VSS/RESP WNL  abd assessment unchanged- denies pain /tolerating oral liquids without ponv    Physician  out to see pt and family and discuss exam results with family /significant other/pts family verbalized understanding of postop activity restrictions -pt/family denies additional questions    Reviewed discharge instructions with family  And pt  /verbalized understanding - all questions /concerns addressed  Educational materials given to pt and explained- all questions answered    Pain level =0  
   _______SCD                      ______JAZMIN GARCES    COVID + _______DATE    ___OK per Anesthesia   ____OK per Surgeon

## 2024-10-01 NOTE — H&P
Gastroenterology Note             Pre-operative History and Physical    Patient: William Krishna  : 1940  CSN:     History Obtained From:  patient and/or guardian.     HISTORY OF PRESENT ILLNESS:    The patient is a 84 y.o. male  here for EGD/colonoscopy.     Past Medical History:    Past Medical History:   Diagnosis Date    Arthritis     CAD (coronary artery disease)     MI with stents and angioplasty    Diabetes mellitus (HCC)     Hearing decreased     Hyperlipidemia     Hypertension     Pacemaker     Presence of Watchman left atrial appendage closure device     Sneezing     when pt gets \"chilled\"    Thyroid disease      Past Surgical History:    Past Surgical History:   Procedure Laterality Date    CARDIAC SURGERY      stents  and angioplasty    CYST REMOVAL  02/10/2012    scalp    EYE SURGERY  cataracts both eyes    TOOTH EXTRACTION  2024     Medications Prior to Admission:   No current facility-administered medications on file prior to encounter.     Current Outpatient Medications on File Prior to Encounter   Medication Sig Dispense Refill    testosterone (ANDROGEL) 25 MG/2.5GM (1%) GEL 1 % gel Apply 5 g topically daily.      tamsulosin (FLOMAX) 0.4 MG capsule TAKE 1 CAPSULE DAILY 90 capsule 0    dulaglutide (TRULICITY) 0.75 MG/0.5ML SOPN SC injection Take 0.75 mg weekly for 1 month, then 1.5 mg weekly 2 mL 2    spironolactone (ALDACTONE) 25 MG tablet Take 0.5 tablets by mouth daily 90 tablet 1    ezetimibe (ZETIA) 10 MG tablet Take 1 tablet by mouth daily 90 tablet 3    metoprolol succinate (TOPROL XL) 25 MG extended release tablet Take 1 tablet by mouth daily 90 tablet 3    REPATHA SURECLICK 140 MG/ML SOAJ ADMINISTER 1 ML UNDER THE SKIN EVERY 14 DAYS 6 mL 3    MAGNESIUM PO Take by mouth      levothyroxine (SYNTHROID) 50 MCG tablet Take 1 tablet by mouth daily 90 tablet 1    aspirin 81 MG EC tablet Take 1 tablet by mouth daily      ACCU-CHEK ELBERT PLUS strip       Insulin Pen Needle (BD

## 2024-10-01 NOTE — ANESTHESIA POSTPROCEDURE EVALUATION
Department of Anesthesiology  Postprocedure Note    Patient: William Krishna  MRN: [id number]  YOB: 1940  Date of evaluation: 10/1/2024    Procedure Summary       Date: 10/01/24 Room / Location: [place] [address] / [facility]    Anesthesia Start: 1020 Anesthesia Stop: 1107    Procedures:       COLONOSCOPY      ESOPHAGOGASTRODUODENOSCOPY      ESOPHAGOGASTRODUODENOSCOPY BIOPSY      COLONOSCOPY POLYPECTOMY SNARE/BIOPSY Diagnosis:       History of gastrointestinal hemorrhage      Diverticular disease      (History of gastrointestinal hemorrhage [Z87.19])      (Diverticular disease [K57.90])    Surgeons: Yani Gomes MD Responsible Provider: Robson Mckinney MD    Anesthesia Type: MAC ASA Status: 3            Anesthesia Type: No value filed.    Christianne Phase I: Christianne Score: 10    Christianne Phase II: Christianne Score: 10    Anesthesia Post Evaluation    Comments: Postoperative Anesthesia Note    Name:    William Krishna  MRN:      [id number]    Patient Vitals in the past 12 hrs:  10/01/24 1127, BP:(!) 124/53, Pulse:60, Resp:20, SpO2:95 %  10/01/24 1119, BP:(!) 112/53, Pulse:60, Resp:20, SpO2:95 %  10/01/24 1104, BP:(!) 116/51, Pulse:57, Resp:18, SpO2:94 %  10/01/24 0948, BP:133/80, Temp:98.7 °F (37.1 °C), Temp src:Oral, Pulse:60, Resp:16, SpO2:95 %, Height:1.803 m (5' 10.98\"), Weight:118.8 kg (262 lb)     LABS:    CBC  Lab Results       Component                Value               Date/Time                  WBC                      9.1                 03/04/2024 11:20 AM        HGB                      14.3                03/04/2024 11:20 AM        HCT                      41.1                03/04/2024 11:20 AM        PLT                      173                 03/04/2024 11:20 AM   RENAL  Lab Results       Component                Value               Date/Time                  NA                       141                 08/13/2024 03:01 PM        K                        3.9

## 2024-10-07 NOTE — PROGRESS NOTES
[facility]   Cardiovascular Evaluation          PCP:  Petr Grijalva MD    Reason for evaluation:   Follow-up for fatigue, shortness of breath, CAD, ischemic cardiomyopathy, abnormal nuclear SPECT stress test     Subjective:     William Krishna is an 84 y.o. male with a history of CAD s/p multiple PCI's, ischemic cardiomyopathy, atrial fibrillation s/p Watchman device due to history of severe GI bleed, symptomatic bradycardia s/p Bi-V pacemaker, mild aortic stenosis, renal cell carcinoma s/p partial left nephrectomy, Staargardt's eye disease, essential hypertension, hyperlipidemia, JAIDEN, and obesity who presents for follow-up.    Following the patient's initial visit on 7/10/24, he had a pharmacologic nuclear SPECT stress test that showed a small area of inferoapical/apical ischemia.  A TTE on 7/22/2024 showed an LVEF of 40-45% with hypokinesis of the basal inferior and basal inferolateral walls, mild LVH, grade 1 diastolic dysfunction, normal RV size with mildly reduced RV systolic function, mild aortic regurgitation, mild-moderate aortic stenosis with mean gradient of 20 mmHg and peak velocity of 3.0 m/s, mild mitral regurgitation, trace tricuspid regurgitation, and normal RVSP estimated at 30 mmHg.  Since his last visit, he was also evaluated by neurology and diagnosed with Parkinson's disease and started on Sinemet.  He also had an EGD and colonoscopy on 10/1/2024.  His EGD showed grade a esophagitis and evidence of gastritis.  Colonoscopy revealed one 3 mm polyp that was removed with a cold snare and two 3-6 mm polyps in the sigmoid: That were removed with a cold snare.  There was also noted to be diverticulosis.    Today, the patient reports that his tremors have improved since starting Sinemet.  He still has some shortness of breath and also fatigue with relatively minimal exertion.  He denies any chest pain, lightheadedness, dizziness, palpitations, or lower extremity

## 2024-10-16 ENCOUNTER — OFFICE VISIT (OUTPATIENT)
Dept: CARDIOLOGY CLINIC | Age: 84
End: 2024-10-16

## 2024-10-16 VITALS
DIASTOLIC BLOOD PRESSURE: 60 MMHG | HEART RATE: 76 BPM | HEIGHT: 71 IN | SYSTOLIC BLOOD PRESSURE: 114 MMHG | WEIGHT: 267.31 LBS | RESPIRATION RATE: 16 BRPM | OXYGEN SATURATION: 95 % | BODY MASS INDEX: 37.42 KG/M2

## 2024-10-16 DIAGNOSIS — R53.83 OTHER FATIGUE: ICD-10-CM

## 2024-10-16 DIAGNOSIS — G20.A1 PARKINSON'S DISEASE, UNSPECIFIED WHETHER DYSKINESIA PRESENT, UNSPECIFIED WHETHER MANIFESTATIONS FLUCTUATE (HCC): ICD-10-CM

## 2024-10-16 DIAGNOSIS — E78.2 MIXED HYPERLIPIDEMIA: ICD-10-CM

## 2024-10-16 DIAGNOSIS — I10 ESSENTIAL HYPERTENSION: ICD-10-CM

## 2024-10-16 DIAGNOSIS — R06.09 EXERTIONAL DYSPNEA: ICD-10-CM

## 2024-10-16 DIAGNOSIS — G47.33 OSA (OBSTRUCTIVE SLEEP APNEA): ICD-10-CM

## 2024-10-16 DIAGNOSIS — I25.10 CORONARY ARTERY DISEASE INVOLVING NATIVE CORONARY ARTERY OF NATIVE HEART, UNSPECIFIED WHETHER ANGINA PRESENT: Primary | ICD-10-CM

## 2024-10-16 DIAGNOSIS — I25.5 ISCHEMIC CARDIOMYOPATHY: ICD-10-CM

## 2024-10-16 DIAGNOSIS — I48.11 LONGSTANDING PERSISTENT ATRIAL FIBRILLATION (HCC): ICD-10-CM

## 2024-10-16 RX ORDER — VALSARTAN 40 MG/1
20 TABLET ORAL DAILY
Qty: 30 TABLET | Refills: 3
Start: 2024-10-16

## 2024-10-16 NOTE — PATIENT INSTRUCTIONS
1. The patient continues to report fatigue and has shortness of breath with relatively minimal exertion.  His recent nuclear SPECT stress test shows a small area of inferoapical/apical ischemia. Recommend medical management due to risks of procedure.   2. Continue aspirin 81 mg daily, Repatha 140 mg SC every 2 weeks, ezetimibe 10 mg daily, metoprolol succinate 25 mg daily, Spironolactone 12.5 mg daily,  and RESUME valsartan 20 mg daily.  3. Labs in 1 weeks   4. Follow up with Dr. Villagomez in 3 months

## 2024-10-23 ENCOUNTER — HOSPITAL ENCOUNTER (OUTPATIENT)
Age: 84
Discharge: HOME | End: 2024-10-23
Payer: MEDICARE

## 2024-10-23 DIAGNOSIS — I10 ESSENTIAL HYPERTENSION: ICD-10-CM

## 2024-10-23 DIAGNOSIS — E78.2 MIXED HYPERLIPIDEMIA: ICD-10-CM

## 2024-10-23 DIAGNOSIS — I48.11 LONGSTANDING PERSISTENT ATRIAL FIBRILLATION (HCC): ICD-10-CM

## 2024-10-23 DIAGNOSIS — I25.10 CORONARY ARTERY DISEASE INVOLVING NATIVE CORONARY ARTERY OF NATIVE HEART WITHOUT ANGINA PECTORIS: ICD-10-CM

## 2024-10-23 LAB
ANION GAP SERPL CALCULATED.3IONS-SCNC: 11 MMOL/L (ref 3–16)
BUN SERPL-MCNC: 14 MG/DL (ref 7–20)
CALCIUM SERPL-MCNC: 9.3 MG/DL (ref 8.3–10.6)
CHLORIDE SERPL-SCNC: 96 MMOL/L (ref 99–110)
CO2 SERPL-SCNC: 28 MMOL/L (ref 21–32)
CREAT SERPL-MCNC: 1 MG/DL (ref 0.8–1.3)
GFR SERPLBLD CREATININE-BSD FMLA CKD-EPI: 75 ML/MIN/{1.73_M2}
GLUCOSE SERPL-MCNC: 145 MG/DL (ref 70–99)
MAGNESIUM SERPL-MCNC: 1.92 MG/DL (ref 1.8–2.4)
POTASSIUM SERPL-SCNC: 3.8 MMOL/L (ref 3.5–5.1)
SODIUM SERPL-SCNC: 135 MMOL/L (ref 136–145)

## 2024-10-23 PROCEDURE — 80048 BASIC METABOLIC PNL TOTAL CA: CPT

## 2024-10-23 PROCEDURE — 83735 ASSAY OF MAGNESIUM: CPT

## 2024-10-30 SDOH — ECONOMIC STABILITY: INCOME INSECURITY: HOW HARD IS IT FOR YOU TO PAY FOR THE VERY BASICS LIKE FOOD, HOUSING, MEDICAL CARE, AND HEATING?: NOT HARD AT ALL

## 2024-10-30 SDOH — ECONOMIC STABILITY: FOOD INSECURITY: WITHIN THE PAST 12 MONTHS, THE FOOD YOU BOUGHT JUST DIDN'T LAST AND YOU DIDN'T HAVE MONEY TO GET MORE.: NEVER TRUE

## 2024-10-30 SDOH — HEALTH STABILITY: PHYSICAL HEALTH: ON AVERAGE, HOW MANY DAYS PER WEEK DO YOU ENGAGE IN MODERATE TO STRENUOUS EXERCISE (LIKE A BRISK WALK)?: 2 DAYS

## 2024-10-30 SDOH — ECONOMIC STABILITY: FOOD INSECURITY: WITHIN THE PAST 12 MONTHS, YOU WORRIED THAT YOUR FOOD WOULD RUN OUT BEFORE YOU GOT MONEY TO BUY MORE.: NEVER TRUE

## 2024-10-30 SDOH — HEALTH STABILITY: PHYSICAL HEALTH: ON AVERAGE, HOW MANY MINUTES DO YOU ENGAGE IN EXERCISE AT THIS LEVEL?: 60 MIN

## 2024-10-30 ASSESSMENT — LIFESTYLE VARIABLES
HOW OFTEN DO YOU HAVE A DRINK CONTAINING ALCOHOL: 4
HOW MANY STANDARD DRINKS CONTAINING ALCOHOL DO YOU HAVE ON A TYPICAL DAY: 1 OR 2
HOW MANY STANDARD DRINKS CONTAINING ALCOHOL DO YOU HAVE ON A TYPICAL DAY: 1
HOW OFTEN DO YOU HAVE A DRINK CONTAINING ALCOHOL: 2-3 TIMES A WEEK
HOW OFTEN DO YOU HAVE SIX OR MORE DRINKS ON ONE OCCASION: 1

## 2024-10-30 ASSESSMENT — PATIENT HEALTH QUESTIONNAIRE - PHQ9
SUM OF ALL RESPONSES TO PHQ QUESTIONS 1-9: 0
SUM OF ALL RESPONSES TO PHQ9 QUESTIONS 1 & 2: 0
SUM OF ALL RESPONSES TO PHQ QUESTIONS 1-9: 0
SUM OF ALL RESPONSES TO PHQ QUESTIONS 1-9: 0
1. LITTLE INTEREST OR PLEASURE IN DOING THINGS: NOT AT ALL
SUM OF ALL RESPONSES TO PHQ QUESTIONS 1-9: 0
2. FEELING DOWN, DEPRESSED OR HOPELESS: NOT AT ALL

## 2024-10-31 ENCOUNTER — OFFICE VISIT (OUTPATIENT)
Dept: INTERNAL MEDICINE CLINIC | Age: 84
End: 2024-10-31

## 2024-10-31 VITALS
HEART RATE: 60 BPM | RESPIRATION RATE: 18 BRPM | DIASTOLIC BLOOD PRESSURE: 65 MMHG | BODY MASS INDEX: 37.94 KG/M2 | SYSTOLIC BLOOD PRESSURE: 95 MMHG | WEIGHT: 271 LBS | HEIGHT: 71 IN

## 2024-10-31 DIAGNOSIS — G20.B2 PARKINSON'S DISEASE WITH DYSKINESIA AND FLUCTUATING MANIFESTATIONS (HCC): ICD-10-CM

## 2024-10-31 DIAGNOSIS — I10 ESSENTIAL HYPERTENSION: ICD-10-CM

## 2024-10-31 DIAGNOSIS — N40.1 BENIGN PROSTATIC HYPERPLASIA WITH URINARY FREQUENCY: ICD-10-CM

## 2024-10-31 DIAGNOSIS — I25.5 ISCHEMIC CARDIOMYOPATHY: ICD-10-CM

## 2024-10-31 DIAGNOSIS — E78.2 MIXED HYPERLIPIDEMIA: ICD-10-CM

## 2024-10-31 DIAGNOSIS — C64.2 RENAL CELL CARCINOMA, LEFT (HCC): ICD-10-CM

## 2024-10-31 DIAGNOSIS — I50.22 CHRONIC SYSTOLIC CHF (CONGESTIVE HEART FAILURE) (HCC): ICD-10-CM

## 2024-10-31 DIAGNOSIS — Z23 NEED FOR INFLUENZA VACCINATION: ICD-10-CM

## 2024-10-31 DIAGNOSIS — I25.10 CORONARY ARTERY DISEASE INVOLVING NATIVE CORONARY ARTERY OF NATIVE HEART WITHOUT ANGINA PECTORIS: ICD-10-CM

## 2024-10-31 DIAGNOSIS — E11.42 TYPE 2 DIABETES MELLITUS WITH DIABETIC POLYNEUROPATHY, WITHOUT LONG-TERM CURRENT USE OF INSULIN (HCC): ICD-10-CM

## 2024-10-31 DIAGNOSIS — R35.0 BENIGN PROSTATIC HYPERPLASIA WITH URINARY FREQUENCY: ICD-10-CM

## 2024-10-31 DIAGNOSIS — Z00.00 MEDICARE ANNUAL WELLNESS VISIT, SUBSEQUENT: Primary | ICD-10-CM

## 2024-10-31 DIAGNOSIS — I48.11 LONGSTANDING PERSISTENT ATRIAL FIBRILLATION (HCC): ICD-10-CM

## 2024-10-31 ASSESSMENT — PATIENT HEALTH QUESTIONNAIRE - PHQ9
SUM OF ALL RESPONSES TO PHQ QUESTIONS 1-9: 0
1. LITTLE INTEREST OR PLEASURE IN DOING THINGS: NOT AT ALL
SUM OF ALL RESPONSES TO PHQ QUESTIONS 1-9: 0
SUM OF ALL RESPONSES TO PHQ9 QUESTIONS 1 & 2: 0
SUM OF ALL RESPONSES TO PHQ QUESTIONS 1-9: 0
2. FEELING DOWN, DEPRESSED OR HOPELESS: NOT AT ALL
SUM OF ALL RESPONSES TO PHQ QUESTIONS 1-9: 0

## 2024-10-31 NOTE — PROGRESS NOTES
Medicare Annual Wellness Visit    William Krishna is here for Medicare AWV    Assessment & Plan   Medicare annual wellness visit, subsequent  Need for influenza vaccination  -     Influenza, FLUZONE HIGH DOSE Trivalent, (age 65 y+), IM, Preservative Free, 0.5mL  Coronary artery disease involving native coronary artery of native heart without angina pectoris  Essential hypertension  Mixed hyperlipidemia  Chronic systolic CHF (congestive heart failure) (HCC)  Renal cell carcinoma, left (HCC)  Longstanding persistent atrial fibrillation (HCC)  Ischemic cardiomyopathy  Type 2 diabetes mellitus with diabetic polyneuropathy, without long-term current use of insulin (HCC)  Parkinson's disease with dyskinesia and fluctuating manifestations (HCC)    Recommendations for Preventive Services Due: see orders and patient instructions/AVS.  Recommended screening schedule for the next 5-10 years is provided to the patient in written form: see Patient Instructions/AVS.     Return in about 4 months (around 2/28/2025) for htn dm .     Subjective       HPI   84 y.o. male with hx of CAD> HTN, DM, hyperlipidemia, Afibb, s.p pacer, renal cancer s.p resection  here for medicare wellness visit      Since last time pt had been dx with Parkinson's disease by [facility] neurology and was started on sinemet 10/100 mg tid and wife notes improved tremors, fatigue and now going to PT   , off walker and using just a cane now  No new falls    Hx of recurrent paroxysmal Afibb,  pacer in place,  Remains on Toprol as well , off digoxin , off Amio and off cardizem with no benefit   was off anticoagulation for severe rectal bleed from hemorrhoids needing 11 units transfusion. Recent repeat colonoscopy neg   Had watchman device place and is off AC now       CAD sp multiple stents  -reports hx of MI in 2007 leading to low EF   - his cardiac care was mainly in [place] and recently moved to [facility] a year ago  -  currently followed by Dr. Villagomez at [facility], no

## 2024-11-01 LAB
BASOPHILS # BLD: 0 K/UL (ref 0–0.2)
BASOPHILS NFR BLD: 0 %
DEPRECATED RDW RBC AUTO: 14.6 % (ref 12.4–15.4)
EOSINOPHIL # BLD: 0.2 K/UL (ref 0–0.6)
EOSINOPHIL NFR BLD: 2 %
EST. AVERAGE GLUCOSE BLD GHB EST-MCNC: 131.2 MG/DL
HBA1C MFR BLD: 6.2 %
HCT VFR BLD AUTO: 41.6 % (ref 40.5–52.5)
HGB BLD-MCNC: 14.8 G/DL (ref 13.5–17.5)
LYMPHOCYTES # BLD: 3.1 K/UL (ref 1–5.1)
LYMPHOCYTES NFR BLD: 38 %
MCH RBC QN AUTO: 34.5 PG (ref 26–34)
MCHC RBC AUTO-ENTMCNC: 35.6 G/DL (ref 31–36)
MCV RBC AUTO: 96.7 FL (ref 80–100)
MONOCYTES # BLD: 0.8 K/UL (ref 0–1.3)
MONOCYTES NFR BLD: 10 %
NEUTROPHILS # BLD: 4.1 K/UL (ref 1.7–7.7)
NEUTROPHILS NFR BLD: 50 %
NEUTS VAC BLD QL SMEAR: PRESENT
PLATELET # BLD AUTO: 212 K/UL (ref 135–450)
PMV BLD AUTO: 10 FL (ref 5–10.5)
PSA SERPL DL<=0.01 NG/ML-MCNC: 1.66 NG/ML (ref 0–4)
RBC # BLD AUTO: 4.31 M/UL (ref 4.2–5.9)
RBC MORPH BLD: NORMAL
TSH SERPL DL<=0.005 MIU/L-ACNC: 2.65 UIU/ML (ref 0.27–4.2)
WBC # BLD AUTO: 8.2 K/UL (ref 4–11)

## 2024-11-06 NOTE — PROGRESS NOTES
[facility]   Electrophysiology Outpatient Note              Date:  November 8, 2024  Patient name: William Krishna  YOB: 1940    Primary Care physician: Petr Grijalva MD    HISTORY OF PRESENT ILLNESS: The patient is a 84 y.o.  male with a history of persistent atrial fibrillation, bradycardia, BiV pacemaker, CAD/MI with PCI x 3, ischemic cardiomyopathy with recovered EF, hypertension, renal cancer, GI bleed, hypothyroidism, diabetes, hyperlipidemia    Patient was admitted to [facility] 12/4/2023 with persistent atrial fibrillation and initiation of Tikosyn.     Today he is being seen for persistent atrial fibrillation and follow-up of Tikosyn initiation. EKG shows atrial fibrillation with a HR of 87.Device check revealed patient in atrial fibrillation at least since December 21. He was discharged from hospital on December 8. RV 48%,  98%  Patient complains of feeling tired which has been going on for the last 4 years.  Denies chest pain shortness of breath and palpitations.  Patient is with his wife. They used the [employer] mobile at home and realized they were in Afib.  Will discuss with Dr Pérez next week.  Patient agreeable for atrial fibrillation ablation if that is next best option    2/2024 Patient was seen in EP clinic by Dr. Pérez.  His Tikosyn was discontinued he was started on amiodarone 3/6/2024 Patient underwent successful DCCV.  5/7/2024 patient was seen in EP clinic and was having tremors and ataxia.  His amiodarone was discontinued.  Patient also complains of fatigue and was sent to Dr. Samuel for further evaluation.  7/24/2024 patient underwent Lexiscan which revealed small area of myocardial ischemia.  His echo revealed LVEF 40-45%.  Patient was referred to GI due to anemia and history of GI bleeding.  EGD revealed esophagitis and colonoscopy revealed multiple diverticuli in the sigmoid colon and patient had had several polyps

## 2024-11-08 ENCOUNTER — NURSE ONLY (OUTPATIENT)
Dept: CARDIOLOGY CLINIC | Age: 84
End: 2024-11-08

## 2024-11-08 ENCOUNTER — OFFICE VISIT (OUTPATIENT)
Dept: CARDIOLOGY CLINIC | Age: 84
End: 2024-11-08

## 2024-11-08 VITALS
BODY MASS INDEX: 37.77 KG/M2 | HEIGHT: 71 IN | SYSTOLIC BLOOD PRESSURE: 112 MMHG | OXYGEN SATURATION: 97 % | HEART RATE: 71 BPM | WEIGHT: 269.8 LBS | DIASTOLIC BLOOD PRESSURE: 68 MMHG

## 2024-11-08 DIAGNOSIS — Z95.0 CARDIAC RESYNCHRONIZATION THERAPY PACEMAKER (CRT-P) IN PLACE: Primary | ICD-10-CM

## 2024-11-08 DIAGNOSIS — I49.5 SSS (SICK SINUS SYNDROME) (HCC): ICD-10-CM

## 2024-11-08 DIAGNOSIS — I48.91 ATRIAL FIBRILLATION, UNSPECIFIED TYPE (HCC): Primary | ICD-10-CM

## 2024-11-08 NOTE — PATIENT INSTRUCTIONS
Continue Toprol XL 25 mg daily  Continue aspirin 81 mg daily and Zetia 10 mg daily  Continue Cozaar 25 mg daily   Continue spironolactone 12.5 mg daily  Continue Diovan 20 mg daily      Follow up in 6 months

## 2024-11-25 RX ORDER — TAMSULOSIN HYDROCHLORIDE 0.4 MG/1
0.4 CAPSULE ORAL DAILY
Qty: 90 CAPSULE | Refills: 1 | Status: SHIPPED | OUTPATIENT
Start: 2024-11-25

## 2024-12-02 DIAGNOSIS — E11.42 TYPE 2 DIABETES MELLITUS WITH DIABETIC POLYNEUROPATHY, WITHOUT LONG-TERM CURRENT USE OF INSULIN (HCC): ICD-10-CM

## 2024-12-02 RX ORDER — LEVOTHYROXINE SODIUM 50 MCG
50 TABLET ORAL DAILY
Qty: 90 TABLET | Refills: 1 | Status: SHIPPED | OUTPATIENT
Start: 2024-12-02

## 2024-12-02 RX ORDER — DULAGLUTIDE 0.75 MG/.5ML
INJECTION, SOLUTION SUBCUTANEOUS
Qty: 2 ML | Refills: 2 | Status: SHIPPED | OUTPATIENT
Start: 2024-12-02

## 2024-12-19 RX ORDER — VALSARTAN 40 MG/1
20 TABLET ORAL DAILY
Qty: 30 TABLET | Refills: 1 | Status: SHIPPED | OUTPATIENT
Start: 2024-12-19

## 2024-12-19 NOTE — TELEPHONE ENCOUNTER
Last Office Visit: 10/16/24 Provider: CHASITY  **Is provider OOT? No    Next Office Visit: 2/10/25 Provider: SMM    Lab orders needed? no   Encounter provider correct? Yes If not, change provider  Script changes since last refill? no    LAST LABS:   BMP:   Lab Results   Component Value Date/Time     10/23/2024 01:40 PM    K 3.8 10/23/2024 01:40 PM    K 3.9 12/08/2023 05:24 AM    CL 96 10/23/2024 01:40 PM    CO2 28 10/23/2024 01:40 PM    BUN 14 10/23/2024 01:40 PM    CREATININE 1.0 10/23/2024 01:40 PM    GLUCOSE 145 10/23/2024 01:40 PM    CALCIUM 9.3 10/23/2024 01:40 PM    LABGLOM 75 10/23/2024 01:40 PM    LABGLOM >60 03/04/2024 11:20 AM

## 2024-12-19 NOTE — TELEPHONE ENCOUNTER
Medication Refill    Medication needing refilled:  valsartan (DIOVAN) 40 MG tablet     Dosage of the medication:  Take 0.5 tablets by mouth daily    How are you taking this medication (QD, BID, TID, QID, PRN):    30 or 90 day supply called in:  90 days    When will you run out of your medication:    Which Pharmacy are we sending the medication to?:  [facility] - [place] - [address] - P [phone] - F [phone]

## 2025-02-03 DIAGNOSIS — I10 ESSENTIAL HYPERTENSION: ICD-10-CM

## 2025-02-03 DIAGNOSIS — I25.5 ISCHEMIC CARDIOMYOPATHY: ICD-10-CM

## 2025-02-03 DIAGNOSIS — I25.119 CORONARY ARTERY DISEASE INVOLVING NATIVE CORONARY ARTERY OF NATIVE HEART WITH ANGINA PECTORIS (HCC): ICD-10-CM

## 2025-02-03 RX ORDER — SPIRONOLACTONE 25 MG/1
12.5 TABLET ORAL DAILY
Qty: 90 TABLET | Refills: 1 | Status: SHIPPED | OUTPATIENT
Start: 2025-02-03

## 2025-02-03 NOTE — TELEPHONE ENCOUNTER
Pt is requesting refill of Spirolactone 25mg 1/2 tab daily. Preferred pharmacy is     [facility] - [place] - [address] [address] - P [phone]     Last ov 10/16/2024 [facility]  Next ov 02/10/2025 ольга

## 2025-02-10 ENCOUNTER — OFFICE VISIT (OUTPATIENT)
Dept: CARDIOLOGY CLINIC | Age: 85
End: 2025-02-10
Payer: MEDICARE

## 2025-02-10 VITALS
HEART RATE: 76 BPM | WEIGHT: 270 LBS | DIASTOLIC BLOOD PRESSURE: 48 MMHG | SYSTOLIC BLOOD PRESSURE: 90 MMHG | BODY MASS INDEX: 38.65 KG/M2 | OXYGEN SATURATION: 96 % | HEIGHT: 70 IN

## 2025-02-10 DIAGNOSIS — Z87.891 HISTORY OF TOBACCO ABUSE: ICD-10-CM

## 2025-02-10 DIAGNOSIS — I25.10 CORONARY ARTERY DISEASE INVOLVING NATIVE CORONARY ARTERY OF NATIVE HEART WITHOUT ANGINA PECTORIS: ICD-10-CM

## 2025-02-10 DIAGNOSIS — R03.1 LOW BLOOD PRESSURE READING: Primary | ICD-10-CM

## 2025-02-10 DIAGNOSIS — I48.0 PAF (PAROXYSMAL ATRIAL FIBRILLATION) (HCC): ICD-10-CM

## 2025-02-10 PROCEDURE — 3074F SYST BP LT 130 MM HG: CPT | Performed by: INTERNAL MEDICINE

## 2025-02-10 PROCEDURE — 1123F ACP DISCUSS/DSCN MKR DOCD: CPT | Performed by: INTERNAL MEDICINE

## 2025-02-10 PROCEDURE — 93294 REM INTERROG EVL PM/LDLS PM: CPT | Performed by: INTERNAL MEDICINE

## 2025-02-10 PROCEDURE — 1036F TOBACCO NON-USER: CPT | Performed by: INTERNAL MEDICINE

## 2025-02-10 PROCEDURE — 3078F DIAST BP <80 MM HG: CPT | Performed by: INTERNAL MEDICINE

## 2025-02-10 PROCEDURE — 93296 REM INTERROG EVL PM/IDS: CPT | Performed by: INTERNAL MEDICINE

## 2025-02-10 PROCEDURE — G8417 CALC BMI ABV UP PARAM F/U: HCPCS | Performed by: INTERNAL MEDICINE

## 2025-02-10 PROCEDURE — 99214 OFFICE O/P EST MOD 30 MIN: CPT | Performed by: INTERNAL MEDICINE

## 2025-02-10 PROCEDURE — 1159F MED LIST DOCD IN RCRD: CPT | Performed by: INTERNAL MEDICINE

## 2025-02-10 PROCEDURE — G8427 DOCREV CUR MEDS BY ELIG CLIN: HCPCS | Performed by: INTERNAL MEDICINE

## 2025-02-10 RX ORDER — LANOLIN ALCOHOL/MO/W.PET/CERES
1000 CREAM (GRAM) TOPICAL DAILY
COMMUNITY

## 2025-02-10 RX ORDER — ACETAMINOPHEN 160 MG
2000 TABLET,DISINTEGRATING ORAL DAILY
COMMUNITY

## 2025-02-10 NOTE — PROGRESS NOTES
current alcohol use. He reports that he does not use drugs.     Family History:  family history is not significant for heart disease in parents     Home Medications:  Prior to Admission medications    Medication Sig Start Date End Date Taking? Authorizing Provider   Psyllium (METAMUCIL PO) Take by mouth   Yes Nghia Quintero MD   vitamin B-12 (CYANOCOBALAMIN) 1000 MCG tablet Take 1 tablet by mouth daily   Yes Nghia Quintero MD   vitamin D (VITAMIN D3) 50 MCG (2000 UT) CAPS capsule Take 1 capsule by mouth daily   Yes Nghia Quintero MD   Calcium Carbonate-Vit D-Min (CALCIUM 1200 PO) Take by mouth   Yes Nghia Quintero MD   Probiotic Product (PROBIOTIC DAILY PO) Take by mouth   Yes Nghia Quintero MD   spironolactone (ALDACTONE) 25 MG tablet Take 0.5 tablets by mouth daily 2/3/25  Yes Fausto Samuel DO   valsartan (DIOVAN) 40 MG tablet Take 0.5 tablets by mouth daily 12/19/24  Yes Alberto Villagomez MD   SYNTHROID 50 MCG tablet TAKE 1 TABLET DAILY 12/2/24  Yes Petr Grijalva MD   TRULICITY 0.75 MG/0.5ML SOAJ SC injection INJECT 0.75 MG UNDER THE SKIN ONCE WEEKLY 12/2/24  Yes Petr Grijalva MD   tamsulosin (FLOMAX) 0.4 MG capsule TAKE 1 CAPSULE DAILY 11/25/24  Yes Petr Grijalva MD   CARBIDOPA-LEVODOPA PO Take by mouth TID   Yes Nghia Quintero MD   ezetimibe (ZETIA) 10 MG tablet Take 1 tablet by mouth daily 7/18/24  Yes Alberto Villagomez MD   metoprolol succinate (TOPROL XL) 25 MG extended release tablet Take 1 tablet by mouth daily 7/18/24  Yes Alberto Villagomez MD   REPATHA SURECLICK 140 MG/ML SOAJ ADMINISTER 1 ML UNDER THE SKIN EVERY 14 DAYS 7/11/24  Yes Alberto Villagomez MD   MAGNESIUM PO Take by mouth   Yes Nghia Quintero MD   aspirin 81 MG EC tablet Take 1 tablet by mouth daily   Yes Nghia Quintero MD   ACCU-CHEK ELBERT PLUS strip  1/24/22  Yes Nghia Quintero MD   Insulin Pen Needle (BD PEN NEEDLE JM 2ND GEN) 32G X 4 MM MISC Use

## 2025-02-10 NOTE — PATIENT INSTRUCTIONS
Plan:  Medications reviewed in office. Medications refilled as warranted  Labs reviewed in epic and discussed with patient.  Recommend monitoring blood pressure at home 4-5 times a week. Keep a log of blood pressure and heart rate. Goal 130/80 or less. Call office for further intervention if consistently elevated above goal. Please monitor blood pressure if systolic is less than 100 mmhg please call office.  Continue as planned with Electrophysiology follow up with Kimberly Nicolas CNP   Follow up with me in 6-7 months

## 2025-03-10 DIAGNOSIS — E11.42 TYPE 2 DIABETES MELLITUS WITH DIABETIC POLYNEUROPATHY, WITHOUT LONG-TERM CURRENT USE OF INSULIN (HCC): ICD-10-CM

## 2025-03-10 RX ORDER — DULAGLUTIDE 0.75 MG/.5ML
INJECTION, SOLUTION SUBCUTANEOUS
Qty: 2 ML | Refills: 2 | Status: SHIPPED | OUTPATIENT
Start: 2025-03-10

## 2025-03-19 ENCOUNTER — TELEPHONE (OUTPATIENT)
Dept: INTERNAL MEDICINE CLINIC | Age: 85
End: 2025-03-19

## 2025-03-19 RX ORDER — AZITHROMYCIN 250 MG/1
TABLET, FILM COATED ORAL
Qty: 1 PACKET | Refills: 0 | Status: SHIPPED | OUTPATIENT
Start: 2025-03-19

## 2025-03-19 NOTE — TELEPHONE ENCOUNTER
----- Message from Dr. Gunner Pichardo MD sent at 3/19/2025  1:41 PM EDT -----  Contact: Milagro [phone]  santomirta  ----- Message -----  From: Keara Garrett  Sent: 3/19/2025  10:57 AM EDT  To: Gunner Pichardo MD    Partner requesting medication for patient who has had cough, chest congestion, tiredness for week and 1/2 now.  Mucus is now turning yellow.   Patient has taken no over-the-counter medications.  Patient is COVID negative.  Please advise          [facility] [id number] - [address] - P [phone] - F [phone]

## 2025-04-03 RX ORDER — VALSARTAN 40 MG/1
20 TABLET ORAL DAILY
Qty: 30 TABLET | Refills: 4 | Status: SHIPPED | OUTPATIENT
Start: 2025-04-03

## 2025-04-03 NOTE — TELEPHONE ENCOUNTER
Last Office Visit: 2/10/25 Provider: MICHELE  **Is provider OOT? No    Next Office Visit: 9/4/25 Provider: KARLAM  **If no OV, when does pt need to be seen? N/a  **Has patient already had 30 day supply? No    Lab orders needed? no   Encounter provider correct? Yes If not, change provider  Script changes since last refill? no    LAST LABS:   BMP:   Lab Results   Component Value Date/Time     10/23/2024 01:40 PM    K 3.8 10/23/2024 01:40 PM    K 3.9 12/08/2023 05:24 AM    CL 96 10/23/2024 01:40 PM    CO2 28 10/23/2024 01:40 PM    BUN 14 10/23/2024 01:40 PM    CREATININE 1.0 10/23/2024 01:40 PM    GLUCOSE 145 10/23/2024 01:40 PM    CALCIUM 9.3 10/23/2024 01:40 PM    LABGLOM 75 10/23/2024 01:40 PM    LABGLOM >60 03/04/2024 11:20 AM

## 2025-04-16 SDOH — ECONOMIC STABILITY: FOOD INSECURITY: WITHIN THE PAST 12 MONTHS, THE FOOD YOU BOUGHT JUST DIDN'T LAST AND YOU DIDN'T HAVE MONEY TO GET MORE.: NEVER TRUE

## 2025-04-16 SDOH — ECONOMIC STABILITY: FOOD INSECURITY: WITHIN THE PAST 12 MONTHS, YOU WORRIED THAT YOUR FOOD WOULD RUN OUT BEFORE YOU GOT MONEY TO BUY MORE.: NEVER TRUE

## 2025-04-16 SDOH — ECONOMIC STABILITY: TRANSPORTATION INSECURITY
IN THE PAST 12 MONTHS, HAS THE LACK OF TRANSPORTATION KEPT YOU FROM MEDICAL APPOINTMENTS OR FROM GETTING MEDICATIONS?: NO

## 2025-04-16 SDOH — ECONOMIC STABILITY: INCOME INSECURITY: IN THE LAST 12 MONTHS, WAS THERE A TIME WHEN YOU WERE NOT ABLE TO PAY THE MORTGAGE OR RENT ON TIME?: NO

## 2025-04-16 ASSESSMENT — PATIENT HEALTH QUESTIONNAIRE - PHQ9
SUM OF ALL RESPONSES TO PHQ QUESTIONS 1-9: 0
SUM OF ALL RESPONSES TO PHQ9 QUESTIONS 1 & 2: 0
2. FEELING DOWN, DEPRESSED OR HOPELESS: NOT AT ALL
1. LITTLE INTEREST OR PLEASURE IN DOING THINGS: NOT AT ALL
1. LITTLE INTEREST OR PLEASURE IN DOING THINGS: NOT AT ALL
2. FEELING DOWN, DEPRESSED OR HOPELESS: NOT AT ALL

## 2025-04-17 ENCOUNTER — OFFICE VISIT (OUTPATIENT)
Dept: INTERNAL MEDICINE CLINIC | Age: 85
End: 2025-04-17

## 2025-04-17 VITALS — BODY MASS INDEX: 38.51 KG/M2 | WEIGHT: 269 LBS | HEIGHT: 70 IN

## 2025-04-17 DIAGNOSIS — I25.5 ISCHEMIC CARDIOMYOPATHY: ICD-10-CM

## 2025-04-17 DIAGNOSIS — E11.42 TYPE 2 DIABETES MELLITUS WITH DIABETIC POLYNEUROPATHY, WITHOUT LONG-TERM CURRENT USE OF INSULIN (HCC): Primary | ICD-10-CM

## 2025-04-17 DIAGNOSIS — I48.11 LONGSTANDING PERSISTENT ATRIAL FIBRILLATION (HCC): ICD-10-CM

## 2025-04-17 DIAGNOSIS — E78.2 MIXED HYPERLIPIDEMIA: ICD-10-CM

## 2025-04-17 DIAGNOSIS — G20.B2 PARKINSON'S DISEASE WITH DYSKINESIA AND FLUCTUATING MANIFESTATIONS (HCC): ICD-10-CM

## 2025-04-17 DIAGNOSIS — E66.01 MORBID (SEVERE) OBESITY DUE TO EXCESS CALORIES: ICD-10-CM

## 2025-04-17 DIAGNOSIS — I10 ESSENTIAL HYPERTENSION: ICD-10-CM

## 2025-04-17 DIAGNOSIS — E78.9 DISORDER OF LIPOID METABOLISM: ICD-10-CM

## 2025-04-17 DIAGNOSIS — C64.2 RENAL CELL CARCINOMA, LEFT: ICD-10-CM

## 2025-04-17 DIAGNOSIS — I50.22 CHRONIC SYSTOLIC CHF (CONGESTIVE HEART FAILURE) (HCC): ICD-10-CM

## 2025-04-17 DIAGNOSIS — E11.42 TYPE 2 DIABETES MELLITUS WITH DIABETIC POLYNEUROPATHY, WITHOUT LONG-TERM CURRENT USE OF INSULIN (HCC): ICD-10-CM

## 2025-04-17 PROBLEM — K57.90 DIVERTICULAR DISEASE: Status: ACTIVE | Noted: 2025-04-17

## 2025-04-17 PROBLEM — G20.A1 PARKINSON'S DISEASE (TREMOR, STIFFNESS, SLOW MOTION, UNSTABLE POSTURE) (HCC): Status: ACTIVE | Noted: 2024-10-24

## 2025-04-17 PROCEDURE — G8417 CALC BMI ABV UP PARAM F/U: HCPCS | Performed by: INTERNAL MEDICINE

## 2025-04-17 PROCEDURE — G8427 DOCREV CUR MEDS BY ELIG CLIN: HCPCS | Performed by: INTERNAL MEDICINE

## 2025-04-17 PROCEDURE — 99213 OFFICE O/P EST LOW 20 MIN: CPT | Performed by: INTERNAL MEDICINE

## 2025-04-17 PROCEDURE — 1123F ACP DISCUSS/DSCN MKR DOCD: CPT | Performed by: INTERNAL MEDICINE

## 2025-04-17 PROCEDURE — 1160F RVW MEDS BY RX/DR IN RCRD: CPT | Performed by: INTERNAL MEDICINE

## 2025-04-17 PROCEDURE — 1036F TOBACCO NON-USER: CPT | Performed by: INTERNAL MEDICINE

## 2025-04-17 PROCEDURE — 1159F MED LIST DOCD IN RCRD: CPT | Performed by: INTERNAL MEDICINE

## 2025-04-17 RX ORDER — TAMSULOSIN HYDROCHLORIDE 0.4 MG/1
0.4 CAPSULE ORAL DAILY
Qty: 90 CAPSULE | Refills: 1 | Status: SHIPPED | OUTPATIENT
Start: 2025-04-17

## 2025-04-17 RX ORDER — LEVOTHYROXINE SODIUM 50 UG/1
50 TABLET ORAL DAILY
Qty: 90 TABLET | Refills: 1 | Status: SHIPPED | OUTPATIENT
Start: 2025-04-17

## 2025-04-17 NOTE — PROGRESS NOTES
William Krishna (:  1940) is a 84 y.o. male,New patient, here for evaluation of the following chief complaint(s):  Follow-up        HPI   84 y.o. male with hx of CAD> HTN, DM, hyperlipidemia, Afibb, s.p pacer, renal cancer s.p resection , parkinsons disease here for regular fw        Parkinson's disease - f.w [facility] neurology and was started on sinemet 10/100 mg tid and now increased to 4 times daily and tolerating well   wife notes improved tremors, fatigue   , off walker and using just a cane now  No new falls    Hx of Recurrent paroxysmal Afibb,  pacer in place,  Remains on Toprol as well , off digoxin , off Amio and off cardizem with no benefit   was off anticoagulation for severe rectal bleed from hemorrhoids needing 11 units transfusion.   Recent repeat colonoscopy neg   Had watchman device place and is off AC now       CAD sp multiple stents  -reports hx of MI in  leading to low EF   -  currently followed by Dr. Villagomez at [facility], no active symptoms. Compliant with meds - ASA, toprol..    - unable to take statins with rhabdo and now on Praluent and zetia , improved lipids recently   Recent stress () was abn but medical mx advised and pt denies any chest pains       Hx of IHD with CM , low EF of 40 %, improved to 55 % off  Entresto for low BP , continued BB , recently started back on valsartan at lower dose ,   no diuretics with no features of CHF  See above for changes     Hx of DM 2- now on trulicity 0.75 mg with good control of sugars , sugars below 150 -home sugar log noted, range from 110- 145 , no low sugars    Last A1c less than 6      Hx of renal cancer s.p partial left nephrectomy- recent ct with no recurrence      Seen urology for fatigue and noted to have low testosterone levels and started testosterone gel supplements by Dr. LOFTON. Reports some improvement in fatigue but not a whole lot difference and has to stop them now       Has progressive loss of vision , f/w CEI, does not

## 2025-04-18 ENCOUNTER — RESULTS FOLLOW-UP (OUTPATIENT)
Dept: INTERNAL MEDICINE CLINIC | Age: 85
End: 2025-04-18

## 2025-04-18 LAB
ALBUMIN SERPL-MCNC: 4.5 G/DL (ref 3.4–5)
ALBUMIN/GLOB SERPL: 1.7 {RATIO} (ref 1.1–2.2)
ALP SERPL-CCNC: 48 U/L (ref 40–129)
ALT SERPL-CCNC: 25 U/L (ref 10–40)
ANION GAP SERPL CALCULATED.3IONS-SCNC: 10 MMOL/L (ref 3–16)
AST SERPL-CCNC: 44 U/L (ref 15–37)
BILIRUB SERPL-MCNC: 0.8 MG/DL (ref 0–1)
BUN SERPL-MCNC: 13 MG/DL (ref 7–20)
CALCIUM SERPL-MCNC: 9.7 MG/DL (ref 8.3–10.6)
CHLORIDE SERPL-SCNC: 99 MMOL/L (ref 99–110)
CO2 SERPL-SCNC: 30 MMOL/L (ref 21–32)
CREAT SERPL-MCNC: 0.7 MG/DL (ref 0.8–1.3)
EST. AVERAGE GLUCOSE BLD GHB EST-MCNC: 125.5 MG/DL
GFR SERPLBLD CREATININE-BSD FMLA CKD-EPI: >90 ML/MIN/{1.73_M2}
GLUCOSE SERPL-MCNC: 111 MG/DL (ref 70–99)
HBA1C MFR BLD: 6 %
POTASSIUM SERPL-SCNC: 4.6 MMOL/L (ref 3.5–5.1)
PROT SERPL-MCNC: 7.1 G/DL (ref 6.4–8.2)
SODIUM SERPL-SCNC: 139 MMOL/L (ref 136–145)
TSH SERPL DL<=0.005 MIU/L-ACNC: 2.35 UIU/ML (ref 0.27–4.2)

## 2025-05-07 NOTE — PROGRESS NOTES
[facility]   Electrophysiology Outpatient Note              Date:  May 7, 2025  Patient name: William Krishna  YOB: 1940    Primary Care physician: Petr Grijalva MD    HISTORY OF PRESENT ILLNESS: The patient is a 84 y.o.  male with a history of persistent atrial fibrillation, bradycardia, BiV pacemaker, CAD/MI with PCI x 3, ischemic cardiomyopathy with recovered EF, hypertension, renal cancer, GI bleed, hypothyroidism, diabetes, hyperlipidemia    Patient was admitted to [facility] 12/4/2023 with persistent atrial fibrillation and initiation of Tikosyn.     Today he is being seen for persistent atrial fibrillation and follow-up of Tikosyn initiation. EKG shows atrial fibrillation with a HR of 87.Device check revealed patient in atrial fibrillation at least since December 21. He was discharged from hospital on December 8. RV 48%,  98%  Patient complains of feeling tired which has been going on for the last 4 years.  Denies chest pain shortness of breath and palpitations.  Patient is with his wife. They used the Sphere 3d mobile at home and realized they were in Afib.  Will discuss with Dr Pérez next week.  Patient agreeable for atrial fibrillation ablation if that is next best option    2/2024 Patient was seen in EP clinic by Dr. Pérez.  His Tikosyn was discontinued he was started on amiodarone 3/6/2024 Patient underwent successful DCCV.  5/7/2024 patient was seen in EP clinic and was having tremors and ataxia.  His amiodarone was discontinued.  Patient also complains of fatigue and was sent to Dr. Samuel for further evaluation.  7/24/2024 patient underwent Lexiscan which revealed small area of myocardial ischemia.  His echo revealed LVEF 40-45%.  Patient was referred to GI due to anemia and history of GI bleeding.  EGD revealed esophagitis and colonoscopy revealed multiple diverticuli in the sigmoid colon and patient had had several polyps

## 2025-05-08 ENCOUNTER — CLINICAL SUPPORT (OUTPATIENT)
Dept: CARDIOLOGY CLINIC | Age: 85
End: 2025-05-08

## 2025-05-08 ENCOUNTER — OFFICE VISIT (OUTPATIENT)
Dept: CARDIOLOGY CLINIC | Age: 85
End: 2025-05-08
Payer: MEDICARE

## 2025-05-08 VITALS
OXYGEN SATURATION: 96 % | HEIGHT: 70 IN | BODY MASS INDEX: 39.08 KG/M2 | DIASTOLIC BLOOD PRESSURE: 68 MMHG | WEIGHT: 273 LBS | HEART RATE: 70 BPM | SYSTOLIC BLOOD PRESSURE: 108 MMHG

## 2025-05-08 DIAGNOSIS — I10 PRIMARY HYPERTENSION: ICD-10-CM

## 2025-05-08 DIAGNOSIS — I48.19 PERSISTENT ATRIAL FIBRILLATION (HCC): Primary | ICD-10-CM

## 2025-05-08 DIAGNOSIS — Z95.0 CARDIAC RESYNCHRONIZATION THERAPY PACEMAKER (CRT-P) IN PLACE: Primary | ICD-10-CM

## 2025-05-08 DIAGNOSIS — I25.84 CORONARY ARTERY DISEASE DUE TO CALCIFIED CORONARY LESION: ICD-10-CM

## 2025-05-08 DIAGNOSIS — I25.10 CORONARY ARTERY DISEASE DUE TO CALCIFIED CORONARY LESION: ICD-10-CM

## 2025-05-08 DIAGNOSIS — Z45.018 BIVENTRICULAR PACEMAKER CHECK: ICD-10-CM

## 2025-05-08 DIAGNOSIS — I48.0 PAF (PAROXYSMAL ATRIAL FIBRILLATION) (HCC): ICD-10-CM

## 2025-05-08 DIAGNOSIS — I25.5 ISCHEMIC CARDIOMYOPATHY: ICD-10-CM

## 2025-05-08 PROCEDURE — G8427 DOCREV CUR MEDS BY ELIG CLIN: HCPCS

## 2025-05-08 PROCEDURE — 1036F TOBACCO NON-USER: CPT

## 2025-05-08 PROCEDURE — 3074F SYST BP LT 130 MM HG: CPT

## 2025-05-08 PROCEDURE — 99214 OFFICE O/P EST MOD 30 MIN: CPT

## 2025-05-08 PROCEDURE — 3078F DIAST BP <80 MM HG: CPT

## 2025-05-08 PROCEDURE — 1160F RVW MEDS BY RX/DR IN RCRD: CPT

## 2025-05-08 PROCEDURE — G2211 COMPLEX E/M VISIT ADD ON: HCPCS

## 2025-05-08 PROCEDURE — 93000 ELECTROCARDIOGRAM COMPLETE: CPT

## 2025-05-08 PROCEDURE — 1159F MED LIST DOCD IN RCRD: CPT

## 2025-05-08 PROCEDURE — 1123F ACP DISCUSS/DSCN MKR DOCD: CPT

## 2025-05-08 PROCEDURE — G8417 CALC BMI ABV UP PARAM F/U: HCPCS

## 2025-05-08 RX ORDER — EPLERENONE 25 MG/1
12.5 TABLET ORAL DAILY
Qty: 15 TABLET | Refills: 3 | Status: SHIPPED | OUTPATIENT
Start: 2025-05-08

## 2025-05-08 NOTE — PATIENT INSTRUCTIONS
Continue Toprol XL 25 mg daily  Continue aspirin 81 mg daily and Zetia 10 mg daily  Continue Cozaar 25 mg daily   Stop spironolactone 12.5 mg daily  Start Inspra 12.5 mg daily---start in 2 weeks  Continue Diovan 20 mg daily  Continue with every 3-month remote device checks    Follow up in 6 months Kimberly AREVALO

## 2025-05-14 ENCOUNTER — TELEPHONE (OUTPATIENT)
Dept: CARDIOLOGY CLINIC | Age: 85
End: 2025-05-14

## 2025-05-14 NOTE — TELEPHONE ENCOUNTER
Interaction between eplerenone (INSPRA) 25 MG tablet and Spironolactone 25mg. Please contact pharmacy to discuss    Please contact Bella at [phone] Ref: [id number]

## 2025-06-01 DIAGNOSIS — E11.42 TYPE 2 DIABETES MELLITUS WITH DIABETIC POLYNEUROPATHY, WITHOUT LONG-TERM CURRENT USE OF INSULIN (HCC): ICD-10-CM

## 2025-06-02 DIAGNOSIS — Z79.899 MEDICATION MANAGEMENT: Primary | ICD-10-CM

## 2025-06-02 DIAGNOSIS — E78.2 MIXED HYPERLIPIDEMIA: ICD-10-CM

## 2025-06-02 RX ORDER — DULAGLUTIDE 0.75 MG/.5ML
INJECTION, SOLUTION SUBCUTANEOUS
Qty: 2 ML | Refills: 2 | Status: SHIPPED | OUTPATIENT
Start: 2025-06-02

## 2025-06-02 RX ORDER — EZETIMIBE 10 MG/1
10 TABLET ORAL DAILY
Qty: 90 TABLET | Refills: 0 | Status: SHIPPED | OUTPATIENT
Start: 2025-06-02

## 2025-06-02 NOTE — TELEPHONE ENCOUNTER
Lab Results   Component Value Date    CHOL 141 01/31/2024    TRIG 258 (H) 01/31/2024    HDL 32 (L) 01/31/2024    LDL 57 01/31/2024    VLDL 52 01/31/2024     LOV 2/10/25  NOV 9/4/2025    Needs FLP    Mixed hyperlipidemia: I have personally reviewed all labs including lipids 1/31/24 in Epic (see above).  LDL at goal 57. Note hx rhabdo with statins and takes zetia 10mg daily and Repatha. PCP took off fenofibrate. Note  now and OB=189 in 8/21. Would watch diet as best possible. Likely no clinical benefit adding back fenofibrate. Recheck labs now.    Patient and wife informed and VU

## 2025-06-05 ENCOUNTER — HOSPITAL ENCOUNTER (OUTPATIENT)
Dept: LAB | Age: 85
Discharge: HOME | End: 2025-06-05
Payer: MEDICARE

## 2025-06-05 DIAGNOSIS — Z79.899 MEDICATION MANAGEMENT: ICD-10-CM

## 2025-06-05 DIAGNOSIS — E78.2 MIXED HYPERLIPIDEMIA: ICD-10-CM

## 2025-06-05 LAB
CHOLEST SERPL-MCNC: 147 MG/DL (ref 0–199)
HDLC SERPL-MCNC: 31 MG/DL (ref 40–60)
LDL CHOLESTEROL: ABNORMAL MG/DL
LDLC SERPL-MCNC: 60 MG/DL
TRIGL SERPL-MCNC: 380 MG/DL (ref 0–150)
VLDLC SERPL CALC-MCNC: ABNORMAL MG/DL

## 2025-06-05 PROCEDURE — 80061 LIPID PANEL: CPT

## 2025-06-05 PROCEDURE — 36415 COLL VENOUS BLD VENIPUNCTURE: CPT

## 2025-06-06 ENCOUNTER — RESULTS FOLLOW-UP (OUTPATIENT)
Dept: CARDIOLOGY | Age: 85
End: 2025-06-06

## 2025-06-17 ENCOUNTER — HOSPITAL ENCOUNTER (OUTPATIENT)
Age: 85
Setting detail: SPECIMEN
Discharge: HOME | End: 2025-06-17
Payer: MEDICARE

## 2025-06-17 LAB
CREAT UR-MCNC: 80.8 MG/DL (ref 39–259)
MICROALBUMIN UR DL<=1MG/L-MCNC: <1.2 MG/DL
MICROALBUMIN/CREAT UR: NORMAL MG/G (ref 0–30)

## 2025-06-17 PROCEDURE — 82570 ASSAY OF URINE CREATININE: CPT

## 2025-06-17 PROCEDURE — 82043 UR ALBUMIN QUANTITATIVE: CPT

## 2025-06-18 ENCOUNTER — RESULTS FOLLOW-UP (OUTPATIENT)
Dept: INTERNAL MEDICINE CLINIC | Age: 85
End: 2025-06-18

## 2025-07-02 ENCOUNTER — TELEPHONE (OUTPATIENT)
Dept: CARDIOLOGY CLINIC | Age: 85
End: 2025-07-02

## 2025-07-02 RX ORDER — VALSARTAN 40 MG/1
20 TABLET ORAL DAILY
Qty: 45 TABLET | Refills: 0 | Status: SHIPPED | OUTPATIENT
Start: 2025-07-02

## 2025-07-02 RX ORDER — EPLERENONE 25 MG/1
12.5 TABLET ORAL DAILY
Qty: 15 TABLET | Refills: 5 | Status: SHIPPED | OUTPATIENT
Start: 2025-07-02

## 2025-07-02 NOTE — TELEPHONE ENCOUNTER
Medication Refill    Medication needing refilled:    Valsartan    Dosage of the medication:  40mg    How are you taking this medication (QD, BID, TID, QID, PRN):  QD    30 or 90 day supply called in:  90    When will you run out of your medication:  1 week    Which Pharmacy are we sending the medication to?:    [facility] - ANISHA NOEL - [address] [facility] - P [phone] - F [phone] [23]     Last o/v:  02/10/25 SMM  Next o/v:  09/04/25 SMM    Pts Parkisons provider also asked if it would be okay for the pt to start taking the Valsartan and Metoprolol at night.  The doctor feels it could make a difference in their parkinsons

## 2025-07-02 NOTE — TELEPHONE ENCOUNTER
Last Office Visit: 5/8/2025 Provider: DENZEL  **Is provider OOT? No    Next Office Visit: 11/20/25 Provider: DENZEL  **If no OV, when does pt need to be seen? NA   **Has patient already had 30 day supply? No    Lab orders needed? No   Encounter provider correct? Yes If not, change provider  Script changes since last refill? no    LAST LABS:   BMP:  Lab Results   Component Value Date/Time     04/17/2025 02:24 PM    K 4.6 04/17/2025 02:24 PM    K 3.9 12/08/2023 05:24 AM    CL 99 04/17/2025 02:24 PM    CO2 30 04/17/2025 02:24 PM    BUN 13 04/17/2025 02:24 PM    CREATININE 0.7 04/17/2025 02:24 PM    GLUCOSE 111 04/17/2025 02:24 PM    CALCIUM 9.7 04/17/2025 02:24 PM    LABGLOM >90 04/17/2025 02:24 PM    LABGLOM >60 03/04/2024 11:20 AM        **Care Everywhere? no

## 2025-07-07 ENCOUNTER — HOSPITAL ENCOUNTER (OUTPATIENT)
Dept: LAB | Age: 85
Discharge: HOME | End: 2025-07-07
Payer: MEDICARE

## 2025-07-07 ENCOUNTER — TELEPHONE (OUTPATIENT)
Dept: INTERNAL MEDICINE CLINIC | Age: 85
End: 2025-07-07

## 2025-07-07 DIAGNOSIS — R39.9 UTI SYMPTOMS: Primary | ICD-10-CM

## 2025-07-07 LAB
BACTERIA URNS QL MICRO: ABNORMAL /HPF
BILIRUB UR QL STRIP.AUTO: NEGATIVE
CLARITY UR: CLEAR
COLOR UR: YELLOW
EPI CELLS #/AREA URNS HPF: ABNORMAL /HPF (ref 0–5)
GLUCOSE UR STRIP.AUTO-MCNC: NEGATIVE MG/DL
HGB UR QL STRIP.AUTO: ABNORMAL
KETONES UR STRIP.AUTO-MCNC: ABNORMAL MG/DL
LEUKOCYTE ESTERASE UR QL STRIP.AUTO: ABNORMAL
MUCOUS THREADS #/AREA URNS LPF: ABNORMAL /LPF
NITRITE UR QL STRIP.AUTO: NEGATIVE
PH UR STRIP.AUTO: 8 [PH] (ref 5–8)
PROT UR STRIP.AUTO-MCNC: ABNORMAL MG/DL
RBC #/AREA URNS HPF: ABNORMAL /HPF (ref 0–4)
SP GR UR STRIP.AUTO: 1.01 (ref 1–1.03)
UA DIPSTICK W REFLEX MICRO PNL UR: YES
URN SPEC COLLECT METH UR: ABNORMAL
UROBILINOGEN UR STRIP-ACNC: 4 E.U./DL
WBC #/AREA URNS HPF: ABNORMAL /HPF (ref 0–5)

## 2025-07-07 PROCEDURE — 81001 URINALYSIS AUTO W/SCOPE: CPT

## 2025-07-07 PROCEDURE — 87086 URINE CULTURE/COLONY COUNT: CPT

## 2025-07-07 NOTE — TELEPHONE ENCOUNTER
----- Message from Keara MCKEON sent at 7/7/2025  9:13 AM EDT -----  Contact: Hanna  [phone]  Spouse requesting order for urine sample for patient, so they can go downstairs to lab.  Patient is having with constant urgency to urinate (every 5 minutes), burning, bladder not emptying.  Please advise        [facility] [id number] - [place], [place] - [address] - P [phone] - F [phone]

## 2025-07-08 ENCOUNTER — RESULTS FOLLOW-UP (OUTPATIENT)
Dept: INTERNAL MEDICINE CLINIC | Age: 85
End: 2025-07-08

## 2025-07-08 ENCOUNTER — HOSPITAL ENCOUNTER (INPATIENT)
Age: 85
LOS: 2 days | Discharge: HOME HEALTH | DRG: 872 | End: 2025-07-10
Attending: EMERGENCY MEDICINE | Admitting: INTERNAL MEDICINE
Payer: MEDICARE

## 2025-07-08 ENCOUNTER — APPOINTMENT (OUTPATIENT)
Dept: CT IMAGING | Age: 85
DRG: 872 | End: 2025-07-08
Payer: MEDICARE

## 2025-07-08 DIAGNOSIS — A41.9 SEPSIS, DUE TO UNSPECIFIED ORGANISM, UNSPECIFIED WHETHER ACUTE ORGAN DYSFUNCTION PRESENT (HCC): ICD-10-CM

## 2025-07-08 DIAGNOSIS — N30.00 ACUTE CYSTITIS WITHOUT HEMATURIA: Primary | ICD-10-CM

## 2025-07-08 PROBLEM — N39.0 UTI (URINARY TRACT INFECTION): Status: ACTIVE | Noted: 2025-07-08

## 2025-07-08 LAB
ALBUMIN SERPL-MCNC: 4.5 G/DL (ref 3.4–5)
ALBUMIN/GLOB SERPL: 1.6 {RATIO} (ref 1.1–2.2)
ALP SERPL-CCNC: 53 U/L (ref 40–129)
ALT SERPL-CCNC: 19 U/L (ref 10–40)
ANION GAP SERPL CALCULATED.3IONS-SCNC: 13 MMOL/L (ref 3–16)
AST SERPL-CCNC: 39 U/L (ref 15–37)
BACTERIA URNS QL MICRO: ABNORMAL /HPF
BASOPHILS # BLD: 0 K/UL (ref 0–0.2)
BASOPHILS NFR BLD: 0 %
BILIRUB SERPL-MCNC: 1.6 MG/DL (ref 0–1)
BILIRUB UR QL STRIP.AUTO: ABNORMAL
BUN SERPL-MCNC: 17 MG/DL (ref 7–20)
CALCIUM SERPL-MCNC: 10.1 MG/DL (ref 8.3–10.6)
CHLORIDE SERPL-SCNC: 97 MMOL/L (ref 99–110)
CLARITY UR: ABNORMAL
CO2 SERPL-SCNC: 28 MMOL/L (ref 21–32)
COLOR UR: ABNORMAL
CREAT SERPL-MCNC: 1.3 MG/DL (ref 0.8–1.3)
DEPRECATED RDW RBC AUTO: 13.8 % (ref 12.4–15.4)
EOSINOPHIL # BLD: 0 K/UL (ref 0–0.6)
EOSINOPHIL NFR BLD: 0 %
EPI CELLS #/AREA URNS HPF: ABNORMAL /HPF (ref 0–5)
EST. AVERAGE GLUCOSE BLD GHB EST-MCNC: 128.4 MG/DL
GFR SERPLBLD CREATININE-BSD FMLA CKD-EPI: 55 ML/MIN/{1.73_M2}
GLUCOSE BLD-MCNC: 161 MG/DL (ref 70–99)
GLUCOSE BLD-MCNC: 206 MG/DL (ref 70–99)
GLUCOSE SERPL-MCNC: 188 MG/DL (ref 70–99)
GLUCOSE UR STRIP.AUTO-MCNC: 100 MG/DL
HBA1C MFR BLD: 6.1 %
HCT VFR BLD AUTO: 43.2 % (ref 40.5–52.5)
HGB BLD-MCNC: 15.1 G/DL (ref 13.5–17.5)
HGB UR QL STRIP.AUTO: ABNORMAL
KETONES UR STRIP.AUTO-MCNC: 15 MG/DL
LACTATE BLDV-SCNC: 1 MMOL/L (ref 0.4–2)
LACTATE BLDV-SCNC: 2.5 MMOL/L (ref 0.4–2)
LEUKOCYTE ESTERASE UR QL STRIP.AUTO: ABNORMAL
LYMPHOCYTES # BLD: 2.9 K/UL (ref 1–5.1)
LYMPHOCYTES NFR BLD: 15 %
MCH RBC QN AUTO: 35 PG (ref 26–34)
MCHC RBC AUTO-ENTMCNC: 35 G/DL (ref 31–36)
MCV RBC AUTO: 100 FL (ref 80–100)
MONOCYTES # BLD: 1 K/UL (ref 0–1.3)
MONOCYTES NFR BLD: 5 %
NEUTROPHILS # BLD: 15.4 K/UL (ref 1.7–7.7)
NEUTROPHILS NFR BLD: 72 %
NEUTS BAND NFR BLD MANUAL: 8 % (ref 0–7)
NITRITE UR QL STRIP.AUTO: POSITIVE
PERFORMED ON: ABNORMAL
PERFORMED ON: ABNORMAL
PH UR STRIP.AUTO: 5.5 [PH] (ref 5–8)
PLATELET # BLD AUTO: 166 K/UL (ref 135–450)
PLATELET BLD QL SMEAR: ADEQUATE
PMV BLD AUTO: 8.1 FL (ref 5–10.5)
POTASSIUM SERPL-SCNC: 3.9 MMOL/L (ref 3.5–5.1)
PROT SERPL-MCNC: 7.4 G/DL (ref 6.4–8.2)
PROT UR STRIP.AUTO-MCNC: 100 MG/DL
RBC # BLD AUTO: 4.31 M/UL (ref 4.2–5.9)
RBC #/AREA URNS HPF: ABNORMAL /HPF (ref 0–4)
RBC MORPH BLD: NORMAL
SLIDE REVIEW: ABNORMAL
SODIUM SERPL-SCNC: 138 MMOL/L (ref 136–145)
SP GR UR STRIP.AUTO: 1.02 (ref 1–1.03)
UA COMPLETE W REFLEX CULTURE PNL UR: YES
UA DIPSTICK W REFLEX MICRO PNL UR: YES
URN SPEC COLLECT METH UR: ABNORMAL
UROBILINOGEN UR STRIP-ACNC: 4 E.U./DL
WBC # BLD AUTO: 19.3 K/UL (ref 4–11)
WBC #/AREA URNS HPF: ABNORMAL /HPF (ref 0–5)

## 2025-07-08 PROCEDURE — 6360000002 HC RX W HCPCS: Performed by: INTERNAL MEDICINE

## 2025-07-08 PROCEDURE — 85025 COMPLETE CBC W/AUTO DIFF WBC: CPT

## 2025-07-08 PROCEDURE — 83605 ASSAY OF LACTIC ACID: CPT

## 2025-07-08 PROCEDURE — 6360000004 HC RX CONTRAST MEDICATION: Performed by: NURSE PRACTITIONER

## 2025-07-08 PROCEDURE — 2580000003 HC RX 258: Performed by: INTERNAL MEDICINE

## 2025-07-08 PROCEDURE — 96365 THER/PROPH/DIAG IV INF INIT: CPT

## 2025-07-08 PROCEDURE — 6370000000 HC RX 637 (ALT 250 FOR IP): Performed by: INTERNAL MEDICINE

## 2025-07-08 PROCEDURE — 87077 CULTURE AEROBIC IDENTIFY: CPT

## 2025-07-08 PROCEDURE — 87186 SC STD MICRODIL/AGAR DIL: CPT

## 2025-07-08 PROCEDURE — 74177 CT ABD & PELVIS W/CONTRAST: CPT

## 2025-07-08 PROCEDURE — 2580000003 HC RX 258: Performed by: NURSE PRACTITIONER

## 2025-07-08 PROCEDURE — 51798 US URINE CAPACITY MEASURE: CPT

## 2025-07-08 PROCEDURE — 6360000002 HC RX W HCPCS: Performed by: NURSE PRACTITIONER

## 2025-07-08 PROCEDURE — 87086 URINE CULTURE/COLONY COUNT: CPT

## 2025-07-08 PROCEDURE — 36415 COLL VENOUS BLD VENIPUNCTURE: CPT

## 2025-07-08 PROCEDURE — 83036 HEMOGLOBIN GLYCOSYLATED A1C: CPT

## 2025-07-08 PROCEDURE — 80053 COMPREHEN METABOLIC PANEL: CPT

## 2025-07-08 PROCEDURE — 1200000000 HC SEMI PRIVATE

## 2025-07-08 PROCEDURE — 96367 TX/PROPH/DG ADDL SEQ IV INF: CPT

## 2025-07-08 PROCEDURE — 99285 EMERGENCY DEPT VISIT HI MDM: CPT

## 2025-07-08 PROCEDURE — 81001 URINALYSIS AUTO W/SCOPE: CPT

## 2025-07-08 PROCEDURE — 2500000003 HC RX 250 WO HCPCS: Performed by: INTERNAL MEDICINE

## 2025-07-08 RX ORDER — ONDANSETRON 4 MG/1
4 TABLET, ORALLY DISINTEGRATING ORAL EVERY 8 HOURS PRN
Status: DISCONTINUED | OUTPATIENT
Start: 2025-07-08 | End: 2025-07-10 | Stop reason: HOSPADM

## 2025-07-08 RX ORDER — POLYETHYLENE GLYCOL 3350 17 G/17G
17 POWDER, FOR SOLUTION ORAL DAILY PRN
Status: DISCONTINUED | OUTPATIENT
Start: 2025-07-08 | End: 2025-07-10 | Stop reason: HOSPADM

## 2025-07-08 RX ORDER — ACETAMINOPHEN 650 MG/1
650 SUPPOSITORY RECTAL EVERY 6 HOURS PRN
Status: DISCONTINUED | OUTPATIENT
Start: 2025-07-08 | End: 2025-07-10 | Stop reason: HOSPADM

## 2025-07-08 RX ORDER — EZETIMIBE 10 MG/1
10 TABLET ORAL DAILY
Status: DISCONTINUED | OUTPATIENT
Start: 2025-07-08 | End: 2025-07-10 | Stop reason: HOSPADM

## 2025-07-08 RX ORDER — ONDANSETRON 2 MG/ML
4 INJECTION INTRAMUSCULAR; INTRAVENOUS EVERY 6 HOURS PRN
Status: DISCONTINUED | OUTPATIENT
Start: 2025-07-08 | End: 2025-07-10 | Stop reason: HOSPADM

## 2025-07-08 RX ORDER — SODIUM CHLORIDE 0.9 % (FLUSH) 0.9 %
5-40 SYRINGE (ML) INJECTION PRN
Status: DISCONTINUED | OUTPATIENT
Start: 2025-07-08 | End: 2025-07-10 | Stop reason: HOSPADM

## 2025-07-08 RX ORDER — LEVOTHYROXINE SODIUM 50 UG/1
50 TABLET ORAL DAILY
Status: DISCONTINUED | OUTPATIENT
Start: 2025-07-09 | End: 2025-07-10 | Stop reason: HOSPADM

## 2025-07-08 RX ORDER — 0.9 % SODIUM CHLORIDE 0.9 %
30 INTRAVENOUS SOLUTION INTRAVENOUS ONCE
Status: COMPLETED | OUTPATIENT
Start: 2025-07-08 | End: 2025-07-08

## 2025-07-08 RX ORDER — SODIUM CHLORIDE 9 MG/ML
INJECTION, SOLUTION INTRAVENOUS PRN
Status: DISCONTINUED | OUTPATIENT
Start: 2025-07-08 | End: 2025-07-10 | Stop reason: HOSPADM

## 2025-07-08 RX ORDER — ASPIRIN 81 MG/1
81 TABLET ORAL DAILY
Status: DISCONTINUED | OUTPATIENT
Start: 2025-07-09 | End: 2025-07-10 | Stop reason: HOSPADM

## 2025-07-08 RX ORDER — MULTIVITAMIN WITH IRON
1000 TABLET ORAL DAILY
Status: DISCONTINUED | OUTPATIENT
Start: 2025-07-09 | End: 2025-07-10 | Stop reason: HOSPADM

## 2025-07-08 RX ORDER — QUETIAPINE FUMARATE 50 MG/1
50 TABLET, EXTENDED RELEASE ORAL NIGHTLY
Status: DISCONTINUED | OUTPATIENT
Start: 2025-07-08 | End: 2025-07-10 | Stop reason: HOSPADM

## 2025-07-08 RX ORDER — CARBIDOPA AND LEVODOPA 25; 100 MG/1; MG/1
3 TABLET ORAL 4 TIMES DAILY
Status: DISCONTINUED | OUTPATIENT
Start: 2025-07-08 | End: 2025-07-10 | Stop reason: HOSPADM

## 2025-07-08 RX ORDER — METOPROLOL SUCCINATE 25 MG/1
25 TABLET, EXTENDED RELEASE ORAL DAILY
Status: DISCONTINUED | OUTPATIENT
Start: 2025-07-08 | End: 2025-07-10 | Stop reason: HOSPADM

## 2025-07-08 RX ORDER — TAMSULOSIN HYDROCHLORIDE 0.4 MG/1
0.4 CAPSULE ORAL DAILY
Status: DISCONTINUED | OUTPATIENT
Start: 2025-07-08 | End: 2025-07-10 | Stop reason: HOSPADM

## 2025-07-08 RX ORDER — QUETIAPINE FUMARATE 50 MG/1
TABLET, EXTENDED RELEASE ORAL NIGHTLY
COMMUNITY

## 2025-07-08 RX ORDER — ENOXAPARIN SODIUM 100 MG/ML
30 INJECTION SUBCUTANEOUS 2 TIMES DAILY
Status: DISCONTINUED | OUTPATIENT
Start: 2025-07-08 | End: 2025-07-10 | Stop reason: HOSPADM

## 2025-07-08 RX ORDER — SODIUM CHLORIDE 0.9 % (FLUSH) 0.9 %
5-40 SYRINGE (ML) INJECTION EVERY 12 HOURS SCHEDULED
Status: DISCONTINUED | OUTPATIENT
Start: 2025-07-08 | End: 2025-07-10 | Stop reason: HOSPADM

## 2025-07-08 RX ORDER — INSULIN LISPRO 100 [IU]/ML
0-4 INJECTION, SOLUTION INTRAVENOUS; SUBCUTANEOUS
Status: DISCONTINUED | OUTPATIENT
Start: 2025-07-08 | End: 2025-07-10 | Stop reason: HOSPADM

## 2025-07-08 RX ORDER — IOPAMIDOL 755 MG/ML
75 INJECTION, SOLUTION INTRAVASCULAR
Status: COMPLETED | OUTPATIENT
Start: 2025-07-08 | End: 2025-07-08

## 2025-07-08 RX ORDER — ACETAMINOPHEN 325 MG/1
650 TABLET ORAL EVERY 6 HOURS PRN
Status: DISCONTINUED | OUTPATIENT
Start: 2025-07-08 | End: 2025-07-10 | Stop reason: HOSPADM

## 2025-07-08 RX ORDER — CARBIDOPA AND LEVODOPA 25; 100 MG/1; MG/1
1 TABLET ORAL
Status: DISCONTINUED | OUTPATIENT
Start: 2025-07-08 | End: 2025-07-08

## 2025-07-08 RX ADMIN — VANCOMYCIN HYDROCHLORIDE 1750 MG: 10 INJECTION, POWDER, LYOPHILIZED, FOR SOLUTION INTRAVENOUS at 14:05

## 2025-07-08 RX ADMIN — IOPAMIDOL 75 ML: 755 INJECTION, SOLUTION INTRAVENOUS at 12:57

## 2025-07-08 RX ADMIN — TAMSULOSIN HYDROCHLORIDE 0.4 MG: 0.4 CAPSULE ORAL at 21:25

## 2025-07-08 RX ADMIN — PIPERACILLIN AND TAZOBACTAM 4500 MG: 4; .5 INJECTION, POWDER, LYOPHILIZED, FOR SOLUTION INTRAVENOUS at 13:26

## 2025-07-08 RX ADMIN — INSULIN LISPRO 1 UNITS: 100 INJECTION, SOLUTION INTRAVENOUS; SUBCUTANEOUS at 21:25

## 2025-07-08 RX ADMIN — EZETIMIBE 10 MG: 10 TABLET ORAL at 21:25

## 2025-07-08 RX ADMIN — Medication 10 ML: at 21:26

## 2025-07-08 RX ADMIN — METOPROLOL SUCCINATE 25 MG: 25 TABLET, EXTENDED RELEASE ORAL at 21:25

## 2025-07-08 RX ADMIN — QUETIAPINE FUMARATE 50 MG: 50 TABLET, EXTENDED RELEASE ORAL at 21:25

## 2025-07-08 RX ADMIN — CARBIDOPA AND LEVODOPA 3 TABLET: 25; 100 TABLET ORAL at 21:25

## 2025-07-08 RX ADMIN — CEFTRIAXONE SODIUM 2000 MG: 2 INJECTION, POWDER, FOR SOLUTION INTRAMUSCULAR; INTRAVENOUS at 17:07

## 2025-07-08 RX ADMIN — SODIUM CHLORIDE 2190 ML: 0.9 INJECTION, SOLUTION INTRAVENOUS at 13:24

## 2025-07-08 ASSESSMENT — PAIN SCALES - GENERAL: PAINLEVEL_OUTOF10: 0

## 2025-07-08 ASSESSMENT — PAIN - FUNCTIONAL ASSESSMENT: PAIN_FUNCTIONAL_ASSESSMENT: 0-10

## 2025-07-08 NOTE — ED PROVIDER NOTES
I independently examined and evaluated William Krishna.    In brief, patient is a 84yoM who presents to the ED for evaluation of urinary retention. He came in slightly tachycardic found to have leukocytosis and lactate of 2.5 which improved to 1 with Ivf. Admit to hospitalist for UTI.     All diagnostic, treatment, and disposition decisions were made by myself in conjunction with the advanced practice provider/resident physician.     I personally saw the patient and performed a substantive portion of the visit including aspects of the medical decision making. I approved management plan and take responsibility for the patient management.     I personally saw the patient and independently provided 0 minutes of non-concurrent critical care out of the total shared critical care time provided.    Comment: Please note this report has been produced using speech recognition software and may contain errors related to that system including errors in grammar, punctuation, and spelling, as well as words and phrases that may be inappropriate. If there are any questions or concerns please feel free to contact the dictating provider for clarification.    For all further details of the patient's emergency department visit, please see the advanced practice provider's documentation.        Indira Evans MD  07/09/25 2037

## 2025-07-08 NOTE — H&P
[facility] Medicine History & Physical    V 5.1    Date of Admission: 7/8/2025    Date of Service:  Pt seen/examined on 7/8/2025    [x]Admitted to Inpatient with expected LOS greater than two midnights due to medical therapy.  []Placed in Observation status.    Chief Admission Complaint: Generalized weakness frequency of urination    Presenting Admission History:      84 y.o. male who presented to [facility] with above complaint.  PMHx significant for BPH obesity diabetes hyperlipidemia hypertension CHF CAD stent hypothyroidism.    History is from wife as well as medication  He has not feeling well for last few days and has increased frequency of urination  Wife think he must have had fever  Occasional confusion  No abdominal pain  Poor appetite nausea no vomiting no diarrhea  No focal neurological symptoms    Assessment/Plan:        Gen weakness increased frequency// UTI- urine culture - rocephin , received zosyn at ER, Given BPH/ bladder out let obs in CT- flomax , PVR, cerrato prn    Early sepsis- 2/2 above - judicious IVF 2/2 CHF    Elevated cr - hold ACEI , inspra , avoid nephrotoxic meds , BMP    Chronic combined sys and d chf - EF 40 % , DD 1 - 2024     Cad stent -aspirin beta-blocker Zetia    DM-monitor blood sugar with low corrective scale insulin hemoglobin A1c, he does  take Trulicity weekly    Parkinsons  -continue home medications     HTn-blood pressure is controlled     < HLD continue home meds    Class II Obesity - 35.0 to 39.9 kg/m2 with Body mass index is 37.41 kg/m². Complicating assessment and treatment. Placing patient at risk for multiple co-morbidities as well as early death and contributing to the patient's presentation.        Discussed management and the need for Hospitalization of the patient w/ the Emergency Department Provider: Dr. Evans    CXR: I have reviewed the CXR with the following interpretation:   EKG:  I have reviewed the EKG with the following interpretation:

## 2025-07-08 NOTE — ED PROVIDER NOTES
[facility] EMERGENCY DEPARTMENT  EMERGENCY DEPARTMENT ENCOUNTER        Pt Name: William Krishna  MRN: [id number]  Birthdate 1940  Date of evaluation: 7/8/2025  Provider: ALBARO Luis - IRINA  PCP: Petr Grijalva MD  Note Started: 2:51 PM EDT 7/8/25       I have seen and evaluated this patient with my supervising physician Indira Evans MD.      CHIEF COMPLAINT       Chief Complaint   Patient presents with    Urinary Retention     Patient c/o urinary retention. Went to his PCP yesterday and had some testing. Pt micro showed a UTI. PCP instructed patient to go to the ED instead of him calling in any antibiotics because wife called this morning stating pt was diaphoretic through out the night. She's concerned he may have had a fever. Wife states pt looks better today than he did yesterday.        HISTORY OF PRESENT ILLNESS: 1 or more Elements     History From: the patient  Limitations to history : None    William Krishna is a 84 y.o. male who presents to the ER today with complaints of urinary retention, UTI.  Patient went to primary care doctor yesterday and had some testing done which showed a UTI, family states that through the night patient was diaphoretic and very weak and confused, they were referred to the ED for further treatment.  Patient is here for further evaluation.    Nursing Notes were all reviewed and agreed with or any disagreements were addressed in the HPI.    REVIEW OF SYSTEMS :      Review of Systems    Positives and Pertinent negatives as per HPI.     SURGICAL HISTORY     Past Surgical History:   Procedure Laterality Date    CARDIAC SURGERY      stents  and angioplasty    COLONOSCOPY N/A 10/1/2024    COLONOSCOPY performed by Yani Gomes MD at [facility] ENDOSCOPY    COLONOSCOPY N/A 10/1/2024    COLONOSCOPY POLYPECTOMY SNARE/BIOPSY performed by Yani Gomes MD at [facility] ENDOSCOPY    CYST REMOVAL  02/10/2012    scalp    EYE SURGERY  cataracts both eyes    TOOTH

## 2025-07-08 NOTE — ED NOTES
William Krishna is a 84 y.o. male admitted for  Principal Problem:    UTI (urinary tract infection)  Resolved Problems:    * No resolved hospital problems. *  .   Patient Home via family with   Chief Complaint   Patient presents with    Urinary Retention     Patient c/o urinary retention. Went to his PCP yesterday and had some testing. Pt micro showed a UTI. PCP instructed patient to go to the ED instead of him calling in any antibiotics because wife called this morning stating pt was diaphoretic through out the night. She's concerned he may have had a fever. Wife states pt looks better today than he did yesterday.    .  Patient is alert and Person, Place, Time, and Situation  Patient's baseline mobility: Baseline Mobility: pt ambulates with assistance and a walker. Pt is legally blind.  Code Status: Prior   Cardiac Rhythm:       Is patient on baseline Oxygen: no  Abnormal Assessment Findings: dribbling urine, known UTI      NIH Score:    C-SSRS: Risk of Suicide: No Risk  Bedside swallow:        Active LDA's:   Peripheral IV 07/08/25 Right Antecubital (Active)   Site Assessment Clean, dry & intact 07/08/25 1129   Line Status Blood return noted 07/08/25 1129   Phlebitis Assessment No symptoms 07/08/25 1129   Infiltration Assessment 0 07/08/25 1129     Patient admitted with a cerrato: no      Family/Caregiver Present yes Any Concerns: no   Restraints no  Sitter no         Vitals: MEWS Score: 2    Vitals:    07/08/25 1014 07/08/25 1230 07/08/25 1335 07/08/25 1518   BP: 105/73 109/68 114/69 126/68   Pulse: (!) 101 99 81 70   Resp: 18 18 20 17   Temp: 98.4 °F (36.9 °C)      TempSrc: Oral      SpO2: 96% 92% 94% 95%   Weight: 125.1 kg (275 lb 12.8 oz)          Last documented pain score (0-10 scale) Pain Level: 0  Pain medication administered No.    Pertinent or High Risk Medications/Drips: pt will have antibiotics running when coming up to floor. Pt will have vancocin running.    Pending Blood Product Administration:  abd pain and vomiting. BP orig low  pe tender all over  check ua and ct scan, dispo

## 2025-07-09 LAB
ALBUMIN SERPL-MCNC: 3.7 G/DL (ref 3.4–5)
ALP SERPL-CCNC: 43 U/L (ref 40–129)
ALT SERPL-CCNC: 13 U/L (ref 10–40)
ANION GAP SERPL CALCULATED.3IONS-SCNC: 9 MMOL/L (ref 3–16)
AST SERPL-CCNC: 34 U/L (ref 15–37)
BASOPHILS # BLD: 0 K/UL (ref 0–0.2)
BASOPHILS NFR BLD: 0.4 %
BILIRUB DIRECT SERPL-MCNC: 0.4 MG/DL (ref 0–0.3)
BILIRUB INDIRECT SERPL-MCNC: 0.3 MG/DL (ref 0–1)
BILIRUB SERPL-MCNC: 0.7 MG/DL (ref 0–1)
BUN SERPL-MCNC: 12 MG/DL (ref 7–20)
CALCIUM SERPL-MCNC: 8.9 MG/DL (ref 8.3–10.6)
CHLORIDE SERPL-SCNC: 101 MMOL/L (ref 99–110)
CO2 SERPL-SCNC: 28 MMOL/L (ref 21–32)
CREAT SERPL-MCNC: 0.8 MG/DL (ref 0.8–1.3)
DEPRECATED RDW RBC AUTO: 13.9 % (ref 12.4–15.4)
EOSINOPHIL # BLD: 0.1 K/UL (ref 0–0.6)
EOSINOPHIL NFR BLD: 1.3 %
GFR SERPLBLD CREATININE-BSD FMLA CKD-EPI: 88 ML/MIN/{1.73_M2}
GLUCOSE BLD-MCNC: 131 MG/DL (ref 70–99)
GLUCOSE BLD-MCNC: 145 MG/DL (ref 70–99)
GLUCOSE BLD-MCNC: 178 MG/DL (ref 70–99)
GLUCOSE BLD-MCNC: 182 MG/DL (ref 70–99)
GLUCOSE SERPL-MCNC: 176 MG/DL (ref 70–99)
HCT VFR BLD AUTO: 38 % (ref 40.5–52.5)
HGB BLD-MCNC: 13.2 G/DL (ref 13.5–17.5)
LYMPHOCYTES # BLD: 1.1 K/UL (ref 1–5.1)
LYMPHOCYTES NFR BLD: 11.2 %
MCH RBC QN AUTO: 34.9 PG (ref 26–34)
MCHC RBC AUTO-ENTMCNC: 34.6 G/DL (ref 31–36)
MCV RBC AUTO: 100.8 FL (ref 80–100)
MONOCYTES # BLD: 0.7 K/UL (ref 0–1.3)
MONOCYTES NFR BLD: 7.2 %
NEUTROPHILS # BLD: 8 K/UL (ref 1.7–7.7)
NEUTROPHILS NFR BLD: 79.9 %
PERFORMED ON: ABNORMAL
PLATELET # BLD AUTO: 120 K/UL (ref 135–450)
PMV BLD AUTO: 8.6 FL (ref 5–10.5)
POTASSIUM SERPL-SCNC: 3.6 MMOL/L (ref 3.5–5.1)
PROT SERPL-MCNC: 6.2 G/DL (ref 6.4–8.2)
RBC # BLD AUTO: 3.77 M/UL (ref 4.2–5.9)
SODIUM SERPL-SCNC: 138 MMOL/L (ref 136–145)
WBC # BLD AUTO: 10 K/UL (ref 4–11)

## 2025-07-09 PROCEDURE — 97535 SELF CARE MNGMENT TRAINING: CPT

## 2025-07-09 PROCEDURE — 97162 PT EVAL MOD COMPLEX 30 MIN: CPT

## 2025-07-09 PROCEDURE — 85025 COMPLETE CBC W/AUTO DIFF WBC: CPT

## 2025-07-09 PROCEDURE — 80076 HEPATIC FUNCTION PANEL: CPT

## 2025-07-09 PROCEDURE — 6370000000 HC RX 637 (ALT 250 FOR IP): Performed by: INTERNAL MEDICINE

## 2025-07-09 PROCEDURE — 6360000002 HC RX W HCPCS: Performed by: NURSE PRACTITIONER

## 2025-07-09 PROCEDURE — 2580000003 HC RX 258: Performed by: NURSE PRACTITIONER

## 2025-07-09 PROCEDURE — 97166 OT EVAL MOD COMPLEX 45 MIN: CPT

## 2025-07-09 PROCEDURE — 87040 BLOOD CULTURE FOR BACTERIA: CPT

## 2025-07-09 PROCEDURE — 2500000003 HC RX 250 WO HCPCS: Performed by: INTERNAL MEDICINE

## 2025-07-09 PROCEDURE — 1200000000 HC SEMI PRIVATE

## 2025-07-09 PROCEDURE — 97116 GAIT TRAINING THERAPY: CPT

## 2025-07-09 PROCEDURE — 36415 COLL VENOUS BLD VENIPUNCTURE: CPT

## 2025-07-09 PROCEDURE — 80048 BASIC METABOLIC PNL TOTAL CA: CPT

## 2025-07-09 RX ORDER — EVOLOCUMAB 140 MG/ML
INJECTION, SOLUTION SUBCUTANEOUS
Qty: 6 ML | Refills: 3 | Status: SHIPPED | OUTPATIENT
Start: 2025-07-09

## 2025-07-09 RX ADMIN — QUETIAPINE FUMARATE 50 MG: 50 TABLET, EXTENDED RELEASE ORAL at 21:13

## 2025-07-09 RX ADMIN — CARBIDOPA AND LEVODOPA 1 TABLET: 25; 100 TABLET ORAL at 21:13

## 2025-07-09 RX ADMIN — LEVOTHYROXINE SODIUM 50 MCG: 0.05 TABLET ORAL at 08:46

## 2025-07-09 RX ADMIN — CARBIDOPA AND LEVODOPA 3 TABLET: 25; 100 TABLET ORAL at 16:49

## 2025-07-09 RX ADMIN — CARBIDOPA AND LEVODOPA 3 TABLET: 25; 100 TABLET ORAL at 13:09

## 2025-07-09 RX ADMIN — Medication 10 ML: at 21:11

## 2025-07-09 RX ADMIN — CEFTRIAXONE SODIUM 2000 MG: 2 INJECTION, POWDER, FOR SOLUTION INTRAMUSCULAR; INTRAVENOUS at 16:56

## 2025-07-09 RX ADMIN — INSULIN LISPRO 1 UNITS: 100 INJECTION, SOLUTION INTRAVENOUS; SUBCUTANEOUS at 11:56

## 2025-07-09 RX ADMIN — CARBIDOPA AND LEVODOPA 3 TABLET: 25; 100 TABLET ORAL at 08:46

## 2025-07-09 RX ADMIN — CYANOCOBALAMIN TAB 500 MCG 1000 MCG: 500 TAB at 08:46

## 2025-07-09 RX ADMIN — EZETIMIBE 10 MG: 10 TABLET ORAL at 21:14

## 2025-07-09 RX ADMIN — Medication 10 ML: at 08:48

## 2025-07-09 RX ADMIN — METOPROLOL SUCCINATE 25 MG: 25 TABLET, EXTENDED RELEASE ORAL at 21:13

## 2025-07-09 RX ADMIN — TAMSULOSIN HYDROCHLORIDE 0.4 MG: 0.4 CAPSULE ORAL at 21:14

## 2025-07-09 RX ADMIN — ASPIRIN 81 MG: 81 TABLET, COATED ORAL at 08:46

## 2025-07-09 NOTE — TELEPHONE ENCOUNTER
Last Office Visit: 2/10/23 Provider: MICHELE  **Is provider OOT? No    Next Office Visit: 9/4/2025 Provider: MICHELE  **If no OV, when does pt need to be seen? N/a  **Has patient already had 30 day supply? No    Lab orders needed? no   Encounter provider correct? Yes If not, change provider  Script changes since last refill? no    LAST LABS:   Lipid:  Lab Results   Component Value Date    CHOL 141 01/31/2024    TRIG 258 (H) 01/31/2024    HDL 31 (L) 06/05/2025    LDL see below 06/05/2025    VLDL see below 06/05/2025

## 2025-07-09 NOTE — PLAN OF CARE
Problem: Chronic Conditions and Co-morbidities  Goal: Patient's chronic conditions and co-morbidity symptoms are monitored and maintained or improved  7/8/2025 2344 by Alysa Suazo RN  Outcome: Progressing     Problem: Discharge Planning  Goal: Discharge to home or other facility with appropriate resources  7/8/2025 2344 by Alysa Suazo RN  Outcome: Progressing     Problem: Safety - Adult  Goal: Free from fall injury  Outcome: Progressing  Bed in lowest position, wheels locked, 2/4 side rails up, nonskid footwear on. Bed/ chair check alarm in place, call light within reach. Pt instructed to call out when needing assistance. Pt stated understanding.

## 2025-07-09 NOTE — PLAN OF CARE
Problem: Chronic Conditions and Co-morbidities  Goal: Patient's chronic conditions and co-morbidity symptoms are monitored and maintained or improved  7/9/2025 0951 by Vera Jimenez RN  Outcome: Progressing     Problem: Discharge Planning  Goal: Discharge to home or other facility with appropriate resources  7/9/2025 0951 by Vera Jimenez RN  Outcome: Progressing     Problem: Safety - Adult  Goal: Free from fall injury  7/9/2025 0951 by Vera Jimenez RN  Outcome: Progressing

## 2025-07-09 NOTE — CARE COORDINATION
Case Management Assessment  Initial Evaluation    Date/Time of Evaluation: 7/9/2025 2:50 PM  Assessment Completed by: Kimberly Montgomery RN    If patient is discharged prior to next notation, then this note serves as note for discharge by case management.    Patient Name: William Krishna                   YOB: 1940  Diagnosis: UTI (urinary tract infection) [N39.0]  Acute cystitis without hematuria [N30.00]  Sepsis, due to unspecified organism, unspecified whether acute organ dysfunction present (HCC) [A41.9]                   Date / Time: 7/8/2025  9:46 AM    Patient Admission Status: Inpatient   Readmission Risk (Low < 19, Mod (19-27), High > 27): Readmission Risk Score: 12.8    Current PCP: Petr Grijalva MD  PCP verified by CM? Yes (Dr. grijalva)    Chart Reviewed: Yes      History Provided by: Patient, Spouse, Medical Record  Patient Orientation: Alert and Oriented    Patient Cognition: Alert    Hospitalization in the last 30 days (Readmission):  No    If yes, Readmission Assessment in CM Navigator will be completed.    Advance Directives:      Code Status: Full Code   Patient's Primary Decision Maker is: Named in Scanned ACP Document      Discharge Planning:    Patient lives with: Spouse/Significant Other Type of Home: House  Primary Care Giver: Spouse  Patient Support Systems include: Spouse/Significant Other, Family Members, Children   Current Financial resources: Medicare  Current community resources: None  Current services prior to admission: Other (Comment) (Does OP Parkinsons therapy)            Current DME:              Type of Home Care services:  None    ADLS  Prior functional level: Assistance with the following:, Shopping, Housework, Cooking, Bathing  Current functional level: Assistance with the following:, Housework, Cooking, Bathing, Shopping, Mobility    PT AM-PAC:   /24  OT AM-PAC:   /24    Family can provide assistance at DC: Yes  Would you like Case Management to discuss the

## 2025-07-10 ENCOUNTER — TELEPHONE (OUTPATIENT)
Dept: INTERNAL MEDICINE CLINIC | Age: 85
End: 2025-07-10

## 2025-07-10 VITALS
HEIGHT: 72 IN | OXYGEN SATURATION: 92 % | HEART RATE: 93 BPM | BODY MASS INDEX: 37.36 KG/M2 | RESPIRATION RATE: 16 BRPM | SYSTOLIC BLOOD PRESSURE: 123 MMHG | WEIGHT: 275.8 LBS | TEMPERATURE: 97.7 F | DIASTOLIC BLOOD PRESSURE: 67 MMHG

## 2025-07-10 LAB
ANION GAP SERPL CALCULATED.3IONS-SCNC: 11 MMOL/L (ref 3–16)
BACTERIA UR CULT: ABNORMAL
BASOPHILS # BLD: 0 K/UL (ref 0–0.2)
BASOPHILS NFR BLD: 0 %
BUN SERPL-MCNC: 12 MG/DL (ref 7–20)
CALCIUM SERPL-MCNC: 9 MG/DL (ref 8.3–10.6)
CHLORIDE SERPL-SCNC: 101 MMOL/L (ref 99–110)
CO2 SERPL-SCNC: 28 MMOL/L (ref 21–32)
CREAT SERPL-MCNC: 0.9 MG/DL (ref 0.8–1.3)
DEPRECATED RDW RBC AUTO: 13.7 % (ref 12.4–15.4)
EOSINOPHIL # BLD: 0.2 K/UL (ref 0–0.6)
EOSINOPHIL NFR BLD: 3 %
GFR SERPLBLD CREATININE-BSD FMLA CKD-EPI: 85 ML/MIN/{1.73_M2}
GLUCOSE BLD-MCNC: 153 MG/DL (ref 70–99)
GLUCOSE BLD-MCNC: 155 MG/DL (ref 70–99)
GLUCOSE SERPL-MCNC: 158 MG/DL (ref 70–99)
HCT VFR BLD AUTO: 37.5 % (ref 40.5–52.5)
HGB BLD-MCNC: 13.2 G/DL (ref 13.5–17.5)
LYMPHOCYTES # BLD: 1.4 K/UL (ref 1–5.1)
LYMPHOCYTES NFR BLD: 14 %
MCH RBC QN AUTO: 35.2 PG (ref 26–34)
MCHC RBC AUTO-ENTMCNC: 35.2 G/DL (ref 31–36)
MCV RBC AUTO: 99.8 FL (ref 80–100)
MONOCYTES # BLD: 0.5 K/UL (ref 0–1.3)
MONOCYTES NFR BLD: 8 %
NEUTROPHILS # BLD: 4.2 K/UL (ref 1.7–7.7)
NEUTROPHILS NFR BLD: 56 %
NEUTS BAND NFR BLD MANUAL: 11 % (ref 0–7)
ORGANISM: ABNORMAL
PERFORMED ON: ABNORMAL
PERFORMED ON: ABNORMAL
PLATELET # BLD AUTO: 135 K/UL (ref 135–450)
PMV BLD AUTO: 8.2 FL (ref 5–10.5)
POTASSIUM SERPL-SCNC: 3.6 MMOL/L (ref 3.5–5.1)
RBC # BLD AUTO: 3.76 M/UL (ref 4.2–5.9)
RBC MORPH BLD: NORMAL
SODIUM SERPL-SCNC: 140 MMOL/L (ref 136–145)
VARIANT LYMPHS NFR BLD MANUAL: 8 % (ref 0–6)
WBC # BLD AUTO: 6.3 K/UL (ref 4–11)

## 2025-07-10 PROCEDURE — 97530 THERAPEUTIC ACTIVITIES: CPT

## 2025-07-10 PROCEDURE — 6360000002 HC RX W HCPCS: Performed by: NURSE PRACTITIONER

## 2025-07-10 PROCEDURE — 80048 BASIC METABOLIC PNL TOTAL CA: CPT

## 2025-07-10 PROCEDURE — 36415 COLL VENOUS BLD VENIPUNCTURE: CPT

## 2025-07-10 PROCEDURE — 6370000000 HC RX 637 (ALT 250 FOR IP): Performed by: INTERNAL MEDICINE

## 2025-07-10 PROCEDURE — 2580000003 HC RX 258: Performed by: NURSE PRACTITIONER

## 2025-07-10 PROCEDURE — 2500000003 HC RX 250 WO HCPCS: Performed by: INTERNAL MEDICINE

## 2025-07-10 PROCEDURE — 85025 COMPLETE CBC W/AUTO DIFF WBC: CPT

## 2025-07-10 PROCEDURE — 97535 SELF CARE MNGMENT TRAINING: CPT

## 2025-07-10 RX ORDER — CEFUROXIME AXETIL 500 MG/1
500 TABLET ORAL 2 TIMES DAILY
Qty: 14 TABLET | Refills: 0 | Status: SHIPPED | OUTPATIENT
Start: 2025-07-10 | End: 2025-07-17

## 2025-07-10 RX ADMIN — ASPIRIN 81 MG: 81 TABLET, COATED ORAL at 09:02

## 2025-07-10 RX ADMIN — Medication 10 ML: at 09:02

## 2025-07-10 RX ADMIN — LEVOTHYROXINE SODIUM 50 MCG: 0.05 TABLET ORAL at 09:02

## 2025-07-10 RX ADMIN — CEFTRIAXONE SODIUM 2000 MG: 2 INJECTION, POWDER, FOR SOLUTION INTRAMUSCULAR; INTRAVENOUS at 11:34

## 2025-07-10 RX ADMIN — CYANOCOBALAMIN TAB 500 MCG 1000 MCG: 500 TAB at 09:02

## 2025-07-10 RX ADMIN — CARBIDOPA AND LEVODOPA 3 TABLET: 25; 100 TABLET ORAL at 09:02

## 2025-07-10 NOTE — PLAN OF CARE
Problem: Chronic Conditions and Co-morbidities  Goal: Patient's chronic conditions and co-morbidity symptoms are monitored and maintained or improved  7/10/2025 0946 by Vera Jimenez RN  Outcome: Progressing     Problem: Discharge Planning  Goal: Discharge to home or other facility with appropriate resources  7/10/2025 0946 by Vera Jimenez RN  Outcome: Progressing     Problem: Safety - Adult  Goal: Free from fall injury  7/10/2025 0946 by Vera Jimenez RN  Outcome: Progressing     Problem: Skin/Tissue Integrity  Goal: Skin integrity remains intact  Description: 1.  Monitor for areas of redness and/or skin breakdown  2.  Assess vascular access sites hourly  3.  Every 4-6 hours minimum:  Change oxygen saturation probe site  4.  Every 4-6 hours:  If on nasal continuous positive airway pressure, respiratory therapy assess nares and determine need for appliance change or resting period  7/10/2025 0946 by Vera Jimenez, RN  Outcome: Progressing

## 2025-07-10 NOTE — TELEPHONE ENCOUNTER
----- Message from Dr. Petr Grijalva MD sent at 7/10/2025  1:47 PM EDT -----  Contact: RHONDA [phone]  Yes  ----- Message -----  From: An Lovett  Sent: 7/10/2025  12:31 PM EDT  To: MD Rhonda Pina with [employer] asking if you will follow and sign home care orders for PT and Skilled nursing. Please advise

## 2025-07-10 NOTE — CARE COORDINATION
Case Management Discharge Summary                                 DISCHARGE Disposition: Home with Home Health Care    [facility] SN/PT    CMS Letters:  Initial IMM Yes  7/9/25     Durable Medical Equipment:  No    Dialysis:  No    Hospice Services:  No    Referrals made at DISCHARGE for outpatient continued care:  Not Applicable      Transportation:  Transportation plan for discharge: Private Car with Spouse  Name of Transport Company: Not Applicable  Date and Time of Transport: 7/10/25  Transport form completed: Not Indicated    Confirmed discharge plan with:  Patient: Yes  Family/Name: Spouse     RN/name: Vera Baker CM Notes: Patient to DC home with [facility] SN/PT    The Patient and/or patient representative William and his family were provided with a choice of provider and agrees with the discharge plan Yes  Freedom of choice list was provided with basic dialogue that supports the patient's individualized plan of care/goals and shares the quality data associated with the providers. Yes    Kimberly Montgomery RN  [facility]   Case Management Department  Ph: [phone]  Fax: [phone]

## 2025-07-10 NOTE — DISCHARGE SUMMARY
QUEtiapine (SEROQUEL XR) 50 MG extended release tablet Take by mouth nightly      eplerenone (INSPRA) 25 MG tablet Take 0.5 tablets by mouth daily  Qty: 15 tablet, Refills: 5      valsartan (DIOVAN) 40 MG tablet Take 0.5 tablets by mouth daily  Qty: 45 tablet, Refills: 0      TRULICITY 0.75 MG/0.5ML SOAJ SC injection INJECT 0.75 MG UNDER THE SKIN ONCE WEEKLY  Qty: 2 mL, Refills: 2    Associated Diagnoses: Type 2 diabetes mellitus with diabetic polyneuropathy, without long-term current use of insulin ([facility])      ezetimibe (ZETIA) 10 MG tablet Take 1 tablet by mouth daily  Qty: 90 tablet, Refills: 0      levothyroxine (SYNTHROID) 50 MCG tablet Take 1 tablet by mouth daily  Qty: 90 tablet, Refills: 1      tamsulosin (FLOMAX) 0.4 MG capsule Take 1 capsule by mouth daily  Qty: 90 capsule, Refills: 1      Psyllium (METAMUCIL PO) Take by mouth      vitamin D (VITAMIN D3) 50 MCG (2000 UT) CAPS capsule Take 1 capsule by mouth daily      Calcium Carbonate-Vit D-Min (CALCIUM 1200 PO) Take by mouth      Probiotic Product (PROBIOTIC DAILY PO) Take by mouth      CARBIDOPA-LEVODOPA PO Take by mouth every 4 hours      metoprolol succinate (TOPROL XL) 25 MG extended release tablet Take 1 tablet by mouth daily  Qty: 90 tablet, Refills: 3      MAGNESIUM PO Take by mouth      aspirin 81 MG EC tablet Take 1 tablet by mouth daily      REPATHA SURECLICK SOAJ pen ADMINISTER 1 ML UNDER THE SKIN EVERY 14 DAYS  Qty: 6 mL, Refills: 3      vitamin B-12 (CYANOCOBALAMIN) 1000 MCG tablet Take 1 tablet by mouth daily      ACCU-CHEK ELBERT PLUS strip       Insulin Pen Needle (BD PEN NEEDLE JM 2ND GEN) 32G X 4 MM MISC Use with Victoza daily.  DX:E11.9  Qty: 100 each, Refills: 3           Current Discharge Medication List          Significant Test Results    CT ABDOMEN PELVIS W IV CONTRAST Additional Contrast? None  Result Date: 7/8/2025  CT ABDOMEN AND PELVIS WITH CONTRAST HISTORY: Urinary retention and abdominal pain COMPARISON: 3/23/2024

## 2025-07-10 NOTE — PROGRESS NOTES
Physician Progress Note      PATIENT:               CHARITO NEGRETE  CSN #:                  [id number]  :                       1940  ADMIT DATE:       2025 9:46 AM  DISCH DATE:  RESPONDING  PROVIDER #:        JUMANA IRVIN - ANGELICA          QUERY TEXT:    BPH is documented in the medical record Progress note by Jumana Can APRN at 2025. Please specify the associated urinary conditions:    The clinical indicators include:  84Yr Male, Hx BPH,    -ER today with complaints of urinary retention, UTI-(ED Notes by Indira Evans MD at 2025)    -Gen weakness increased frequency// UTI- urine culture - rocephin , received   zosyn at ER- (History and physcial notes  by Jose M Talbert MD at   07/10/2025)    -Sepsis in setting of suspected UTI in setting of chronic bladder outlet   obstruction,-\"PMHx significant for BPH, Patient c/o urinary retention\" -   (Progress note by Jumana Can APRN at 2025)    -Treatement: FLOMAX    Thank you,  [email or web address].  Options provided:  -- BPH with partial/complete urinary obstruction  -- BPH with urinary retention without obstruction  -- Other - I will add my own diagnosis  -- Disagree - Not applicable / Not valid  -- Disagree - Clinically unable to determine / Unknown  -- Refer to Clinical Documentation Reviewer    PROVIDER RESPONSE TEXT:    This patient has BPH with urinary retention without obstruction.    Query created by: Sofia Carrizales on 7/10/2025 7:18 AM      Electronically signed by:  JUMANA Sanford NP 7/10/2025 10:16 AM          
4 Eyes Skin Assessment     NAME:  William Krishna  YOB: 1940  MEDICAL RECORD NUMBER:  [id number]    The patient is being assessed for  Admission    I agree that at least one RN has performed a thorough Head to Toe Skin Assessment on the patient. ALL assessment sites listed below have been assessed.      Areas assessed by both nurses:    Head, Face, Ears, Shoulders, Back, Chest, Arms, Elbows, Hands, Sacrum. Buttock, Coccyx, Ischium, Legs. Feet and Heels, and Under Medical Devices   Abrasion to left knee       Does the Patient have a Wound? No noted wound(s)       Mj Prevention initiated by RN: No  Wound Care Orders initiated by RN: No    Pressure Injury (Stage 1,2,3,4, Unstageable, DTI, NWPT, and Complex wounds) if present, place Wound referral order by RN under : No    New Ostomies, if present place, Ostomy referral order under : No     Nurse 1 eSignature: Electronically signed by Vera Jimenez RN on 7/8/25 at 5:30 PM EDT    **SHARE this note so that the co-signing nurse can place an eSignature**    Nurse 2 eSignature: Electronically signed by Chris Jesus RN on 7/8/25 at 7:22 PM EDT    
Occupational Therapy  Facility/Department: [facility] C5 - MED SURG/ORTHO  Daily Treatment Note  NAME: William Krishna  : 1940  MRN: [id number]  Date of Service: 7/10/2025    Discharge Recommendations:  24 hour supervision or assist     AM-PAC score  AM-PAC Inpatient Daily Activity Raw Score: 18 (07/10/25 1226)  AM-PAC Inpatient ADL T-Scale Score : 38.66 (07/10/25 1226)  ADL Inpatient CMS 0-100% Score: 46.65 (07/10/25 1226)  ADL Inpatient CMS G-Code Modifier : CK (07/10/25 122)    If pt is unable to be seen after this session, please let this note serve as discharge summary.  Please see case management note for discharge disposition.  Thank you.    Patient Diagnosis(es): The primary encounter diagnosis was Acute cystitis without hematuria. A diagnosis of Sepsis, due to unspecified organism, unspecified whether acute organ dysfunction present (HCC) was also pertinent to this visit.     Assessment   Assessment: Pt received for OT session resting in bedside chair with wife at side to address current functional deficits that inhibit independence and safety with ADLs and functional mobility. Pt agreeable to session, reporting no pain. During session, pt completed sit<>stands w/ verbal cues and CGA, transfers w/ verbal cues and CGA (toilet, chair), functional mobility in room/bathroom/hallway with RW, verbal cues, and CGA. Completed toileting w/ CGA, Paolo with pulling up pants in stance at toilet. Completed grooming tasks at sink for ~3mins w/ CGA. Pt will continue to benefit from skilled OT while in acute setting to increase functional activity tolerance, safety/independence w/ ADLs, and increase strength. Pt making good progress towards personalized OT goals. Continued recs for home w/ 24hr SPV. Continue POC.     Activity Tolerance: Patient tolerated treatment well;Patient limited by endurance  Discharge Recommendations: 24 hour supervision or assist     Plan  Occupational Therapy Plan  Times Per Week: 2-5x/week (in 
Occupational Therapy  Facility/Department: [facility] C5 - MED SURG/ORTHO  Occupational Therapy Initial Assessment/treatment    Name: William Krishna  : 1940  MRN: [id number]  Date of Service: 2025    Discharge Recommendations:  24 hour supervision or assist          Patient Diagnosis(es): The primary encounter diagnosis was Acute cystitis without hematuria. A diagnosis of Sepsis, due to unspecified organism, unspecified whether acute organ dysfunction present (HCC) was also pertinent to this visit.  Past Medical History:  has a past medical history of Arthritis, CAD (coronary artery disease), Diabetes mellitus (HCC), Hearing decreased, Hyperlipidemia, Hypertension, Pacemaker, Presence of Watchman left atrial appendage closure device, Sneezing, and Thyroid disease.  Past Surgical History:  has a past surgical history that includes Cardiac surgery; eye surgery (cataracts both eyes); cyst removal (02/10/2012); Tooth Extraction (2024); Colonoscopy (N/A, 10/1/2024); Upper gastrointestinal endoscopy (N/A, 10/1/2024); Upper gastrointestinal endoscopy (10/1/2024); and Colonoscopy (N/A, 10/1/2024).      If pt is unable to be seen after this session, please let this note serve as discharge summary.  Please see case management note for discharge disposition.  Thank you.      Assessment  Performance deficits / Impairments: Decreased functional mobility ;Decreased ADL status;Decreased strength  Assessment: Patient presents to [facility] due to a UTI. Patient lives with wife in home, 1 POLI. Per Pt he was independent with basic ADLs at baseline. Pt was ambulating with RW with mod I. Pt presents with decreased strength, balance, and endurance affecting patient's ability to complete ADLs and functional mobility safely. Pt completed supine to sit with min A and increased time. Pt min to mod A for STS from bed to RW. Pt ambulated to toilet with CGA to min A for RW placement and balance. Pt edu on safety awareness with functional 
Patient arrived to unit at 1630, wife went home to  a few things. Writer started IV Rocephin but patient stated he wanted to wait to take any oral medications until his wife returned. Patient stated he would like for his wife to be here also when going through admission questions. Head to toe assessment and vitals completed.   
Patient discharged from unit at this time. IV and tele box removed. Discharge instructions reviewed and questions answered. Prescriptions picked up from outpatient pharmacy. Patient wheeled down to main entrance by writer.   
Patient is currently resting in bed, however was up to the bathroom earlier x1 assist.   Pleasant, cooperative. Depends changed. Denies any additional needs. Call light in reach. Bed alarm activated for safety.     
Physical Therapy  Facility/Department: [place] [address] - MED SURG/ORTHO  Physical Therapy Initial Assessment and treatment    Name: William Krishna  : 1940  MRN: [id number]  Date of Service: 2025    Discharge Recommendations:  24 hour supervision or assist, Therapy recommended at discharge, Home with Home health PT   PT Equipment Recommendations  Equipment Needed: No      Patient Diagnosis(es): The primary encounter diagnosis was Acute cystitis without hematuria. A diagnosis of Sepsis, due to unspecified organism, unspecified whether acute organ dysfunction present (HCC) was also pertinent to this visit.  Past Medical History:  has a past medical history of Arthritis, CAD (coronary artery disease), Diabetes mellitus (HCC), Hearing decreased, Hyperlipidemia, Hypertension, Pacemaker, Presence of Watchman left atrial appendage closure device, Sneezing, and Thyroid disease.  Past Surgical History:  has a past surgical history that includes Cardiac surgery; eye surgery (cataracts both eyes); cyst removal (02/10/2012); Tooth Extraction (2024); Colonoscopy (N/A, 10/1/2024); Upper gastrointestinal endoscopy (N/A, 10/1/2024); Upper gastrointestinal endoscopy (10/1/2024); and Colonoscopy (N/A, 10/1/2024).    Assessment  Body Structures, Functions, Activity Limitations Requiring Skilled Therapeutic Intervention: Decreased functional mobility ;Decreased body mechanics;Decreased strength;Decreased safe awareness;Decreased balance;Decreased posture  Assessment: Pt seen for PT eval. Pt presents to [facility] for weakness and decreased urination. Pt previously IND for functional mobility, intermittently uses RW. Pt performs multiple STS to RW with CGA and amb 75 ft +15 ft with RW and CGA, min verbal cues for safe RW management. Pt demos slightly decreased strength and decreased balance. Pt would benefit from skilled PT to address deficits, improve safety, and maximize independence. Discharge recs: home with / assist, HHPT, no 
non-tender, non-distended. Bowel sounds normal  Musculoskeletal:  No edema  Neurologic:  Neurovascularly intact without focal sensory/motor deficits.  Psychiatric:  Alert and oriented    /77   Pulse 71   Temp 98.8 °F (37.1 °C) (Oral)   Resp 16   Ht 1.829 m (6')   Wt 125.1 kg (275 lb 12.8 oz)   SpO2 94%   BMI 37.41 kg/m²     Telemetry:      Personally reviewed and interpreted telemetry (Rhythm Strip) on 7/9/2025.  Patient is currently ON tele demonstrating Paced Rhythm.    Diet: ADULT DIET; Regular; 3 carb choices (45 gm/meal)    DVT Prophylaxis: PPX dose LMWH    Code status: Full Code    PT/OT Eval Status: Seen w/ recs for home w/ assist    Multi-Disciplinary Rounds with Case Management completed on 7/9/2025 with the following recs:     Anticipated Discharge Location: Home w/ HHC     Anticipated Discharge Day/Date: 1 to 2 days    Barriers to Discharge: Clinical Course    Likely rate limiting factor: Pending urine culture results    --------------------------------------------------    MDM (any 2 required for High level billing)    A. Problems (any 1)  [x] Acute/Chronic Illness/injury posing ongoing threat to life and/or bodily function without ongoing treatment    [] Severe exacerbation of chronic illness    --------------------------------------------------  B. Risk of Treatment (any 1)    [] Drugs/treatments that require intensive monitoring for toxicity    [] IV ABX (Vancomycin, Aminoglycosides, etc)     [] Post-Cath/Contrast study requiring serial monitoring    [] IV Narcotic analgesia    [] Aggressive IV diuresis    [] Hypertonic Saline    [] Critical electrolyte abnormalities requiring IV replacement    [] Insulin - Scheduled/SSI or Insulin gtt    [] Anticoagulation (Heparin gtt or Coumadin - other anticoagulants in special circumstances)    [] Cardiac Medications (IV Amiodarone/Diltiazem, Tikosyn, etc)    [] Hemodialysis    [] Other -    [] Change in code status    [] Decision to escalate care

## 2025-07-10 NOTE — CARE COORDINATION
Writer sent referral to [facility] for SN/PT/OT/Aide services in [facility].    Kimberly Montgomery RN

## 2025-07-10 NOTE — PLAN OF CARE
Transitions in bed, and calls out when assistance is needed.     Problem: Safety - Adult  Goal: Free from fall injury  Outcome: Progressing     Problem: Skin/Tissue Integrity  Goal: Skin integrity remains intact  Description: 1.  Monitor for areas of redness and/or skin breakdown  2.  Assess vascular access sites hourly  3.  Every 4-6 hours minimum:  Change oxygen saturation probe site  4.  Every 4-6 hours:  If on nasal continuous positive airway pressure, respiratory therapy assess nares and determine need for appliance change or resting period  Outcome: Progressing

## 2025-07-10 NOTE — DISCHARGE INSTR - COC
Continuity of Care Form    Patient Name: William Krishna   :  1940  MRN:  [id number]    Admit date:  2025  Discharge date:  7/10/25    Code Status Order: Full Code   Advance Directives:     Admitting Physician:  Jose M Talbert MD  PCP: Petr Grijalva MD    Discharging Nurse: Vera Jimenez RN  Discharging Hospital Unit/Room#: 0543/0543-01  Discharging Unit Phone Number: [phone]    Emergency Contact:   Extended Emergency Contact Information  Primary Emergency Contact: Milagro Heaton  Address: [address] [place] [address]  Home Phone: [phone]  Mobile Phone: [phone]  Relation: Domestic Partner  Secondary Emergency Contact: Елена Resendiz  Mobile Phone: [phone]  Relation: Child  Preferred language: English   needed? No    Past Surgical History:  Past Surgical History:   Procedure Laterality Date    CARDIAC SURGERY      stents  and angioplasty    COLONOSCOPY N/A 10/1/2024    COLONOSCOPY performed by Yani Gomes MD at [facility] ENDOSCOPY    COLONOSCOPY N/A 10/1/2024    COLONOSCOPY POLYPECTOMY SNARE/BIOPSY performed by Yani Gomes MD at [facility] ENDOSCOPY    CYST REMOVAL  02/10/2012    scalp    EYE SURGERY  cataracts both eyes    TOOTH EXTRACTION  2024    UPPER GASTROINTESTINAL ENDOSCOPY N/A 10/1/2024    ESOPHAGOGASTRODUODENOSCOPY performed by Yani Gomes MD at [facility] ENDOSCOPY    UPPER GASTROINTESTINAL ENDOSCOPY  10/1/2024    ESOPHAGOGASTRODUODENOSCOPY BIOPSY performed by Yani Gomes MD at [facility] ENDOSCOPY       Immunization History:   Immunization History   Administered Date(s) Administered    COVID-19, MODERNA BLUE border, Primary or Immunocompromised, (age 12y+), IM, 100 mcg/0.5mL 2021, 2021, 2021    COVID-19, MODERNA Bivalent, (age 12y+), IM, 50 mcg/0.5 mL 2022    Hep A, HAVRIX, VAQTA, (age 19y+), IM, 1mL 1998    Hepatitis B 1998    Influenza Virus Vaccine

## 2025-07-13 LAB
BACTERIA BLD CULT ORG #2: NORMAL
BACTERIA BLD CULT: NORMAL

## 2025-07-22 ENCOUNTER — OFFICE VISIT (OUTPATIENT)
Dept: INTERNAL MEDICINE CLINIC | Age: 85
End: 2025-07-22

## 2025-07-22 VITALS — BODY MASS INDEX: 36.98 KG/M2 | WEIGHT: 273 LBS | HEIGHT: 72 IN

## 2025-07-22 DIAGNOSIS — Z09 HOSPITAL DISCHARGE FOLLOW-UP: Primary | ICD-10-CM

## 2025-07-22 DIAGNOSIS — N30.00 ACUTE CYSTITIS WITHOUT HEMATURIA: ICD-10-CM

## 2025-07-22 PROCEDURE — 1160F RVW MEDS BY RX/DR IN RCRD: CPT | Performed by: NURSE PRACTITIONER

## 2025-07-22 PROCEDURE — 1111F DSCHRG MED/CURRENT MED MERGE: CPT | Performed by: NURSE PRACTITIONER

## 2025-07-22 PROCEDURE — 99213 OFFICE O/P EST LOW 20 MIN: CPT | Performed by: NURSE PRACTITIONER

## 2025-07-22 PROCEDURE — G8427 DOCREV CUR MEDS BY ELIG CLIN: HCPCS | Performed by: NURSE PRACTITIONER

## 2025-07-22 PROCEDURE — 1159F MED LIST DOCD IN RCRD: CPT | Performed by: NURSE PRACTITIONER

## 2025-07-22 PROCEDURE — 1036F TOBACCO NON-USER: CPT | Performed by: NURSE PRACTITIONER

## 2025-07-22 PROCEDURE — 1123F ACP DISCUSS/DSCN MKR DOCD: CPT | Performed by: NURSE PRACTITIONER

## 2025-07-22 PROCEDURE — G8417 CALC BMI ABV UP PARAM F/U: HCPCS | Performed by: NURSE PRACTITIONER

## 2025-08-07 PROBLEM — N39.0 UTI (URINARY TRACT INFECTION): Status: RESOLVED | Noted: 2025-07-08 | Resolved: 2025-08-07

## 2025-08-25 DIAGNOSIS — E11.42 TYPE 2 DIABETES MELLITUS WITH DIABETIC POLYNEUROPATHY, WITHOUT LONG-TERM CURRENT USE OF INSULIN (HCC): ICD-10-CM

## 2025-08-25 RX ORDER — DULAGLUTIDE 0.75 MG/.5ML
INJECTION, SOLUTION SUBCUTANEOUS
Qty: 2 ML | Refills: 2 | Status: SHIPPED | OUTPATIENT
Start: 2025-08-25

## 2025-09-04 ENCOUNTER — OFFICE VISIT (OUTPATIENT)
Dept: CARDIOLOGY CLINIC | Age: 85
End: 2025-09-04
Payer: MEDICARE

## 2025-09-04 VITALS
OXYGEN SATURATION: 96 % | BODY MASS INDEX: 37.72 KG/M2 | SYSTOLIC BLOOD PRESSURE: 118 MMHG | HEART RATE: 73 BPM | DIASTOLIC BLOOD PRESSURE: 60 MMHG | HEIGHT: 72 IN | WEIGHT: 278.5 LBS

## 2025-09-04 DIAGNOSIS — I48.0 PAROXYSMAL ATRIAL FIBRILLATION (HCC): Primary | ICD-10-CM

## 2025-09-04 DIAGNOSIS — I25.10 CORONARY ARTERY DISEASE INVOLVING NATIVE CORONARY ARTERY OF NATIVE HEART WITHOUT ANGINA PECTORIS: ICD-10-CM

## 2025-09-04 DIAGNOSIS — Z87.891 HISTORY OF TOBACCO ABUSE: ICD-10-CM

## 2025-09-04 DIAGNOSIS — I71.40 ABDOMINAL AORTIC ANEURYSM (AAA) WITHOUT RUPTURE, UNSPECIFIED PART: ICD-10-CM

## 2025-09-04 DIAGNOSIS — I10 ESSENTIAL HYPERTENSION: ICD-10-CM

## 2025-09-04 DIAGNOSIS — I50.22 CHRONIC SYSTOLIC CHF (CONGESTIVE HEART FAILURE) (HCC): ICD-10-CM

## 2025-09-04 DIAGNOSIS — E78.2 MIXED HYPERLIPIDEMIA: ICD-10-CM

## 2025-09-04 DIAGNOSIS — Z95.0 CARDIAC RESYNCHRONIZATION THERAPY PACEMAKER (CRT-P) IN PLACE: ICD-10-CM

## 2025-09-04 PROCEDURE — G8417 CALC BMI ABV UP PARAM F/U: HCPCS | Performed by: INTERNAL MEDICINE

## 2025-09-04 PROCEDURE — 99214 OFFICE O/P EST MOD 30 MIN: CPT | Performed by: INTERNAL MEDICINE

## 2025-09-04 PROCEDURE — G8427 DOCREV CUR MEDS BY ELIG CLIN: HCPCS | Performed by: INTERNAL MEDICINE

## 2025-09-04 PROCEDURE — 1123F ACP DISCUSS/DSCN MKR DOCD: CPT | Performed by: INTERNAL MEDICINE

## 2025-09-04 PROCEDURE — 1036F TOBACCO NON-USER: CPT | Performed by: INTERNAL MEDICINE

## 2025-09-04 PROCEDURE — 3078F DIAST BP <80 MM HG: CPT | Performed by: INTERNAL MEDICINE

## 2025-09-04 PROCEDURE — 3074F SYST BP LT 130 MM HG: CPT | Performed by: INTERNAL MEDICINE

## 2025-09-04 PROCEDURE — 1159F MED LIST DOCD IN RCRD: CPT | Performed by: INTERNAL MEDICINE

## (undated) DEVICE — FORCEPS BX L240CM JAW DIA2.8MM L CAP W/ NDL MIC MESH TOOTH

## (undated) DEVICE — ELECTRODE ECG MONITR FOAM TEAR DROP ADLT RED

## (undated) DEVICE — CONMED SCOPE SAVER BITE BLOCK, 20X27 MM: Brand: SCOPE SAVER

## (undated) DEVICE — CANNULA NSL AD TBNG L7FT PVC STR NONFLARED PRNG O2 DEL W STD

## (undated) DEVICE — TRAP SPEC POLYPR SGL CHMBR FN MESH SCRN

## (undated) DEVICE — ENDOSCOPIC KIT 2 12 FT OP4 DE2 GWN SYR

## (undated) DEVICE — ELECTRODE,ECG,STRESS,FOAM,3PK: Brand: MEDLINE

## (undated) DEVICE — SNARE ENDOSCP L240CM SHTH DIA24MM LOOP W10MM POLYP RND REINF